# Patient Record
Sex: FEMALE | Race: WHITE | NOT HISPANIC OR LATINO | Employment: OTHER | URBAN - METROPOLITAN AREA
[De-identification: names, ages, dates, MRNs, and addresses within clinical notes are randomized per-mention and may not be internally consistent; named-entity substitution may affect disease eponyms.]

---

## 2018-05-24 LAB
ABSOL LYMPHOCYTES (HISTORICAL): 3.6 K/UL (ref 0.5–4)
ANION GAP SERPL CALCULATED.3IONS-SCNC: 13 MMOL/L (ref 5–14)
BASOPHILS # BLD AUTO: 0.1 K/UL (ref 0–0.1)
BASOPHILS # BLD AUTO: 1 % (ref 0–1)
BUN SERPL-MCNC: 29 MG/DL (ref 5–25)
CALCIUM SERPL-MCNC: 9.3 MG/DL (ref 8.4–10.2)
CHLORIDE SERPL-SCNC: 105 MEQ/L (ref 97–108)
CO2 SERPL-SCNC: 22 MMOL/L (ref 22–30)
CREATINE, SERUM (HISTORICAL): 1.25 MG/DL (ref 0.6–1.2)
DEPRECATED RDW RBC AUTO: 12.4 %
EGFR (HISTORICAL): 41 ML/MIN/1.73 M2
EOSINOPHIL # BLD AUTO: 0 K/UL (ref 0–0.4)
EOSINOPHIL NFR BLD AUTO: 0 % (ref 0–6)
EST. AVERAGE GLUCOSE BLD GHB EST-MCNC: 197 MG/DL
GLUCOSE SERPL-MCNC: 205 MG/DL (ref 70–99)
HBA1C MFR BLD HPLC: 8.5 %
HCT VFR BLD AUTO: 35.9 % (ref 36–46)
HGB BLD-MCNC: 11.8 G/DL (ref 12–16)
LYMPHOCYTES NFR BLD AUTO: 36 % (ref 25–45)
MCH RBC QN AUTO: 31.5 PG (ref 26–34)
MCHC RBC AUTO-ENTMCNC: 32.7 % (ref 31–36)
MCV RBC AUTO: 96 FL (ref 80–100)
MONOCYTES # BLD AUTO: 0.6 K/UL (ref 0.2–0.9)
MONOCYTES NFR BLD AUTO: 6 % (ref 1–10)
NEUTROPHILS ABS COUNT (HISTORICAL): 5.7 K/UL (ref 1.8–7.8)
NEUTS SEG NFR BLD AUTO: 57 % (ref 45–65)
PLATELET # BLD AUTO: 209 K/MCL (ref 150–450)
POTASSIUM SERPL-SCNC: 5 MEQ/L (ref 3.6–5)
RBC # BLD AUTO: 3.73 M/MCL (ref 4–5.2)
SODIUM SERPL-SCNC: 140 MEQ/L (ref 137–147)
TSH SERPL DL<=0.05 MIU/L-ACNC: 1.28 UIU/ML (ref 0.47–4.68)
WBC # BLD AUTO: 10 K/MCL (ref 4.5–11)

## 2018-06-26 DIAGNOSIS — E11.9 TYPE 2 DIABETES MELLITUS WITHOUT COMPLICATION, UNSPECIFIED WHETHER LONG TERM INSULIN USE (HCC): Primary | ICD-10-CM

## 2018-06-26 RX ORDER — GLYBURIDE 3 MG/1
TABLET ORAL
Qty: 360 TABLET | Refills: 3 | Status: SHIPPED | OUTPATIENT
Start: 2018-06-26 | End: 2019-04-29 | Stop reason: SDUPTHER

## 2018-09-07 DIAGNOSIS — E03.9 HYPOTHYROIDISM, UNSPECIFIED TYPE: Primary | ICD-10-CM

## 2018-09-08 RX ORDER — LEVOTHYROXINE SODIUM 0.07 MG/1
TABLET ORAL
Qty: 90 TABLET | Refills: 3 | Status: SHIPPED | OUTPATIENT
Start: 2018-09-08 | End: 2019-09-02 | Stop reason: SDUPTHER

## 2018-09-14 ENCOUNTER — TELEPHONE (OUTPATIENT)
Dept: FAMILY MEDICINE CLINIC | Facility: CLINIC | Age: 83
End: 2018-09-14

## 2018-09-14 NOTE — TELEPHONE ENCOUNTER
Called patient informed her that her appointment on 11/26/18 at 10 am with dr Joaquin Roblero is cancelled he is out of the office and appointment was rescheduled to 11/15/18 at 11 am with dr Joaquin Roblero patient aware of new appointment date and time

## 2018-10-08 RX ORDER — NIFEDIPINE 60 MG/1
TABLET, FILM COATED, EXTENDED RELEASE ORAL
COMMUNITY
Start: 2018-07-12 | End: 2018-10-09 | Stop reason: SDUPTHER

## 2018-10-08 RX ORDER — NIFEDIPINE 60 MG/1
TABLET, FILM COATED, EXTENDED RELEASE ORAL
Refills: 0 | Status: CANCELLED | OUTPATIENT
Start: 2018-10-08

## 2018-10-08 NOTE — TELEPHONE ENCOUNTER
Called pt left detailed message on pt machine to call office to schedule appointment before medication refill can be made thank you

## 2018-10-08 NOTE — TELEPHONE ENCOUNTER
Patient called she needs presp for Nisedipine 60 mg called into [facility] [address] [facility] at [phone] she would like  60 tablets  Pt of dr Deisi Crane and she said that she needs it filled as soon as possible only has 4 pills left     TY

## 2018-10-09 DIAGNOSIS — I10 ESSENTIAL HYPERTENSION: Primary | ICD-10-CM

## 2018-10-09 RX ORDER — NIFEDIPINE 60 MG/1
60 TABLET, FILM COATED, EXTENDED RELEASE ORAL DAILY
Qty: 30 TABLET | Refills: 0 | Status: SHIPPED | OUTPATIENT
Start: 2018-10-09 | End: 2018-10-29 | Stop reason: SDUPTHER

## 2018-10-09 NOTE — TELEPHONE ENCOUNTER
Left message on answering machine to return a call to Dr. Ledesma's office at [facility],biopsy results, ADELINE.   Patient called she said that she needs presp for nisedipine 60 mg called into the pharmacy and she does have an appointment scheduled on 11/15/18 with dr Deshaun Beard

## 2018-10-29 DIAGNOSIS — I10 ESSENTIAL HYPERTENSION: ICD-10-CM

## 2018-10-29 RX ORDER — NIFEDIPINE 60 MG/1
60 TABLET, FILM COATED, EXTENDED RELEASE ORAL DAILY
Qty: 90 TABLET | Refills: 3 | Status: SHIPPED | OUTPATIENT
Start: 2018-10-29 | End: 2019-08-12 | Stop reason: SDUPTHER

## 2018-10-29 NOTE — TELEPHONE ENCOUNTER
Can you please refill patient medication her apt is for nov 15 but she will be out of medication before this date thanks     Fax number- [phone]  Fax number- [phone]

## 2018-11-14 RX ORDER — LISINOPRIL 40 MG/1
TABLET ORAL
COMMUNITY
Start: 2018-04-26 | End: 2019-03-04 | Stop reason: SDUPTHER

## 2018-11-14 RX ORDER — DIAZEPAM 2 MG/1
TABLET ORAL
COMMUNITY
Start: 2016-12-13 | End: 2018-11-15 | Stop reason: SDUPTHER

## 2018-11-14 RX ORDER — BLOOD-GLUCOSE METER
EACH MISCELLANEOUS
COMMUNITY
Start: 2018-01-15

## 2018-11-14 RX ORDER — ASPIRIN 81 MG/1
TABLET ORAL
COMMUNITY
Start: 2017-12-07 | End: 2018-11-15 | Stop reason: ALTCHOICE

## 2018-11-14 RX ORDER — LOVASTATIN 40 MG/1
TABLET ORAL EVERY 24 HOURS
COMMUNITY
Start: 2017-12-07 | End: 2018-12-03 | Stop reason: SDUPTHER

## 2018-11-14 RX ORDER — LANCETS 23 GAUGE
EACH MISCELLANEOUS
COMMUNITY
Start: 2018-01-15 | End: 2019-05-16 | Stop reason: SDUPTHER

## 2018-11-15 ENCOUNTER — OFFICE VISIT (OUTPATIENT)
Dept: FAMILY MEDICINE CLINIC | Facility: CLINIC | Age: 83
End: 2018-11-15
Payer: MEDICARE

## 2018-11-15 VITALS
HEIGHT: 59 IN | OXYGEN SATURATION: 73 % | DIASTOLIC BLOOD PRESSURE: 70 MMHG | BODY MASS INDEX: 28.32 KG/M2 | SYSTOLIC BLOOD PRESSURE: 160 MMHG | HEART RATE: 90 BPM | WEIGHT: 140.5 LBS | TEMPERATURE: 96.1 F

## 2018-11-15 DIAGNOSIS — E11.9 TYPE 2 DIABETES MELLITUS WITHOUT COMPLICATION, WITHOUT LONG-TERM CURRENT USE OF INSULIN (HCC): ICD-10-CM

## 2018-11-15 DIAGNOSIS — E03.9 HYPOTHYROIDISM, UNSPECIFIED TYPE: ICD-10-CM

## 2018-11-15 DIAGNOSIS — F41.9 ANXIETY: Primary | ICD-10-CM

## 2018-11-15 DIAGNOSIS — I10 ESSENTIAL HYPERTENSION: ICD-10-CM

## 2018-11-15 DIAGNOSIS — R80.9 MICROALBUMINURIA: ICD-10-CM

## 2018-11-15 LAB
EST. AVERAGE GLUCOSE BLD GHB EST-MCNC: 226 MG/DL
HBA1C MFR BLD: 9.5 % (ref 4.2–6.3)

## 2018-11-15 PROCEDURE — 36415 COLL VENOUS BLD VENIPUNCTURE: CPT | Performed by: FAMILY MEDICINE

## 2018-11-15 PROCEDURE — 99214 OFFICE O/P EST MOD 30 MIN: CPT | Performed by: FAMILY MEDICINE

## 2018-11-15 PROCEDURE — 83036 HEMOGLOBIN GLYCOSYLATED A1C: CPT | Performed by: FAMILY MEDICINE

## 2018-11-15 RX ORDER — DIAZEPAM 2 MG/1
TABLET ORAL
Qty: 30 TABLET | Refills: 0 | Status: SHIPPED | OUTPATIENT
Start: 2018-11-15 | End: 2019-03-27 | Stop reason: SDUPTHER

## 2018-11-15 NOTE — PROGRESS NOTES
Assessment/Plan:    Microalbuminuria  Last urine stable     Hypertension  Home # Ok        Diagnoses and all orders for this visit:    Anxiety  -     diazepam (VALIUM) 2 mg tablet; Take one tablet every 6 hours as needed    Type 2 diabetes mellitus without complication, without long-term current use of insulin (HCC)  -     Hemoglobin A1C    Hypothyroidism, unspecified type    Essential hypertension    Microalbuminuria    Other orders  -     glucose blood test strip; tests once daily by fingerstick route  -     Blood Glucose Monitoring Suppl (ONETOUCH VERIO FLEX SYSTEM) w/Device KIT; take by Subcutaneous route once  -     Lancets 28G MISC; once daily by fingerstick route  -     lovastatin (MEVACOR) 40 MG tablet; every 24 hours  -     lisinopril (ZESTRIL) 40 mg tablet; Take 1 tablet by mouth daily  -     Discontinue: diazepam (VALIUM) 2 mg tablet; As needed 1 Q 6 H FOR ANXIETY  -     Discontinue: aspirin (ASPIR-81) 81 mg EC tablet; Take 1 tablet by mouth daily          Subjective:      Patient ID: Ck Hoffman is a 80 y o  female  PATIENT RETURNS FOR FOLLOW-UP OF CHRONIC MEDICAL CONDITIONS  NO HOSPITAL STAYS OR EMERGENCY VISITS RECENTLY  MEDS WERE REVIEWED AND NO SIDE EFFECTS  NO NEW ISSUES  UNLESS NOTED BELOW  CHRONIC MEDICAL ISSUES WERE STABLE TODAY  NO MED CHANGES WERE MADE  SURVEILLANCE LABS IF INDICATED WERE DONE  The following portions of the patient's history were reviewed and updated as appropriate: allergies, current medications, past family history, past medical history, past social history, past surgical history and problem list     Review of Systems   Constitutional: Negative for activity change and appetite change  HENT: Negative for voice change  Eyes: Negative for visual disturbance  Respiratory: Negative for chest tightness and shortness of breath  Cardiovascular: Negative for chest pain, palpitations and leg swelling     Gastrointestinal: Negative for abdominal pain, blood in stool, constipation and diarrhea  Genitourinary: Negative for dysuria, vaginal bleeding and vaginal discharge  Skin: Negative for rash  Neurological: Negative for dizziness  Psychiatric/Behavioral: Negative for dysphoric mood  Objective:  Vitals:    11/15/18 0958   BP: 160/70   BP Location: Left arm   Patient Position: Sitting   Cuff Size: Adult   Pulse: 90   Temp: (!) 96 1 °F (35 6 °C)   TempSrc: Temporal   SpO2: (!) 73%   Weight: 63 7 kg (140 lb 8 oz)   Height: 4' 11 45" (1 51 m)      Physical Exam   Constitutional: She appears well-developed and well-nourished  HENT:   Head: Normocephalic and atraumatic  Eyes: Conjunctivae are normal    Neck: Neck supple  No thyromegaly present  Cardiovascular: Normal rate, regular rhythm, normal heart sounds and intact distal pulses  No murmur heard  Pulmonary/Chest: Effort normal and breath sounds normal  No respiratory distress  Musculoskeletal: She exhibits no edema  Lymphadenopathy:     She has no cervical adenopathy  Skin: Skin is warm and dry  Psychiatric: She has a normal mood and affect   Her behavior is normal

## 2018-11-15 NOTE — PATIENT INSTRUCTIONS
Try 2 Tylenol Arthritis at bedtime to help the morning stiffness     CURRENT [employer] TIPS ON EXERCISE:    IF YOU HAVE CONDITIONS THAT PREVENT AEROBIC EXERCISE:    WALK 30 MINUTES AT LEAST 5 DAYS PER WEEK; MAY BREAK IT UP INTO 10-  15 MINUTE SESSIONS   GET SOME RESISTANCE EXERCISES USING THE MAJOR MUSCLE GROUPS 2-3 TIMES PER WEEK     IF YOU ARE PHYSICALLY ABLE:    TRY TO GET 10,000 STEPS 3 TIMES PER WEEK  PLUS  GO "ONLINE" TO CHECK [employer] HEART RATE FOR YOUR AGE AND DO AEROBIC EXERCISES (JOG/STATIONARY BIKE/ELLIPTICAL) FOR 20-30 MINUTES 2-3 TIMES PER WEEK  WE RECOMMEND GETTING THE 2- DOSE SHINGLES VACCINE ([address] [place] [address] [address]) FROM YOUR PHARMACY  CHECK WITH THEM ON THE COST  YOU SHOULD HAVE THIS IF OVER AGE 48, EVEN IF YOU HAVE HAD THE ORIGINAL VACCINE (ZOSTRIX)

## 2018-11-21 ENCOUNTER — OFFICE VISIT (OUTPATIENT)
Dept: FAMILY MEDICINE CLINIC | Facility: CLINIC | Age: 83
End: 2018-11-21
Payer: MEDICARE

## 2018-11-21 VITALS
SYSTOLIC BLOOD PRESSURE: 132 MMHG | BODY MASS INDEX: 27.86 KG/M2 | HEIGHT: 59 IN | DIASTOLIC BLOOD PRESSURE: 64 MMHG | TEMPERATURE: 98.1 F | HEART RATE: 84 BPM | WEIGHT: 138.2 LBS

## 2018-11-21 DIAGNOSIS — J32.9 SINUSITIS, UNSPECIFIED CHRONICITY, UNSPECIFIED LOCATION: Primary | ICD-10-CM

## 2018-11-21 PROCEDURE — 99213 OFFICE O/P EST LOW 20 MIN: CPT | Performed by: FAMILY MEDICINE

## 2018-11-21 RX ORDER — AMOXICILLIN 500 MG/1
500 CAPSULE ORAL EVERY 8 HOURS SCHEDULED
Qty: 21 CAPSULE | Refills: 0 | Status: SHIPPED | OUTPATIENT
Start: 2018-11-21 | End: 2018-11-28

## 2018-11-21 RX ORDER — LANCETS
EACH MISCELLANEOUS
COMMUNITY
Start: 2018-01-15 | End: 2022-07-28 | Stop reason: SDUPTHER

## 2018-11-21 NOTE — PROGRESS NOTES
Assessment/Plan:    No problem-specific Assessment & Plan notes found for this encounter  Diagnoses and all orders for this visit:    Sinusitis, unspecified chronicity, unspecified location  -     amoxicillin (AMOXIL) 500 mg capsule; Take 1 capsule (500 mg total) by mouth every 8 (eight) hours for 7 days    Other orders  -     Lancets (ONETOUCH ULTRASOFT) lancets; once daily by fingerstick route  -     glucose blood test strip; tests once daily by fingerstick route          Subjective:      Patient ID: Tracey Willis is a 80 y o  female  Patient comes today with complaints of sinus infection  Facial pressure discolored nasal and chest mucus some cough headache congestion in the nose  No chest pain and no shortness of breath  No fevers  The following portions of the patient's history were reviewed and updated as appropriate: allergies, current medications, past family history, past medical history, past social history, past surgical history and problem list     Review of Systems      Objective:  Vitals:    11/21/18 1014   BP: 132/64   BP Location: Left arm   Patient Position: Sitting   Cuff Size: Adult   Pulse: 84   Temp: 98 1 °F (36 7 °C)   Weight: 62 7 kg (138 lb 3 2 oz)   Height: 4' 11 45" (1 51 m)      Physical Exam   Constitutional: She appears well-developed and well-nourished  Eyes: Conjunctivae are normal    Neck: No thyromegaly present  Cardiovascular: Normal rate and regular rhythm  Pulmonary/Chest: Effort normal and breath sounds normal    Skin: Skin is warm and dry  Psychiatric: Thought content normal          Clinical course is consistent with sinusitis non improving  Antibiotics and topical decongestants

## 2018-11-21 NOTE — PATIENT INSTRUCTIONS
Take the amoxicillin for 1 full week  Use Afrin nasal spray for 3-4 days to open the sinuses and get rid of the pressure

## 2018-12-03 DIAGNOSIS — E78.5 HYPERLIPIDEMIA, UNSPECIFIED HYPERLIPIDEMIA TYPE: Primary | ICD-10-CM

## 2018-12-03 RX ORDER — LOVASTATIN 40 MG/1
40 TABLET ORAL DAILY
Qty: 90 TABLET | Refills: 3 | Status: SHIPPED | OUTPATIENT
Start: 2018-12-03 | End: 2019-10-22 | Stop reason: SDUPTHER

## 2018-12-03 NOTE — TELEPHONE ENCOUNTER
Patient called she needs Rx for Lovastatin 40 mg wants a 90 day supply with 3 refills sent into [facility] mail order     TY

## 2019-03-04 DIAGNOSIS — I10 HYPERTENSION, UNSPECIFIED TYPE: Primary | ICD-10-CM

## 2019-03-04 RX ORDER — LISINOPRIL 40 MG/1
TABLET ORAL
Qty: 90 TABLET | Refills: 3 | Status: SHIPPED | OUTPATIENT
Start: 2019-03-04 | End: 2020-02-19

## 2019-03-18 ENCOUNTER — OFFICE VISIT (OUTPATIENT)
Dept: URGENT CARE | Facility: MEDICAL CENTER | Age: 84
End: 2019-03-18
Payer: MEDICARE

## 2019-03-18 VITALS
OXYGEN SATURATION: 99 % | HEIGHT: 59 IN | HEART RATE: 119 BPM | TEMPERATURE: 97 F | DIASTOLIC BLOOD PRESSURE: 80 MMHG | WEIGHT: 134 LBS | SYSTOLIC BLOOD PRESSURE: 180 MMHG | BODY MASS INDEX: 27.01 KG/M2 | RESPIRATION RATE: 18 BRPM

## 2019-03-18 DIAGNOSIS — B02.9 HERPES ZOSTER WITHOUT COMPLICATION: Primary | ICD-10-CM

## 2019-03-18 PROCEDURE — G0463 HOSPITAL OUTPT CLINIC VISIT: HCPCS | Performed by: PHYSICIAN ASSISTANT

## 2019-03-18 PROCEDURE — 99213 OFFICE O/P EST LOW 20 MIN: CPT | Performed by: PHYSICIAN ASSISTANT

## 2019-03-18 RX ORDER — FAMCICLOVIR 500 MG/1
500 TABLET, FILM COATED ORAL 3 TIMES DAILY
Qty: 21 TABLET | Refills: 0 | Status: SHIPPED | OUTPATIENT
Start: 2019-03-18 | End: 2019-05-16

## 2019-03-18 NOTE — PATIENT INSTRUCTIONS
Take Famciclovir as prescribed  Keep affected area covered to avoid transmission   Take Tylenol and use lidocaine patches over the counter  Cool compresses over area  Talk to your PCP about varicella zoster vaccine  Follow up with PCP in 3-5 days  Proceed to  ER if symptoms worsen  Shingles   WHAT YOU NEED TO KNOW:   Shingles is a painful infection caused by the same virus that causes chickenpox (varicella-zoster virus)  After you get chickenpox, the virus stays in your body for several years without causing any symptoms  Shingles occurs when the virus becomes active again  Once active, the virus will travel along a nerve to your skin and cause a rash  DISCHARGE INSTRUCTIONS:   Return to the emergency department if:   · You have painful, red, warm skin around the blisters, or the blisters drain pus  · Your neck is stiff or you have trouble moving it  · You have trouble moving your arms, legs, or face  · You have a seizure  · You have weakness in an arm or leg  · You become confused, or have difficulty speaking  · You have dizziness, a severe headache, hearing or vision loss  Contact your healthcare provider if:   · You feel weak or have a headache  · You have a cough, chills, or a fever  · You have abdominal pain, nausea, or vomiting  · Your rash becomes more itchy or painful  · Your rash spreads to other parts of your body  · Your pain worsens and does not go away even after taking medicine  · You have questions or concerns about your condition or care  Medicines:   · Antiviral medicine  helps decrease symptoms and healing time  They may also decrease your risk of developing nerve pain  You will need to start taking them within 3 days of the start of symptoms to prevent nerve pain  · Pain medicine  may be prescribed or suggested by your healthcare provider  You may need NSAIDs, acetaminophen, or opioid medicine depending on how much pain you are in   Do not wait until the pain is severe before you take more pain medicine  · Topical anesthetics  are used to numb the skin and decrease pain  They can be a cream, gel, spray, or patch  · Anticonvulsants  decrease nerve pain and may help you sleep at night  · Antidepressants  may be used to decrease nerve pain  · Take your medicine as directed  Contact your healthcare provider if you think your medicine is not helping or if you have side effects  Tell him of her if you are allergic to any medicine  Keep a list of the medicines, vitamins, and herbs you take  Include the amounts, and when and why you take them  Bring the list or the pill bottles to follow-up visits  Carry your medicine list with you in case of an emergency  Follow up with your healthcare provider as directed:  Write down your questions so you remember to ask them during your visits  Self-care:  Keep your rash clean and dry  Cover your rash with a bandage or clothing  Do not use bandages that stick to your skin  The sticky part may irritate your skin and make your rash last longer  Prevent the spread of shingles: The virus can be passed to a person who has never had chickenpox  This person may get chickenpox, but not shingles  You may pass the virus to others as long as you have a rash  The virus is spread by direct contact with the fluid from the blisters  Usually, you cannot spread the virus once the blisters dry up  Prevent shingles or another shingles outbreak:  A vaccine may be given to help prevent shingles  Ask for more information about this vaccine  © 2017 [employer] Information is for End User's use only and may not be sold, redistributed or otherwise used for commercial purposes  All illustrations and images included in CareNotes® are the copyrighted property of [employer]  or [employer]  The above information is an  only   It is not intended as medical advice for individual conditions or treatments  Talk to your doctor, nurse or pharmacist before following any medical regimen to see if it is safe and effective for you

## 2019-03-18 NOTE — PROGRESS NOTES
[facility]        NAME: Robbin Coronado is a 80 y o  female  : 1933    MRN: [id number]  DATE: 2019  TIME: 10:53 AM    Assessment and Plan   Herpes zoster without complication [P51 5]  1  Herpes zoster without complication  famciclovir (FAMVIR) 500 mg tablet     Instructed patient to go to ED if symptoms worsened  Patient Instructions     Take Famciclovir as prescribed  Keep affected area covered to avoid transmission   Take Tylenol and use lidocaine patches over the counter  Cool compresses over area  Talk to your PCP about varicella zoster vaccine  Follow up with PCP in 3-5 days  Proceed to  ER if symptoms worsen  Chief Complaint     Chief Complaint   Patient presents with    Abdominal Pain     right side pain since yesterday  has had before for a couple of years  has seen family doctor and was told could be interior shingles  pain describes as an inflammation pain that is worse with palpation         History of Present Illness       Admits to prior similar symptoms "a couple of years ago" and was told that it could be "interior shingles"  States those symptoms were "on and off" but this is the worst  Denies trauma or falls recently  PMH of DM  Denies kidney or liver dysfunction  Denies urinary symptoms or rash  Patient had the old shingles vaccine  Abdominal Pain   This is a new problem  The current episode started yesterday  The onset quality is sudden  The problem occurs constantly  The problem has been unchanged  Pain location: RUQ (flank) The pain is severe  The quality of the pain is burning ("inflammation")  The abdominal pain does not radiate  Associated symptoms include nausea (that has since resolved)  Pertinent negatives include no diarrhea, dysuria, fever, frequency, headaches, hematuria, myalgias or vomiting  The pain is aggravated by palpation  The pain is relieved by nothing (states episodes used to be better with sittling vs standing but hasn't this time)  She has tried nothing for the symptoms  Prior workup: Patient never had a colonoscopy  There is no history of abdominal surgery, Crohn's disease, gallstones, GERD, irritable bowel syndrome, pancreatitis, PUD or ulcerative colitis  pyelonephritis       Review of Systems   Review of Systems   Constitutional: Negative for activity change, appetite change, chills and fever  Respiratory: Negative for cough and shortness of breath  Cardiovascular: Negative for chest pain and palpitations  Gastrointestinal: Positive for abdominal pain and nausea (that has since resolved)  Negative for abdominal distention, anal bleeding, blood in stool, diarrhea and vomiting  Genitourinary: Negative for dysuria, flank pain, frequency, hematuria and urgency  Musculoskeletal: Negative for myalgias  Skin: Negative for rash  Neurological: Negative for dizziness, light-headedness and headaches           Current Medications       Current Outpatient Medications:     Blood Glucose Monitoring Suppl ([address]) w/Device KIT, take by Subcutaneous route once, Disp: , Rfl:     diazepam (VALIUM) 2 mg tablet, Take one tablet every 6 hours as needed, Disp: 30 tablet, Rfl: 0    famciclovir (FAMVIR) 500 mg tablet, Take 1 tablet (500 mg total) by mouth 3 (three) times a day for 7 days, Disp: 21 tablet, Rfl: 0    glucose blood test strip, tests once daily by fingerstick route, Disp: , Rfl:     glucose blood test strip, tests once daily by fingerstick route, Disp: , Rfl:     glyBURIDE micronized (GLYNASE) 3 mg tablet, TAKE 1 TABLET FOUR TIMES DAILY, Disp: 360 tablet, Rfl: 3    Lancets (ONETOUCH ULTRASOFT) lancets, once daily by fingerstick route, Disp: , Rfl:     Lancets 28G MISC, once daily by fingerstick route, Disp: , Rfl:     levothyroxine 75 mcg tablet, TAKE 1 TABLET EVERY DAY, Disp: 90 tablet, Rfl: 3    lisinopril (ZESTRIL) 40 mg tablet, TAKE 1 TABLET EVERY DAY, Disp: 90 tablet, Rfl: 3    lovastatin (MEVACOR) 40 MG tablet, Take 1 tablet (40 mg total) by mouth daily, Disp: 90 tablet, Rfl: 3    metFORMIN (GLUCOPHAGE) 1000 MG tablet, TAKE 1 TABLET EVERY DAY, Disp: 90 tablet, Rfl: 3    NIFEdipine ER (ADALAT CC) 60 MG 24 hr tablet, Take 1 tablet (60 mg total) by mouth daily, Disp: 90 tablet, Rfl: 3    Current Allergies     Allergies as of 03/18/2019 - Reviewed 03/18/2019   Allergen Reaction Noted    Codeine  01/01/1900    Hydrochlorothiazide  07/24/2008    Hydrocodone GI Intolerance 07/24/2008    Ibuprofen  03/23/2010    Oxycodone  03/23/2010            The following portions of the patient's history were reviewed and updated as appropriate: allergies, current medications, past family history, past medical history, past social history, past surgical history and problem list      Past Medical History:   Diagnosis Date    Abnormal mammogram 2008    Anxiety 5/15/2014    Carcinoid tumor     Diabetes mellitus ([address] )     Disease of thyroid gland     High cholesterol     Hypertension     Osteoarthritis 7/24/2008    Sciatica 2008       Past Surgical History:   Procedure Laterality Date    APPENDECTOMY      incidental finding at appendectomy    LUMBAR DISC SURGERY      TOTAL ABDOMINAL HYSTERECTOMY W/ BILATERAL SALPINGOOPHORECTOMY      benign    TUMOR REMOVAL      carcinoid tumor surgery       Family History   Problem Relation Age of Onset    Liver cancer Mother     Hyperlipidemia Father     Heart attack Father     Heart disease Maternal Grandfather          Medications have been verified  Objective   BP (!) 180/80   Pulse (!) 119   Temp (!) 97 °F (36 1 °C) (Tympanic)   Resp 18   Ht 4' 11 45" (1 51 m)   Wt 60 8 kg (134 lb)   SpO2 99%   BMI 26 66 kg/m²        Physical Exam     Physical Exam   Constitutional: She appears well-developed and well-nourished  No distress  Cardiovascular: Normal rate and regular rhythm  Exam reveals no gallop and no friction rub  No murmur heard    Pulmonary/Chest: Effort normal and breath sounds normal  No respiratory distress  She has no wheezes  She has no rales  She exhibits no tenderness  Abdominal: Soft  Bowel sounds are normal  She exhibits no distension and no mass  There is no tenderness  There is no rebound, no guarding, no CVA tenderness, no tenderness at McBurney's point and negative Ochoa's sign  Negative CVA tenderness  Patient is only tender over the R upper lateral aspect of the abdomen along a nerve distribution  No rash noted  Lymphadenopathy:     She has no cervical adenopathy  Neurological: She is alert  Skin: Skin is warm  She is not diaphoretic  Psychiatric: She has a normal mood and affect   Her behavior is normal  Judgment and thought content normal

## 2019-03-27 ENCOUNTER — OFFICE VISIT (OUTPATIENT)
Dept: FAMILY MEDICINE CLINIC | Facility: CLINIC | Age: 84
End: 2019-03-27
Payer: MEDICARE

## 2019-03-27 ENCOUNTER — TREATMENT (OUTPATIENT)
Dept: FAMILY MEDICINE CLINIC | Facility: CLINIC | Age: 84
End: 2019-03-27

## 2019-03-27 VITALS
DIASTOLIC BLOOD PRESSURE: 90 MMHG | SYSTOLIC BLOOD PRESSURE: 160 MMHG | HEART RATE: 102 BPM | WEIGHT: 135.2 LBS | HEIGHT: 59 IN | TEMPERATURE: 98.3 F | OXYGEN SATURATION: 97 % | BODY MASS INDEX: 27.26 KG/M2

## 2019-03-27 DIAGNOSIS — F41.9 ANXIETY: ICD-10-CM

## 2019-03-27 DIAGNOSIS — R07.9 CHEST PAIN, UNSPECIFIED TYPE: Primary | ICD-10-CM

## 2019-03-27 PROCEDURE — 99213 OFFICE O/P EST LOW 20 MIN: CPT | Performed by: FAMILY MEDICINE

## 2019-03-27 RX ORDER — MELOXICAM 7.5 MG/1
15 TABLET ORAL DAILY
Qty: 7 TABLET | Refills: 1 | Status: SHIPPED | OUTPATIENT
Start: 2019-03-27 | End: 2019-05-16

## 2019-03-27 RX ORDER — DIAZEPAM 2 MG/1
TABLET ORAL
Qty: 30 TABLET | Refills: 0 | Status: SHIPPED | OUTPATIENT
Start: 2019-03-27 | End: 2019-04-01 | Stop reason: SDUPTHER

## 2019-03-27 RX ORDER — DIAZEPAM 2 MG/1
TABLET ORAL
Qty: 30 TABLET | Refills: 0 | Status: SHIPPED | OUTPATIENT
Start: 2019-03-27 | End: 2019-03-27 | Stop reason: SDUPTHER

## 2019-03-27 RX ORDER — DIAZEPAM 2 MG/1
TABLET ORAL
Qty: 30 TABLET | Refills: 0 | Status: CANCELLED | OUTPATIENT
Start: 2019-03-27

## 2019-03-27 NOTE — PROGRESS NOTES
Assessment/Plan:    No problem-specific Assessment & Plan notes found for this encounter  There are no diagnoses linked to this encounter  Subjective:      Patient ID: Binta Martinez is a 80 y o  female  R sided torso pain; patient has had right-sided pain for about 12 days no history of fall or injury she was seen at urgent care thought this might be shingles without rash was given antiviral medicine but she persists with pain  Patient notices some aggravation of the pain with motion and also there is a tender spot if she presses no rash has occurred  She has no cough or trouble with breathing  The following portions of the patient's history were reviewed and updated as appropriate: allergies, current medications, past family history, past medical history, past social history, past surgical history and problem list     Review of Systems   Constitutional: Negative for fever  Musculoskeletal: Positive for back pain and myalgias  Objective:  Vitals:    03/27/19 0745   BP: 160/90   BP Location: Left arm   Patient Position: Sitting   Cuff Size: Adult   Pulse: 102   Temp: 98 3 °F (36 8 °C)   TempSrc: Temporal   SpO2: 97%   Weight: 61 3 kg (135 lb 3 2 oz)   Height: 4' 11 45" (1 51 m)      Physical Exam   Musculoskeletal: She exhibits tenderness  No   CVA tenderness / mass / rash          This picture is consistent with musculoskeletal origin    Quite sensitive trying to get up and down on the table today and also sensitive to press

## 2019-03-27 NOTE — PATIENT INSTRUCTIONS
Take the anti-inflammatory pain reduce her once daily for 1 week  You may also add Tylenol to that if you wish  Try to avoid excessive stretching of the area    If her not doing at least 50-60% better in 1 week call me please

## 2019-04-01 DIAGNOSIS — F41.9 ANXIETY: ICD-10-CM

## 2019-04-01 RX ORDER — DIAZEPAM 2 MG/1
TABLET ORAL
Qty: 30 TABLET | Refills: 0 | Status: SHIPPED | OUTPATIENT
Start: 2019-04-01 | End: 2019-11-21 | Stop reason: SDUPTHER

## 2019-04-29 DIAGNOSIS — E11.9 TYPE 2 DIABETES MELLITUS WITHOUT COMPLICATION, UNSPECIFIED WHETHER LONG TERM INSULIN USE (HCC): ICD-10-CM

## 2019-04-30 RX ORDER — GLYBURIDE 3 MG/1
TABLET ORAL
Qty: 360 TABLET | Refills: 3 | Status: SHIPPED | OUTPATIENT
Start: 2019-04-30 | End: 2019-11-21

## 2019-05-16 ENCOUNTER — OFFICE VISIT (OUTPATIENT)
Dept: FAMILY MEDICINE CLINIC | Facility: CLINIC | Age: 84
End: 2019-05-16
Payer: MEDICARE

## 2019-05-16 VITALS
DIASTOLIC BLOOD PRESSURE: 70 MMHG | TEMPERATURE: 98.6 F | WEIGHT: 137.3 LBS | HEIGHT: 59 IN | BODY MASS INDEX: 27.68 KG/M2 | HEART RATE: 86 BPM | SYSTOLIC BLOOD PRESSURE: 140 MMHG

## 2019-05-16 DIAGNOSIS — R80.9 MICROALBUMINURIA: ICD-10-CM

## 2019-05-16 DIAGNOSIS — F41.9 ANXIETY: ICD-10-CM

## 2019-05-16 DIAGNOSIS — I10 ESSENTIAL HYPERTENSION: ICD-10-CM

## 2019-05-16 DIAGNOSIS — M19.90 OSTEOARTHRITIS, UNSPECIFIED OSTEOARTHRITIS TYPE, UNSPECIFIED SITE: ICD-10-CM

## 2019-05-16 DIAGNOSIS — E03.9 HYPOTHYROIDISM, UNSPECIFIED TYPE: ICD-10-CM

## 2019-05-16 DIAGNOSIS — E11.9 TYPE 2 DIABETES MELLITUS WITHOUT COMPLICATION, WITHOUT LONG-TERM CURRENT USE OF INSULIN (HCC): Primary | ICD-10-CM

## 2019-05-16 DIAGNOSIS — E78.49 OTHER HYPERLIPIDEMIA: ICD-10-CM

## 2019-05-16 LAB
ALBUMIN SERPL BCP-MCNC: 4.2 G/DL (ref 3.5–5)
ALP SERPL-CCNC: 70 U/L (ref 46–116)
ALT SERPL W P-5'-P-CCNC: 14 U/L (ref 12–78)
ANION GAP SERPL CALCULATED.3IONS-SCNC: 9 MMOL/L (ref 4–13)
AST SERPL W P-5'-P-CCNC: 14 U/L (ref 5–45)
BILIRUB SERPL-MCNC: 0.39 MG/DL (ref 0.2–1)
BUN SERPL-MCNC: 27 MG/DL (ref 5–25)
CALCIUM SERPL-MCNC: 8.7 MG/DL (ref 8.3–10.1)
CHLORIDE SERPL-SCNC: 109 MMOL/L (ref 100–108)
CO2 SERPL-SCNC: 22 MMOL/L (ref 21–32)
CREAT SERPL-MCNC: 1.22 MG/DL (ref 0.6–1.3)
EST. AVERAGE GLUCOSE BLD GHB EST-MCNC: 212 MG/DL
GFR SERPL CREATININE-BSD FRML MDRD: 40 ML/MIN/1.73SQ M
GLUCOSE SERPL-MCNC: 137 MG/DL (ref 65–140)
HBA1C MFR BLD: 9 % (ref 4.2–6.3)
POTASSIUM SERPL-SCNC: 4.4 MMOL/L (ref 3.5–5.3)
PROT SERPL-MCNC: 7.5 G/DL (ref 6.4–8.2)
SODIUM SERPL-SCNC: 140 MMOL/L (ref 136–145)
TSH SERPL DL<=0.05 MIU/L-ACNC: 2.69 UIU/ML (ref 0.36–3.74)

## 2019-05-16 PROCEDURE — 36415 COLL VENOUS BLD VENIPUNCTURE: CPT | Performed by: FAMILY MEDICINE

## 2019-05-16 PROCEDURE — 84443 ASSAY THYROID STIM HORMONE: CPT | Performed by: FAMILY MEDICINE

## 2019-05-16 PROCEDURE — 80053 COMPREHEN METABOLIC PANEL: CPT | Performed by: FAMILY MEDICINE

## 2019-05-16 PROCEDURE — 83036 HEMOGLOBIN GLYCOSYLATED A1C: CPT | Performed by: FAMILY MEDICINE

## 2019-05-16 PROCEDURE — 99214 OFFICE O/P EST MOD 30 MIN: CPT | Performed by: FAMILY MEDICINE

## 2019-06-01 ENCOUNTER — HOSPITAL ENCOUNTER (EMERGENCY)
Facility: HOSPITAL | Age: 84
Discharge: HOME | End: 2019-06-01
Attending: EMERGENCY MEDICINE | Admitting: EMERGENCY MEDICINE
Payer: MEDICARE

## 2019-06-01 ENCOUNTER — APPOINTMENT (EMERGENCY)
Dept: CT IMAGING | Facility: HOSPITAL | Age: 84
End: 2019-06-01
Payer: MEDICARE

## 2019-06-01 VITALS
SYSTOLIC BLOOD PRESSURE: 200 MMHG | OXYGEN SATURATION: 95 % | DIASTOLIC BLOOD PRESSURE: 95 MMHG | RESPIRATION RATE: 14 BRPM | TEMPERATURE: 97.8 F | HEART RATE: 90 BPM

## 2019-06-01 DIAGNOSIS — M54.9 BACK PAIN: Primary | ICD-10-CM

## 2019-06-01 PROCEDURE — 99284 EMERGENCY DEPT VISIT MOD MDM: CPT

## 2019-06-01 PROCEDURE — 96372 THER/PROPH/DIAG INJ SC/IM: CPT

## 2019-06-01 PROCEDURE — 74176 CT ABD & PELVIS W/O CONTRAST: CPT

## 2019-06-01 PROCEDURE — 99284 EMERGENCY DEPT VISIT MOD MDM: CPT | Performed by: EMERGENCY MEDICINE

## 2019-06-01 RX ORDER — GABAPENTIN 300 MG/1
300 CAPSULE ORAL 3 TIMES DAILY
Qty: 42 CAPSULE | Refills: 0 | Status: SHIPPED | OUTPATIENT
Start: 2019-06-01 | End: 2019-11-21 | Stop reason: ALTCHOICE

## 2019-06-01 RX ORDER — LIDOCAINE 50 MG/G
1 PATCH TOPICAL ONCE
Status: DISCONTINUED | OUTPATIENT
Start: 2019-06-01 | End: 2019-06-01 | Stop reason: HOSPADM

## 2019-06-01 RX ORDER — KETOROLAC TROMETHAMINE 30 MG/ML
15 INJECTION, SOLUTION INTRAMUSCULAR; INTRAVENOUS ONCE
Status: COMPLETED | OUTPATIENT
Start: 2019-06-01 | End: 2019-06-01

## 2019-06-01 RX ADMIN — KETOROLAC TROMETHAMINE 15 MG: 30 INJECTION, SOLUTION INTRAMUSCULAR; INTRAVENOUS at 17:57

## 2019-06-01 RX ADMIN — LIDOCAINE 1 PATCH: 50 PATCH TOPICAL at 17:57

## 2019-06-10 ENCOUNTER — NURSE TRIAGE (OUTPATIENT)
Dept: PHYSICAL THERAPY | Facility: OTHER | Age: 84
End: 2019-06-10

## 2019-06-11 ENCOUNTER — OFFICE VISIT (OUTPATIENT)
Dept: FAMILY MEDICINE CLINIC | Facility: CLINIC | Age: 84
End: 2019-06-11
Payer: MEDICARE

## 2019-06-11 VITALS
HEART RATE: 96 BPM | TEMPERATURE: 97.5 F | WEIGHT: 136.8 LBS | SYSTOLIC BLOOD PRESSURE: 134 MMHG | HEIGHT: 59 IN | BODY MASS INDEX: 27.58 KG/M2 | OXYGEN SATURATION: 96 % | DIASTOLIC BLOOD PRESSURE: 80 MMHG

## 2019-06-11 DIAGNOSIS — G62.9 NEUROPATHY: Primary | ICD-10-CM

## 2019-06-11 PROCEDURE — 99213 OFFICE O/P EST LOW 20 MIN: CPT | Performed by: FAMILY MEDICINE

## 2019-06-11 PROCEDURE — 10160 PNXR ASPIR ABSC HMTMA BULLA: CPT | Performed by: FAMILY MEDICINE

## 2019-08-12 DIAGNOSIS — I10 ESSENTIAL HYPERTENSION: ICD-10-CM

## 2019-08-13 RX ORDER — NIFEDIPINE 60 MG/1
TABLET, FILM COATED, EXTENDED RELEASE ORAL
Qty: 90 TABLET | Refills: 3 | Status: SHIPPED | OUTPATIENT
Start: 2019-08-13 | End: 2020-06-03

## 2019-09-02 DIAGNOSIS — E03.9 HYPOTHYROIDISM, UNSPECIFIED TYPE: ICD-10-CM

## 2019-09-02 RX ORDER — LEVOTHYROXINE SODIUM 0.07 MG/1
TABLET ORAL
Qty: 90 TABLET | Refills: 3 | Status: SHIPPED | OUTPATIENT
Start: 2019-09-02 | End: 2020-06-03

## 2019-10-22 DIAGNOSIS — E78.5 HYPERLIPIDEMIA, UNSPECIFIED HYPERLIPIDEMIA TYPE: ICD-10-CM

## 2019-10-22 RX ORDER — LOVASTATIN 40 MG/1
TABLET ORAL
Qty: 90 TABLET | Refills: 3 | Status: SHIPPED | OUTPATIENT
Start: 2019-10-22 | End: 2020-08-03

## 2019-11-21 ENCOUNTER — OFFICE VISIT (OUTPATIENT)
Dept: FAMILY MEDICINE CLINIC | Facility: CLINIC | Age: 84
End: 2019-11-21
Payer: MEDICARE

## 2019-11-21 VITALS
HEART RATE: 97 BPM | WEIGHT: 135.9 LBS | OXYGEN SATURATION: 98 % | DIASTOLIC BLOOD PRESSURE: 70 MMHG | HEIGHT: 59 IN | SYSTOLIC BLOOD PRESSURE: 124 MMHG | TEMPERATURE: 97.5 F | BODY MASS INDEX: 27.4 KG/M2

## 2019-11-21 DIAGNOSIS — E11.9 TYPE 2 DIABETES MELLITUS WITHOUT COMPLICATION, WITHOUT LONG-TERM CURRENT USE OF INSULIN (HCC): Primary | ICD-10-CM

## 2019-11-21 DIAGNOSIS — I10 ESSENTIAL HYPERTENSION: ICD-10-CM

## 2019-11-21 DIAGNOSIS — E11.9 TYPE 2 DIABETES MELLITUS WITHOUT COMPLICATION, UNSPECIFIED WHETHER LONG TERM INSULIN USE (HCC): ICD-10-CM

## 2019-11-21 DIAGNOSIS — E78.49 OTHER HYPERLIPIDEMIA: ICD-10-CM

## 2019-11-21 DIAGNOSIS — F41.9 ANXIETY: ICD-10-CM

## 2019-11-21 DIAGNOSIS — R80.9 MICROALBUMINURIA: ICD-10-CM

## 2019-11-21 DIAGNOSIS — E03.9 HYPOTHYROIDISM, UNSPECIFIED TYPE: ICD-10-CM

## 2019-11-21 LAB
CREAT UR-MCNC: 229 MG/DL
EST. AVERAGE GLUCOSE BLD GHB EST-MCNC: 186 MG/DL
HBA1C MFR BLD: 8.1 % (ref 4.2–6.3)
MICROALBUMIN UR-MCNC: 40.7 MG/L (ref 0–20)
MICROALBUMIN/CREAT 24H UR: 18 MG/G CREATININE (ref 0–30)

## 2019-11-21 PROCEDURE — 36415 COLL VENOUS BLD VENIPUNCTURE: CPT | Performed by: FAMILY MEDICINE

## 2019-11-21 PROCEDURE — 82570 ASSAY OF URINE CREATININE: CPT | Performed by: FAMILY MEDICINE

## 2019-11-21 PROCEDURE — 83036 HEMOGLOBIN GLYCOSYLATED A1C: CPT | Performed by: FAMILY MEDICINE

## 2019-11-21 PROCEDURE — 99214 OFFICE O/P EST MOD 30 MIN: CPT | Performed by: FAMILY MEDICINE

## 2019-11-21 PROCEDURE — G0439 PPPS, SUBSEQ VISIT: HCPCS | Performed by: FAMILY MEDICINE

## 2019-11-21 PROCEDURE — 82043 UR ALBUMIN QUANTITATIVE: CPT | Performed by: FAMILY MEDICINE

## 2019-11-21 RX ORDER — GLYBURIDE 3 MG/1
3 TABLET ORAL 2 TIMES DAILY
Qty: 180 TABLET | Refills: 3
Start: 2019-11-21 | End: 2020-06-15

## 2019-11-21 RX ORDER — DIAZEPAM 2 MG/1
TABLET ORAL
Qty: 30 TABLET | Refills: 4 | Status: SHIPPED | OUTPATIENT
Start: 2019-11-21 | End: 2020-12-21 | Stop reason: SDUPTHER

## 2019-11-21 NOTE — PROGRESS NOTES
Assessment and Plan:     Problem List Items Addressed This Visit        Endocrine    Type 2 diabetes mellitus ([facility] [address] ) - Primary           Preventive health issues were discussed with patient, and age appropriate screening tests were ordered as noted in patient's After Visit Summary  Personalized health advice and appropriate referrals for health education or preventive services given if needed, as noted in patient's After Visit Summary       History of Present Illness:     Patient presents for Medicare Annual Wellness visit    Patient Care Team:  Donavon Zarate MD as PCP - General (Family Medicine)     Problem List:     Patient Active Problem List   Diagnosis    Anxiety    Osteoarthritis    Type 2 diabetes mellitus ([address] )    Hyperlipidemia    Hypertension    Hypothyroid    Microalbuminuria    Neuropathy      Past Medical and Surgical History:     Past Medical History:   Diagnosis Date    Abnormal mammogram 2008    Anxiety 5/15/2014    Carcinoid tumor     Diabetes mellitus ([address] )     Disease of thyroid gland     High cholesterol     Hyperlipidemia     Hypertension     Osteoarthritis 7/24/2008    Sciatica 2008     Past Surgical History:   Procedure Laterality Date    APPENDECTOMY      incidental finding at appendectomy    LUMBAR DISC SURGERY      TOTAL ABDOMINAL HYSTERECTOMY W/ BILATERAL SALPINGOOPHORECTOMY      benign    TUMOR REMOVAL      carcinoid tumor surgery      Family History:     Family History   Problem Relation Age of Onset    Liver cancer Mother     Hyperlipidemia Father     Heart attack Father     Heart disease Maternal Grandfather       Social History:     Social History     Socioeconomic History    Marital status: /Civil Union     Spouse name: None    Number of children: None    Years of education: None    Highest education level: None   Occupational History    Occupation: retired   Social Needs    Financial resource strain: None    Food insecurity:     Worry: None Inability: None    Transportation needs:     Medical: None     Non-medical: None   Tobacco Use    Smoking status: Never Smoker    Smokeless tobacco: Never Used   Substance and Sexual Activity    Alcohol use: No    Drug use: No    Sexual activity: None   Lifestyle    Physical activity:     Days per week: None     Minutes per session: None    Stress: None   Relationships    Social connections:     Talks on phone: None     Gets together: None     Attends Christianity service: None     Active member of club or organization: None     Attends meetings of clubs or organizations: None     Relationship status: None    Intimate partner violence:     Fear of current or ex partner: None     Emotionally abused: None     Physically abused: None     Forced sexual activity: None   Other Topics Concern    None   Social History Narrative    None       Medications and Allergies:     Current Outpatient Medications   Medication Sig Dispense Refill    Blood Glucose Monitoring Suppl (ONETOUCH VERIO FLEX SYSTEM) w/Device KIT take by Subcutaneous route once      diazepam (VALIUM) 2 mg tablet Take one tablet every 6 hours as needed 30 tablet 0    glucose blood test strip tests once daily by fingerstick route      glyBURIDE micronized (GLYNASE) 3 mg tablet TAKE 1 TABLET FOUR TIMES DAILY 360 tablet 3    Lancets (ONETOUCH ULTRASOFT) lancets once daily by fingerstick route      levothyroxine 75 mcg tablet TAKE 1 TABLET EVERY DAY 90 tablet 3    lisinopril (ZESTRIL) 40 mg tablet TAKE 1 TABLET EVERY DAY 90 tablet 3    lovastatin (MEVACOR) 40 MG tablet TAKE 1 TABLET EVERY DAY 90 tablet 3    metFORMIN (GLUCOPHAGE) 1000 MG tablet TAKE 1 TABLET EVERY DAY 90 tablet 3    NIFEdipine ER (ADALAT CC) 60 MG 24 hr tablet TAKE 1 TABLET EVERY DAY 90 tablet 3     No current facility-administered medications for this visit        Allergies   Allergen Reactions    Acetaminophen GI Intolerance    Codeine      Other reaction(s): GI problems  Hydrochlorothiazide      Other reaction(s): HYPONATREMIA    Hydrocodone GI Intolerance    Ibuprofen      Other reaction(s): GI    Oxycodone      Other reaction(s): GI      Immunizations:     Immunization History   Administered Date(s) Administered    H1N1, All Formulations 01/06/2010    INFLUENZA 09/08/2016, 09/30/2018, 09/26/2019    Influenza Split 09/15/2010, 09/03/2013, 09/19/2014, 09/08/2016    Influenza Split High Dose Preservative Free IM 09/30/2018    Influenza TIV (IM) 11/23/2009, 09/09/2011, 09/01/2012    Pneumococcal Conjugate 13-Valent 06/08/2015    Pneumococcal Polysaccharide PPV23 12/24/2002    Zoster 11/24/2007    influenza, trivalent, adjuvanted 09/26/2019      Health Maintenance: There are no preventive care reminders to display for this patient  Topic Date Due    DTaP,Tdap,and Td Vaccines (1 - Tdap) 01/11/1944    Hepatitis B Vaccine (1 of 3 - Risk 3-dose series) 01/11/1952      Medicare Health Risk Assessment:     Ht 4' 11 45" (1 51 m)   Wt 61 6 kg (135 lb 14 4 oz)   BMI 27 03 kg/m²      Claude Blend is here for her Subsequent Wellness visit  Last Medicare Wellness visit information reviewed, patient interviewed and updates made to the record today  Health Risk Assessment:   Patient rates overall health as good  Patient feels that their physical health rating is slightly worse  Eyesight was rated as same  Hearing was rated as same  Patient feels that their emotional and mental health rating is same  Patient states that she has experienced no weight loss or gain in last 6 months  Depression Screening:   PHQ-2 Score: 0      Fall Risk Screening: In the past year, patient has experienced: history of falling in past year    Number of falls: 1  Injured during fall?: No    Feels unsteady when standing or walking?: Yes    Worried about falling?: Yes      Urinary Incontinence Screening:   Patient has leaked urine accidently in the last six months       Home Safety:  Patient has trouble with stairs inside or outside of their home  Patient has working smoke alarms Home safety hazards include: none  Nutrition:   Current diet is Regular  Medications:   Patient is currently taking over-the-counter supplements  OTC medications include: see medication list  Patient is able to manage medications  Activities of Daily Living (ADLs)/Instrumental Activities of Daily Living (IADLs):   Walk and transfer into and out of bed and chair?: Yes  Dress and groom yourself?: Yes    Bathe or shower yourself?: Yes    Feed yourself? Yes  Do your laundry/housekeeping?: Yes  Manage your money, pay your bills and track your expenses?: Yes  Make your own meals?: Yes    Do your own shopping?: Yes    Previous Hospitalizations:   Any hospitalizations or ED visits within the last 12 months?: Yes    How many hospitalizations have you had in the last year?: 1-2    PREVENTIVE SCREENINGS      Cardiovascular Screening:    General: Screening Not Indicated and History Lipid Disorder      Diabetes Screening:     General: Screening Not Indicated and History Diabetes      Colorectal Cancer Screening:     General: Screening Not Indicated      Cervical Cancer Screening:    General: Screening Not Indicated      Alfonso Chambers MD BMI Counseling: Body mass index is 27 03 kg/m²  The BMI is above normal  Nutrition recommendations include reducing portion sizes, decreasing overall calorie intake, 3-5 servings of fruits/vegetables daily, reducing fast food intake, consuming healthier snacks, decreasing soda and/or juice intake, moderation in carbohydrate intake, increasing intake of lean protein, reducing intake of saturated fat and trans fat and reducing intake of cholesterol  Exercise recommendations include exercising 3-5 times per week

## 2019-11-21 NOTE — PATIENT INSTRUCTIONS
Medicare Preventive Visit Patient Instructions  Thank you for completing your Welcome to Medicare Visit or Medicare Annual Wellness Visit today  Your next wellness visit will be due in one year (11/21/2020)  The screening/preventive services that you may require over the next 5-10 years are detailed below  Some tests may not apply to you based off risk factors and/or age  Screening tests ordered at today's visit but not completed yet may show as past due  Also, please note that scanned in results may not display below  Preventive Screenings:  Service Recommendations Previous Testing/Comments   Colorectal Cancer Screening  * Colonoscopy    * Fecal Occult Blood Test (FOBT)/Fecal Immunochemical Test (FIT)  * Fecal DNA/Cologuard Test  * Flexible Sigmoidoscopy Age: 54-65 years old   Colonoscopy: every 10 years (may be performed more frequently if at higher risk)  OR  FOBT/FIT: every 1 year  OR  Cologuard: every 3 years  OR  Sigmoidoscopy: every 5 years  Screening may be recommended earlier than age 48 if at higher risk for colorectal cancer  Also, an individualized decision between you and your healthcare provider will decide whether screening between the ages of 74-80 would be appropriate  Colonoscopy: Not on file  FOBT/FIT: Not on file  Cologuard: Not on file  Sigmoidoscopy: Not on file    Screening Not Indicated     Breast Cancer Screening Age: 36 years old  Frequency: every 1-2 years  Not required if history of left and right mastectomy Mammogram: Not on file       Cervical Cancer Screening Between the ages of 21-29, pap smear recommended once every 3 years  Between the ages of 33-67, can perform pap smear with HPV co-testing every 5 years     Recommendations may differ for women with a history of total hysterectomy, cervical cancer, or abnormal pap smears in past  Pap Smear: Not on file    Screening Not Indicated   Hepatitis C Screening Once for adults born between 1945 and 1965  More frequently in patients at high risk for Hepatitis C Hep C Antibody: Not on file       Diabetes Screening 1-2 times per year if you're at risk for diabetes or have pre-diabetes Fasting glucose: No results in last 5 years   A1C: 9 0 %    Screening Not Indicated  History Diabetes   Cholesterol Screening Once every 5 years if you don't have a lipid disorder  May order more often based on risk factors  Lipid panel: Not on file    Screening Not Indicated  History Lipid Disorder     Other Preventive Screenings Covered by Medicare:  1  Abdominal Aortic Aneurysm (AAA) Screening: covered once if your at risk  You're considered to be at risk if you have a family history of AAA  2  Lung Cancer Screening: covers low dose CT scan once per year if you meet all of the following conditions: (1) Age 50-69; (2) No signs or symptoms of lung cancer; (3) Current smoker or have quit smoking within the last 15 years; (4) You have a tobacco smoking history of at least 30 pack years (packs per day multiplied by number of years you smoked); (5) You get a written order from a healthcare provider  3  Glaucoma Screening: covered annually if you're considered high risk: (1) You have diabetes OR (2) Family history of glaucoma OR (3)  aged 48 and older OR (3)  American aged 72 and older  3  Osteoporosis Screening: covered every 2 years if you meet one of the following conditions: (1) You're estrogen deficient and at risk for osteoporosis based off medical history and other findings; (2) Have a vertebral abnormality; (3) On glucocorticoid therapy for more than 3 months; (4) Have primary hyperparathyroidism; (5) On osteoporosis medications and need to assess response to drug therapy  · Last bone density test (DXA Scan): Not on file  5  HIV Screening: covered annually if you're between the age of 12-76  Also covered annually if you are younger than 13 and older than 72 with risk factors for HIV infection   For pregnant patients, it is covered up to 3 times per pregnancy  Immunizations:  Immunization Recommendations   Influenza Vaccine Annual influenza vaccination during flu season is recommended for all persons aged >= 6 months who do not have contraindications   Pneumococcal Vaccine (Prevnar and Pneumovax)  * Prevnar = PCV13  * Pneumovax = PPSV23   Adults 25-60 years old: 1-3 doses may be recommended based on certain risk factors  Adults 72 years old: Prevnar (PCV13) vaccine recommended followed by Pneumovax (PPSV23) vaccine  If already received PPSV23 since turning 65, then PCV13 recommended at least one year after PPSV23 dose  Hepatitis B Vaccine 3 dose series if at intermediate or high risk (ex: diabetes, end stage renal disease, liver disease)   Tetanus (Td) Vaccine - COST NOT COVERED BY MEDICARE PART B Following completion of primary series, a booster dose should be given every 10 years to maintain immunity against tetanus  Td may also be given as tetanus wound prophylaxis  Tdap Vaccine - COST NOT COVERED BY MEDICARE PART B Recommended at least once for all adults  For pregnant patients, recommended with each pregnancy  Shingles Vaccine (Shingrix) - COST NOT COVERED BY MEDICARE PART B  2 shot series recommended in those aged 48 and above     Health Maintenance Due:  There are no preventive care reminders to display for this patient  Immunizations Due:      Topic Date Due    DTaP,Tdap,and Td Vaccines (1 - Tdap) 01/11/1944    Hepatitis B Vaccine (1 of 3 - Risk 3-dose series) 01/11/1952     Advance Directives   What are advance directives? Advance directives are legal documents that state your wishes and plans for medical care  These plans are made ahead of time in case you lose your ability to make decisions for yourself  Advance directives can apply to any medical decision, such as the treatments you want, and if you want to donate organs  What are the types of advance directives?   There are many types of advance directives, and each state has rules about how to use them  You may choose a combination of any of the following:  · Living will: This is a written record of the treatment you want  You can also choose which treatments you do not want, which to limit, and which to stop at a certain time  This includes surgery, medicine, IV fluid, and tube feedings  · Durable power of  for healthcare [facility]): This is a written record that states who you want to make healthcare choices for you when you are unable to make them for yourself  This person, called a proxy, is usually a family member or a friend  You may choose more than 1 proxy  · Do not resuscitate (DNR) order:  A DNR order is used in case your heart stops beating or you stop breathing  It is a request not to have certain forms of treatment, such as CPR  A DNR order may be included in other types of advance directives  · Medical directive: This covers the care that you want if you are in a coma, near death, or unable to make decisions for yourself  You can list the treatments you want for each condition  Treatment may include pain medicine, surgery, blood transfusions, dialysis, IV or tube feedings, and a ventilator (breathing machine)  · Values history: This document has questions about your views, beliefs, and how you feel and think about life  This information can help others choose the care that you would choose  Why are advance directives important? An advance directive helps you control your care  Although spoken wishes may be used, it is better to have your wishes written down  Spoken wishes can be misunderstood, or not followed  Treatments may be given even if you do not want them  An advance directive may make it easier for your family to make difficult choices about your care  Fall Prevention    Fall prevention  includes ways to make your home and other areas safer  It also includes ways you can move more carefully to prevent a fall   Health conditions that cause changes in your blood pressure, vision, or muscle strength and coordination may increase your risk for falls  Medicines may also increase your risk for falls if they make you dizzy, weak, or sleepy  Fall prevention tips:   · Stand or sit up slowly  · Use assistive devices as directed  · Wear shoes that fit well and have soles that   · Wear a personal alarm  · Stay active  · Manage your medical conditions  Home Safety Tips:  · Add items to prevent falls in the bathroom  · Keep paths clear  · Install bright lights in your home  · Keep items you use often on shelves within reach  · Paint or place reflective tape on the edges of your stairs  Urinary Incontinence   Urinary incontinence (UI)  is when you lose control of your bladder  UI develops because your bladder cannot store or empty urine properly  The 3 most common types of UI are stress incontinence, urge incontinence, or both  Medicines:   · May be given to help strengthen your bladder control  Report any side effects of medication to your healthcare provider  Do pelvic muscle exercises often:  Your pelvic muscles help you stop urinating  Squeeze these muscles tight for 5 seconds, then relax for 5 seconds  Gradually work up to squeezing for 10 seconds  Do 3 sets of 15 repetitions a day, or as directed  This will help strengthen your pelvic muscles and improve bladder control  Train your bladder:  Go to the bathroom at set times, such as every 2 hours, even if you do not feel the urge to go  You can also try to hold your urine when you feel the urge to go  For example, hold your urine for 5 minutes when you feel the urge to go  As that becomes easier, hold your urine for 10 minutes  Self-care:   · Keep a UI record  Write down how often you leak urine and how much you leak  Make a note of what you were doing when you leaked urine  · Drink liquids as directed   You may need to limit the amount of liquid you drink to help control your urine leakage  Do not drink any liquid right before you go to bed  Limit or do not have drinks that contain caffeine or alcohol  · Prevent constipation  Eat a variety of high-fiber foods  Good examples are high-fiber cereals, beans, vegetables, and whole-grain breads  Walking is the best way to trigger your intestines to have a bowel movement  · Exercise regularly and maintain a healthy weight  Weight loss and exercise will decrease pressure on your bladder and help you control your leakage  · Use a catheter as directed  to help empty your bladder  A catheter is a tiny, plastic tube that is put into your bladder to drain your urine  · Go to behavior therapy as directed  Behavior therapy may be used to help you learn to control your urge to urinate  Weight Management   Why it is important to manage your weight:  Being overweight increases your risk of health conditions such as heart disease, high blood pressure, type 2 diabetes, and certain types of cancer  It can also increase your risk for osteoarthritis, sleep apnea, and other respiratory problems  Aim for a slow, steady weight loss  Even a small amount of weight loss can lower your risk of health problems  How to lose weight safely:  A safe and healthy way to lose weight is to eat fewer calories and get regular exercise  You can lose up about 1 pound a week by decreasing the number of calories you eat by 500 calories each day  Healthy meal plan for weight management:  A healthy meal plan includes a variety of foods, contains fewer calories, and helps you stay healthy  A healthy meal plan includes the following:  · Eat whole-grain foods more often  A healthy meal plan should contain fiber  Fiber is the part of grains, fruits, and vegetables that is not broken down by your body  Whole-grain foods are healthy and provide extra fiber in your diet   Some examples of whole-grain foods are whole-wheat breads and pastas, oatmeal, brown rice, and bulgur  · Eat a variety of vegetables every day  Include dark, leafy greens such as spinach, kale, chip greens, and mustard greens  Eat yellow and orange vegetables such as carrots, sweet potatoes, and winter squash  · Eat a variety of fruits every day  Choose fresh or canned fruit (canned in its own juice or light syrup) instead of juice  Fruit juice has very little or no fiber  · Eat low-fat dairy foods  Drink fat-free (skim) milk or 1% milk  Eat fat-free yogurt and low-fat cottage cheese  Try low-fat cheeses such as mozzarella and other reduced-fat cheeses  · Choose meat and other protein foods that are low in fat  Choose beans or other legumes such as split peas or lentils  Choose fish, skinless poultry (chicken or turkey), or lean cuts of red meat (beef or pork)  Before you cook meat or poultry, cut off any visible fat  · Use less fat and oil  Try baking foods instead of frying them  Add less fat, such as margarine, sour cream, regular salad dressing and mayonnaise to foods  Eat fewer high-fat foods  Some examples of high-fat foods include french fries, doughnuts, ice cream, and cakes  · Eat fewer sweets  Limit foods and drinks that are high in sugar  This includes candy, cookies, regular soda, and sweetened drinks  Exercise:  Exercise at least 30 minutes per day on most days of the week  Some examples of exercise include walking, biking, dancing, and swimming  You can also fit in more physical activity by taking the stairs instead of the elevator or parking farther away from stores  Ask your healthcare provider about the best exercise plan for you  © Copyright [employer] 2018 Information is for End User's use only and may not be sold, redistributed or otherwise used for commercial purposes   All illustrations and images included in CareNotes® are the copyrighted property of [employer]  or [employer]    Weight Management   AMBULATORY CARE:   Why it is important to manage your weight:  Being overweight increases your risk of health conditions such as heart disease, high blood pressure, type 2 diabetes, and certain types of cancer  It can also increase your risk for osteoarthritis, sleep apnea, and other respiratory problems  Aim for a slow, steady weight loss  Even a small amount of weight loss can lower your risk of health problems  How to lose weight safely:  A safe and healthy way to lose weight is to eat fewer calories and get regular exercise  You can lose up about 1 pound a week by decreasing the number of calories you eat by 500 calories each day  You can decrease calories by eating smaller portion sizes or by cutting out high-calorie foods  Read labels to find out how many calories are in the foods you eat  You can also burn calories with exercise such as walking, swimming, or biking  You will be more likely to keep weight off if you make these changes part of your lifestyle  Healthy meal plan for weight management:  A healthy meal plan includes a variety of foods, contains fewer calories, and helps you stay healthy  A healthy meal plan includes the following:  · Eat whole-grain foods more often  A healthy meal plan should contain fiber  Fiber is the part of grains, fruits, and vegetables that is not broken down by your body  Whole-grain foods are healthy and provide extra fiber in your diet  Some examples of whole-grain foods are whole-wheat breads and pastas, oatmeal, brown rice, and bulgur  · Eat a variety of vegetables every day  Include dark, leafy greens such as spinach, kale, chip greens, and mustard greens  Eat yellow and orange vegetables such as carrots, sweet potatoes, and winter squash  · Eat a variety of fruits every day  Choose fresh or canned fruit (canned in its own juice or light syrup) instead of juice  Fruit juice has very little or no fiber  · Eat low-fat dairy foods  Drink fat-free (skim) milk or 1% milk   Eat fat-free yogurt and low-fat cottage cheese  Try low-fat cheeses such as mozzarella and other reduced-fat cheeses  · Choose meat and other protein foods that are low in fat  Choose beans or other legumes such as split peas or lentils  Choose fish, skinless poultry (chicken or turkey), or lean cuts of red meat (beef or pork)  Before you cook meat or poultry, cut off any visible fat  · Use less fat and oil  Try baking foods instead of frying them  Add less fat, such as margarine, sour cream, regular salad dressing and mayonnaise to foods  Eat fewer high-fat foods  Some examples of high-fat foods include french fries, doughnuts, ice cream, and cakes  · Eat fewer sweets  Limit foods and drinks that are high in sugar  This includes candy, cookies, regular soda, and sweetened drinks  Ways to decrease calories:   · Eat smaller portions  ¨ Use a small plate with smaller servings  ¨ Do not eat second helpings  ¨ When you eat at a restaurant, ask for a box and place half of your meal in the box before you eat  ¨ Share an entrée with someone else  · Replace high-calorie snacks with healthy, low-calorie snacks  ¨ Choose fresh fruit, vegetables, fat-free rice cakes, or air-popped popcorn instead of potato chips, nuts, or chocolate  ¨ Choose water or calorie-free drinks instead of soda or sweetened drinks  · Eat regular meals  Skipping meals can lead to overeating later in the day  Eat a healthy snack in place of a meal if you do not have time to eat a regular meal      · Do not shop for groceries when you are hungry  You may be more likely to make unhealthy food choices  Take a grocery list of healthy foods and shop after you have eaten  Exercise:  Exercise at least 30 minutes per day on most days of the week  Some examples of exercise include walking, biking, dancing, and swimming  You can also fit in more physical activity by taking the stairs instead of the elevator or parking farther away from stores  Ask your healthcare provider about the best exercise plan for you  Other things to consider as you try to lose weight:   · Be aware of situations that may give you the urge to overeat, such as eating while watching television  Find ways to avoid these situations  For example, read a book, go for a walk, or do crafts  · Meet with a weight loss support group or friends who are also trying to lose weight  This may help you stay motivated to continue working on your weight loss goals  © 2017 [employer] Information is for End User's use only and may not be sold, redistributed or otherwise used for commercial purposes  All illustrations and images included in CareNotes® are the copyrighted property of [employer] Inc  or [employer]  The above information is an  only  It is not intended as medical advice for individual conditions or treatments  Talk to your doctor, nurse or pharmacist before following any medical regimen to see if it is safe and effective for you  Heart Healthy Diet   AMBULATORY CARE:   A heart healthy diet  is an eating plan low in total fat, unhealthy fats, and sodium (salt)  A heart healthy diet helps decrease your risk for heart disease and stroke  Limit the amount of fat you eat to 25% to 35% of your total daily calories  Limit sodium to less than 2,300 mg each day  Healthy fats:  Healthy fats can help improve cholesterol levels  The risk for heart disease is decreased when cholesterol levels are normal  Choose healthy fats, such as the following:  · Unsaturated fat  is found in foods such as soybean, canola, olive, corn, and safflower oils  It is also found in soft tub margarine that is made with liquid vegetable oil  · Omega-3 fat  is found in certain fish, such as salmon, tuna, and trout, and in walnuts and flaxseed    Unhealthy fats:  Unhealthy fats can cause unhealthy cholesterol levels in your blood and increase your risk of heart disease  Limit unhealthy fats, such as the following:  · Cholesterol  is found in animal foods, such as eggs and lobster, and in dairy products made from whole milk  Limit cholesterol to less than 300 milligrams (mg) each day  You may need to limit cholesterol to 200 mg each day if you have heart disease  · Saturated fat  is found in meats, such as simon and hamburger  It is also found in chicken or turkey skin, whole milk, and butter  Limit saturated fat to less than 7% of your total daily calories  Limit saturated fat to less than 6% if you have heart disease or are at increased risk for it  · Trans fat  is found in packaged foods, such as potato chips and cookies  It is also in hard margarine, some fried foods, and shortening  Avoid trans fats as much as possible    Heart healthy foods and drinks to include:  Ask your dietitian or healthcare provider how many servings to have from each of the following food groups:  · Grains:      ¨ Whole-wheat breads, cereals, and pastas, and brown rice    ¨ Low-fat, low-sodium crackers and chips    · Vegetables:      ¨ Broccoli, green beans, green peas, and spinach    ¨ Collards, kale, and lima beans    ¨ Carrots, sweet potatoes, tomatoes, and peppers    ¨ Canned vegetables with no salt added    · Fruits:      ¨ Bananas, peaches, pears, and pineapple    ¨ Grapes, raisins, and dates    ¨ Oranges, tangerines, grapefruit, orange juice, and grapefruit juice    ¨ Apricots, mangoes, melons, and papaya    ¨ Raspberries and strawberries    ¨ Canned fruit with no added sugar    · Low-fat dairy products:      ¨ Nonfat (skim) milk, 1% milk, and low-fat almond, cashew, or soy milks fortified with calcium    ¨ Low-fat cheese, regular or frozen yogurt, and cottage cheese    · Meats and proteins , such as lean cuts of beef and pork (loin, leg, round), skinless chicken and turkey, legumes, soy products, egg whites, and nuts  Foods and drinks to limit or avoid:  Ask your dietitian or healthcare provider about these and other foods that are high in unhealthy fat, sodium, and sugar:  · Snack or packaged foods , such as frozen dinners, cookies, macaroni and cheese, and cereals with more than 300 mg of sodium per serving    · Canned or dry mixes  for cakes, soups, sauces, or gravies    · Vegetables with added sodium , such as instant potatoes, vegetables with added sauces, or regular canned vegetables    · Other foods high in sodium , such as ketchup, barbecue sauce, salad dressing, pickles, olives, soy sauce, and miso    · High-fat dairy foods  such as whole or 2% milk, cream cheese, or sour cream, and cheeses     · High-fat protein foods  such as high-fat cuts of beef (T-bone steaks, ribs), chicken or turkey with skin, and organ meats, such as liver    · Cured or smoked meats , such as hot dogs, simon, and sausage    · Unhealthy fats and oils , such as butter, stick margarine, shortening, and cooking oils such as coconut or palm oil    · Food and drinks high in sugar , such as soft drinks (soda), sports drinks, sweetened tea, candy, cake, cookies, pies, and doughnuts  Other diet guidelines to follow:   · Eat more foods containing omega-3 fats  Eat fish high in omega-3 fats at least 2 times a week  · Limit alcohol  Too much alcohol can damage your heart and raise your blood pressure  Women should limit alcohol to 1 drink a day  Men should limit alcohol to 2 drinks a day  A drink of alcohol is 12 ounces of beer, 5 ounces of wine, or 1½ ounces of liquor  · Choose low-sodium foods  High-sodium foods can lead to high blood pressure  Add little or no salt to food you prepare  Use herbs and spices in place of salt  · Eat more fiber  to help lower cholesterol levels  Eat at least 5 servings of fruits and vegetables each day  Eat 3 ounces of whole-grain foods each day  Legumes (beans) are also a good source of fiber  Lifestyle guidelines:   · Do not smoke    Nicotine and other chemicals in cigarettes and cigars can cause lung and heart damage  Ask your healthcare provider for information if you currently smoke and need help to quit  E-cigarettes or smokeless tobacco still contain nicotine  Talk to your healthcare provider before you use these products  · Exercise regularly  to help you maintain a healthy weight and improve your blood pressure and cholesterol levels  Ask your healthcare provider about the best exercise plan for you  Do not start an exercise program without asking your healthcare provider  Follow up with your healthcare provider as directed:  Write down your questions so you remember to ask them during your visits  © 2017 [employer] Information is for End User's use only and may not be sold, redistributed or otherwise used for commercial purposes  All illustrations and images included in CareNotes® are the copyrighted property of [employer]  or Mickey Johny  The above information is an  only  It is not intended as medical advice for individual conditions or treatments  Talk to your doctor, nurse or pharmacist before following any medical regimen to see if it is safe and effective for you  Calorie Counting Diet   WHAT YOU NEED TO KNOW:   What is a calorie counting diet? It is a meal plan based on counting calories each day to reach a healthy body weight  You will need to eat fewer calories if you are trying to lose weight  Weight loss may decrease your risk for certain health problems or improve your health if you have health problems  Some of these health problems include heart disease, high blood pressure, and diabetes  What foods should I avoid? Your dietitian will tell you if you need to avoid certain foods based on your body weight and health condition  You may need to avoid high-fat foods if you are at risk for or have heart disease   You may need to eat fewer foods from the breads and starches food group if you have diabetes  How many calories are in foods? The following is a list of foods and drinks with the approximate number of calories in each  Check the food label to find the exact number of calories  A dietitian can tell you how many calories you should have from each food group each day    · Carbohydrate:      ¨ ½ of a 3-inch bagel, 1 slice of bread, or ½ of a hamburger bun or hot dog bun (80)    ¨ 1 (8-inch) flour tortilla or ½ cup of cooked rice (100)    ¨ 1 (6-inch) corn tortilla (80)    ¨ 1 (6-inch) pancake or 1 cup of bran flakes cereal (110)    ¨ ½ cup of cooked cereal (80)    ¨ ½ cup of cooked pasta (85)    ¨ 1 ounce of pretzels (100)    ¨ 3 cups of air-popped popcorn without butter or oil (80)    · Dairy:      ¨ 1 cup of skim or 1% milk (90)    ¨ 1 cup of 2% milk (120)    ¨ 1 cup of whole milk (160)    ¨ 1 cup of 2% chocolate milk (220)    ¨ 1 ounce of low-fat cheese with 3 grams of fat per ounce (70)    ¨ 1 ounce of cheddar cheese (114)    ¨ ½ cup of 1% fat cottage cheese (80)    ¨ 1 cup of plain or sugar-free, fat-free yogurt (90)    · Protein foods:      ¨ 3 ounces of fish (not breaded or fried) (95)    ¨ 3 ounces of breaded, fried fish (195)    ¨ ¾ cup of tuna canned in water (105)    ¨ 3 ounces of chicken breast without skin (105)    ¨ 1 fried chicken breast with skin (350)    ¨ ¼ cup of fat free egg substitute (40)    ¨ 1 large egg (75)    ¨ 3 ounces of lean beef or pork (165)    ¨ 3 ounces of fried pork chop or ham (185)    ¨ ½ cup of cooked dried beans, such as kidney, [place], lentils, or navy (115)    ¨ 3 ounces of bologna or lunch meat (225)    ¨ 2 links of breakfast sausage (140)    · Vegetables:      ¨ ½ cup of sliced mushrooms (10)    ¨ 1 cup of salad greens, such as lettuce, spinach, or mady (15)    ¨ ½ cup of steamed asparagus (20)    ¨ ½ cup of cooked summer squash, zucchini squash, or green or wax beans (25)    ¨ 1 cup of broccoli or cauliflower florets, or 1 medium tomato (25)    ¨ 1 large raw carrot or ½ cup of cooked carrots (40)    ¨ ? of a medium cucumber or 1 stalk of celery (5)    ¨ 1 small baked potato (160)    ¨ 1 cup of breaded, fried vegetables (230)    · Fruit:      ¨ 1 (6-inch) banana (55)     ¨ ½ of a 4-inch grapefruit (55)    ¨ 15 grapes (60)    ¨ 1 medium orange or apple (70)    ¨ 1 large peach (65)    ¨ 1 cup of fresh pineapple chunks (75)    ¨ 1 cup of melon cubes (50)    ¨ 1¼ cups of whole strawberries (45)    ¨ ½ cup of fruit canned in juice (55)    ¨ ½ cup of fruit canned in heavy syrup (110)    ¨ ?  cup of raisins (130)    ¨ ½ cup of unsweetened fruit juice (60)    ¨ ½ cup of grape, cranberry, or prune juice (90)    · Fat:      ¨ 10 peanuts or 2 teaspoons of peanut butter (55)    ¨ 2 tablespoons of avocado or 1 tablespoon of regular salad dressing (45)    ¨ 2 slices of simon (90)    ¨ 1 teaspoon of oil, such as safflower, canola, corn, or olive oil (45)    ¨ 2 teaspoons of low-fat margarine, or 1 tablespoon of low-fat mayonnaise (50)    ¨ 1 teaspoon of regular margarine (40)    ¨ 1 tablespoon of regular mayonnaise (135)    ¨ 1 tablespoon of cream cheese or 2 tablespoons of low-fat cream cheese (45)    ¨ 2 tablespoons of vegetable shortening (215)    · Dessert and sweets:      ¨ 8 animal crackers or 5 vanilla wafers (80)    ¨ 1 frozen fruit juice bar (80)    ¨ ½ cup of ice milk or low-fat frozen yogurt (90)    ¨ ½ cup of sherbet or sorbet (125)    ¨ ½ cup of sugar-free pudding or custard (60)    ¨ ½ cup of ice cream (140)    ¨ ½ cup of pudding or custard (175)    ¨ 1 (2-inch) square chocolate brownie (185)    · Combination foods:      ¨ Bean burrito made with an 8-inch tortilla, without cheese (275)    ¨ Chicken breast sandwich with lettuce and tomato (325)    ¨ 1 cup of chicken noodle soup (60)    ¨ 1 beef taco (175)    ¨ Regular hamburger with lettuce and tomato (310)    ¨ Regular cheeseburger with lettuce and tomato (410)     ¨ ¼ of a 12-inch cheese pizza (280)    ¨ Fried fish sandwich with lettuce and tomato (425)    ¨ Hot dog and bun (275)    ¨ 1½ cups of macaroni and cheese (310)    ¨ Taco salad with a fried tortilla shell (870)    · Low-calorie foods:      ¨ 1 tablespoon of ketchup or 1 tablespoon of fat free sour cream (15)    ¨ 1 teaspoon of mustard (5)    ¨ ¼ cup of salsa (20)    ¨ 1 large dill pickle (15)    ¨ 1 tablespoon of fat free salad dressing (10)    ¨ 2 teaspoons of low-sugar, light jam or jelly, or 1 tablespoon of sugar-free syrup (15)    ¨ 1 sugar-free popsicle (15)    ¨ 1 cup of club soda, seltzer water, or diet soda (0)  CARE AGREEMENT:   You have the right to help plan your care  Discuss treatment options with your caregivers to decide what care you want to receive  You always have the right to refuse treatment  The above information is an  only  It is not intended as medical advice for individual conditions or treatments  Talk to your doctor, nurse or pharmacist before following any medical regimen to see if it is safe and effective for you  © 2017 [address] Information is for End User's use only and may not be sold, redistributed or otherwise used for commercial purposes  All illustrations and images included in CareNotes® are the copyrighted property of [employer]  or Mickey Andrade ES : 2 every 18 hrs is good for arthritic pain;       CURRENT [employer] TIPS ON EXERCISE:    IF YOU HAVE CONDITIONS THAT PREVENT AEROBIC EXERCISE:    WALK 30 MINUTES AT LEAST 5 DAYS PER WEEK; MAY BREAK IT UP INTO 10-  15 MINUTE SESSIONS   GET SOME RESISTANCE EXERCISES USING THE MAJOR MUSCLE GROUPS 2-3 TIMES PER WEEK     IF YOU ARE PHYSICALLY ABLE:    TRY TO GET 10,000 STEPS 3 TIMES PER WEEK  PLUS  GO "ONLINE" TO CHECK TARGET HEART RATE FOR YOUR AGE AND DO AEROBIC EXERCISES (JOG/STATIONARY BIKE/ELLIPTICAL) FOR 20-30 MINUTES 2-3 TIMES PER WEEK        Reduce glyburide to 2 every morning;       Medicare Preventive Visit Patient Instructions  Thank you for completing your Welcome to Medicare Visit or Medicare Annual Wellness Visit today  Your next wellness visit will be due in one year (11/21/2020)  The screening/preventive services that you may require over the next 5-10 years are detailed below  Some tests may not apply to you based off risk factors and/or age  Screening tests ordered at today's visit but not completed yet may show as past due  Also, please note that scanned in results may not display below  Preventive Screenings:  Service Recommendations Previous Testing/Comments   Colorectal Cancer Screening  * Colonoscopy    * Fecal Occult Blood Test (FOBT)/Fecal Immunochemical Test (FIT)  * Fecal DNA/Cologuard Test  * Flexible Sigmoidoscopy Age: 54-65 years old   Colonoscopy: every 10 years (may be performed more frequently if at higher risk)  OR  FOBT/FIT: every 1 year  OR  Cologuard: every 3 years  OR  Sigmoidoscopy: every 5 years  Screening may be recommended earlier than age 48 if at higher risk for colorectal cancer  Also, an individualized decision between you and your healthcare provider will decide whether screening between the ages of 74-80 would be appropriate  Colonoscopy: Not on file  FOBT/FIT: Not on file  Cologuard: Not on file  Sigmoidoscopy: Not on file    Screening Not Indicated  Screening Not Indicated     Breast Cancer Screening Age: 36 years old  Frequency: every 1-2 years  Not required if history of left and right mastectomy Mammogram: Not on file    Screening Not Indicated   Cervical Cancer Screening Between the ages of 21-29, pap smear recommended once every 3 years  Between the ages of 33-67, can perform pap smear with HPV co-testing every 5 years     Recommendations may differ for women with a history of total hysterectomy, cervical cancer, or abnormal pap smears in past  Pap Smear: Not on file    Screening Not Indicated  Screening Not Indicated   Hepatitis C Screening Once for adults born between [facility]  More frequently in patients at high risk for Hepatitis C Hep C Antibody: Not on file    Screening Not Indicated   Diabetes Screening 1-2 times per year if you're at risk for diabetes or have pre-diabetes Fasting glucose: No results in last 5 years   A1C: 9 0 %    Screening Not Indicated  History Diabetes  Screening Not Indicated  History Diabetes  Screening Current   Cholesterol Screening Once every 5 years if you don't have a lipid disorder  May order more often based on risk factors  Lipid panel: Not on file    Screening Not Indicated  History Lipid Disorder  Screening Not Indicated  History Lipid Disorder  Screening Current     Other Preventive Screenings Covered by Medicare:  6  Abdominal Aortic Aneurysm (AAA) Screening: covered once if your at risk  You're considered to be at risk if you have a family history of AAA  7  Lung Cancer Screening: covers low dose CT scan once per year if you meet all of the following conditions: (1) Age 50-69; (2) No signs or symptoms of lung cancer; (3) Current smoker or have quit smoking within the last 15 years; (4) You have a tobacco smoking history of at least 30 pack years (packs per day multiplied by number of years you smoked); (5) You get a written order from a healthcare provider  8  Glaucoma Screening: covered annually if you're considered high risk: (1) You have diabetes OR (2) Family history of glaucoma OR (3)  aged 48 and older OR (3)  American aged 72 and older  5  Osteoporosis Screening: covered every 2 years if you meet one of the following conditions: (1) You're estrogen deficient and at risk for osteoporosis based off medical history and other findings; (2) Have a vertebral abnormality; (3) On glucocorticoid therapy for more than 3 months; (4) Have primary hyperparathyroidism; (5) On osteoporosis medications and need to assess response to drug therapy  · Last bone density test (DXA Scan): Not on file    10  HIV Screening: covered annually if you're between the age of 15-65  Also covered annually if you are younger than 13 and older than 72 with risk factors for HIV infection  For pregnant patients, it is covered up to 3 times per pregnancy  Immunizations:  Immunization Recommendations   Influenza Vaccine Annual influenza vaccination during flu season is recommended for all persons aged >= 6 months who do not have contraindications   Pneumococcal Vaccine (Prevnar and Pneumovax)  * Prevnar = PCV13  * Pneumovax = PPSV23   Adults 25-60 years old: 1-3 doses may be recommended based on certain risk factors  Adults 72 years old: Prevnar (PCV13) vaccine recommended followed by Pneumovax (PPSV23) vaccine  If already received PPSV23 since turning 65, then PCV13 recommended at least one year after PPSV23 dose  Hepatitis B Vaccine 3 dose series if at intermediate or high risk (ex: diabetes, end stage renal disease, liver disease)   Tetanus (Td) Vaccine - COST NOT COVERED BY MEDICARE PART B Following completion of primary series, a booster dose should be given every 10 years to maintain immunity against tetanus  Td may also be given as tetanus wound prophylaxis  Tdap Vaccine - COST NOT COVERED BY MEDICARE PART B Recommended at least once for all adults  For pregnant patients, recommended with each pregnancy  Shingles Vaccine (Shingrix) - COST NOT COVERED BY MEDICARE PART B  2 shot series recommended in those aged 48 and above     Health Maintenance Due:  There are no preventive care reminders to display for this patient  Immunizations Due:      Topic Date Due    DTaP,Tdap,and Td Vaccines (1 - Tdap) 01/11/1944    Hepatitis B Vaccine (1 of 3 - Risk 3-dose series) 01/11/1952     Advance Directives   What are advance directives? Advance directives are legal documents that state your wishes and plans for medical care  These plans are made ahead of time in case you lose your ability to make decisions for yourself   Advance directives can apply to any medical decision, such as the treatments you want, and if you want to donate organs  What are the types of advance directives? There are many types of advance directives, and each state has rules about how to use them  You may choose a combination of any of the following:  · Living will: This is a written record of the treatment you want  You can also choose which treatments you do not want, which to limit, and which to stop at a certain time  This includes surgery, medicine, IV fluid, and tube feedings  · Durable power of  for healthcare [facility]): This is a written record that states who you want to make healthcare choices for you when you are unable to make them for yourself  This person, called a proxy, is usually a family member or a friend  You may choose more than 1 proxy  · Do not resuscitate (DNR) order:  A DNR order is used in case your heart stops beating or you stop breathing  It is a request not to have certain forms of treatment, such as CPR  A DNR order may be included in other types of advance directives  · Medical directive: This covers the care that you want if you are in a coma, near death, or unable to make decisions for yourself  You can list the treatments you want for each condition  Treatment may include pain medicine, surgery, blood transfusions, dialysis, IV or tube feedings, and a ventilator (breathing machine)  · Values history: This document has questions about your views, beliefs, and how you feel and think about life  This information can help others choose the care that you would choose  Why are advance directives important? An advance directive helps you control your care  Although spoken wishes may be used, it is better to have your wishes written down  Spoken wishes can be misunderstood, or not followed  Treatments may be given even if you do not want them   An advance directive may make it easier for your family to make difficult choices about your care  Fall Prevention    Fall prevention  includes ways to make your home and other areas safer  It also includes ways you can move more carefully to prevent a fall  Health conditions that cause changes in your blood pressure, vision, or muscle strength and coordination may increase your risk for falls  Medicines may also increase your risk for falls if they make you dizzy, weak, or sleepy  Fall prevention tips:   · Stand or sit up slowly  · Use assistive devices as directed  · Wear shoes that fit well and have soles that   · Wear a personal alarm  · Stay active  · Manage your medical conditions  Home Safety Tips:  · Add items to prevent falls in the bathroom  · Keep paths clear  · Install bright lights in your home  · Keep items you use often on shelves within reach  · Paint or place reflective tape on the edges of your stairs  Urinary Incontinence   Urinary incontinence (UI)  is when you lose control of your bladder  UI develops because your bladder cannot store or empty urine properly  The 3 most common types of UI are stress incontinence, urge incontinence, or both  Medicines:   · May be given to help strengthen your bladder control  Report any side effects of medication to your healthcare provider  Do pelvic muscle exercises often:  Your pelvic muscles help you stop urinating  Squeeze these muscles tight for 5 seconds, then relax for 5 seconds  Gradually work up to squeezing for 10 seconds  Do 3 sets of 15 repetitions a day, or as directed  This will help strengthen your pelvic muscles and improve bladder control  Train your bladder:  Go to the bathroom at set times, such as every 2 hours, even if you do not feel the urge to go  You can also try to hold your urine when you feel the urge to go  For example, hold your urine for 5 minutes when you feel the urge to go  As that becomes easier, hold your urine for 10 minutes     Self-care:   · Keep a UI record  Write down how often you leak urine and how much you leak  Make a note of what you were doing when you leaked urine  · Drink liquids as directed  You may need to limit the amount of liquid you drink to help control your urine leakage  Do not drink any liquid right before you go to bed  Limit or do not have drinks that contain caffeine or alcohol  · Prevent constipation  Eat a variety of high-fiber foods  Good examples are high-fiber cereals, beans, vegetables, and whole-grain breads  Walking is the best way to trigger your intestines to have a bowel movement  · Exercise regularly and maintain a healthy weight  Weight loss and exercise will decrease pressure on your bladder and help you control your leakage  · Use a catheter as directed  to help empty your bladder  A catheter is a tiny, plastic tube that is put into your bladder to drain your urine  · Go to behavior therapy as directed  Behavior therapy may be used to help you learn to control your urge to urinate  Weight Management   Why it is important to manage your weight:  Being overweight increases your risk of health conditions such as heart disease, high blood pressure, type 2 diabetes, and certain types of cancer  It can also increase your risk for osteoarthritis, sleep apnea, and other respiratory problems  Aim for a slow, steady weight loss  Even a small amount of weight loss can lower your risk of health problems  How to lose weight safely:  A safe and healthy way to lose weight is to eat fewer calories and get regular exercise  You can lose up about 1 pound a week by decreasing the number of calories you eat by 500 calories each day  Healthy meal plan for weight management:  A healthy meal plan includes a variety of foods, contains fewer calories, and helps you stay healthy  A healthy meal plan includes the following:  · Eat whole-grain foods more often  A healthy meal plan should contain fiber   Fiber is the part of grains, fruits, and vegetables that is not broken down by your body  Whole-grain foods are healthy and provide extra fiber in your diet  Some examples of whole-grain foods are whole-wheat breads and pastas, oatmeal, brown rice, and bulgur  · Eat a variety of vegetables every day  Include dark, leafy greens such as spinach, kale, chip greens, and mustard greens  Eat yellow and orange vegetables such as carrots, sweet potatoes, and winter squash  · Eat a variety of fruits every day  Choose fresh or canned fruit (canned in its own juice or light syrup) instead of juice  Fruit juice has very little or no fiber  · Eat low-fat dairy foods  Drink fat-free (skim) milk or 1% milk  Eat fat-free yogurt and low-fat cottage cheese  Try low-fat cheeses such as mozzarella and other reduced-fat cheeses  · Choose meat and other protein foods that are low in fat  Choose beans or other legumes such as split peas or lentils  Choose fish, skinless poultry (chicken or turkey), or lean cuts of red meat (beef or pork)  Before you cook meat or poultry, cut off any visible fat  · Use less fat and oil  Try baking foods instead of frying them  Add less fat, such as margarine, sour cream, regular salad dressing and mayonnaise to foods  Eat fewer high-fat foods  Some examples of high-fat foods include french fries, doughnuts, ice cream, and cakes  · Eat fewer sweets  Limit foods and drinks that are high in sugar  This includes candy, cookies, regular soda, and sweetened drinks  Exercise:  Exercise at least 30 minutes per day on most days of the week  Some examples of exercise include walking, biking, dancing, and swimming  You can also fit in more physical activity by taking the stairs instead of the elevator or parking farther away from stores  Ask your healthcare provider about the best exercise plan for you        © Copyright [employer] 2018 Information is for End User's use only and may not be sold, redistributed or otherwise used for commercial purposes   All illustrations and images included in CareNotes® are the copyrighted property of A D A M , Inc  or [facility][note 3] Meliton Porras

## 2019-11-21 NOTE — PROGRESS NOTES
Assessment/Plan:    No problem-specific Assessment & Plan notes found for this encounter  Diagnoses and all orders for this visit:    Type 2 diabetes mellitus without complication, without long-term current use of insulin (HCC)  -     HEMOGLOBIN A1C W/ EAG ESTIMATION; Future  -     Microalbumin / creatinine urine ratio    BMI 27 0-27 9,adult    Hypothyroidism, unspecified type    Essential hypertension    Microalbuminuria    Other hyperlipidemia    Anxiety          Subjective:      Patient ID: Meenakshi Mckinney is a 80 y o  female  PATIENT RETURNS FOR FOLLOW-UP OF CHRONIC MEDICAL CONDITIONS  NO HOSPITAL STAYS OR EMERGENCY VISITS RECENTLY  MEDS WERE REVIEWED AND NO SIDE EFFECTS  NO NEW ISSUES  UNLESS NOTED BELOW  NO NEW MEDICAL PROVIDER REPORTED  THE CHRONIC DISEASES LISTED ABOVE ARE STABLE AND UNCHANGED/ THE PLAN OF CARE FOR THOSE WILL REMAIN UNCHANGED UNLESS NOTED BELOW  AWV/sub          The following portions of the patient's history were reviewed and updated as appropriate: allergies, current medications, past family history, past medical history, past social history, past surgical history and problem list     Review of Systems      Objective:  Vitals:    11/21/19 0943   BP: 124/70   BP Location: Left arm   Patient Position: Sitting   Cuff Size: Adult   Pulse: 97   Temp: 97 5 °F (36 4 °C)   TempSrc: Temporal   SpO2: 98%   Weight: 61 6 kg (135 lb 14 4 oz)   Height: 4' 11 45" (1 51 m)      Physical Exam     Assessment and Plan:     Problem List Items Addressed This Visit        Endocrine    Type 2 diabetes mellitus ([address] ) - Primary    Hypothyroid       Cardiovascular and Mediastinum    Hypertension       Other    Anxiety    Hyperlipidemia    Microalbuminuria      Other Visit Diagnoses     BMI 27 0-27 9,adult               Preventive health issues were discussed with patient, and age appropriate screening tests were ordered as noted in patient's After Visit Summary    Personalized health advice and appropriate referrals for health education or preventive services given if needed, as noted in patient's After Visit Summary       History of Present Illness:     Patient presents for Medicare Annual Wellness visit    Patient Care Team:  Edgard Vuong MD as PCP - General (Family Medicine)     Problem List:     Patient Active Problem List   Diagnosis    Anxiety    Osteoarthritis    Type [facility] diabetes mellitus ([facility] )    Hyperlipidemia    Hypertension    Hypothyroid    Microalbuminuria    Neuropathy      Past Medical and Surgical History:     Past Medical History:   Diagnosis Date    Abnormal mammogram 2008    Anxiety 5/15/2014    Carcinoid tumor     Diabetes mellitus ([facility] )     Disease of thyroid gland     High cholesterol     Hyperlipidemia     Hypertension     Osteoarthritis 7/24/2008    Sciatica 2008     Past Surgical History:   Procedure Laterality Date    APPENDECTOMY      incidental finding at appendectomy    LUMBAR DISC SURGERY      TOTAL ABDOMINAL HYSTERECTOMY W/ BILATERAL SALPINGOOPHORECTOMY      benign    TUMOR REMOVAL      carcinoid tumor surgery      Family History:     Family History   Problem Relation Age of Onset    Liver cancer Mother     Hyperlipidemia Father     Heart attack Father     Heart disease Maternal Grandfather       Social History:     Social History     Socioeconomic History    Marital status: /Civil Union     Spouse name: None    Number of children: None    Years of education: None    Highest education level: None   Occupational History    Occupation: retired   Social Needs    Financial resource strain: None    Food insecurity:     Worry: None     Inability: None    Transportation needs:     Medical: None     Non-medical: None   Tobacco Use    Smoking status: Never Smoker    Smokeless tobacco: Never Used   Substance and Sexual Activity    Alcohol use: No    Drug use: No    Sexual activity: None   Lifestyle    Physical activity:     Days per week: None     Minutes per session: None    Stress: None   Relationships    Social connections:     Talks on phone: None     Gets together: None     Attends [employer] service: None     Active member of club or organization: None     Attends meetings of clubs or organizations: None     Relationship status: None    Intimate partner violence:     Fear of current or ex partner: None     Emotionally abused: None     Physically abused: None     Forced sexual activity: None   Other Topics Concern    None   Social History Narrative    None       Medications and Allergies:     Current Outpatient Medications   Medication Sig Dispense Refill    Blood Glucose Monitoring Suppl ([address]) w/Device KIT take by Subcutaneous route once      diazepam (VALIUM) 2 mg tablet Take one tablet every 6 hours as needed 30 tablet 0    glucose blood test strip tests once daily by fingerstick route      glyBURIDE micronized (GLYNASE) 3 mg tablet TAKE 1 TABLET FOUR TIMES DAILY 360 tablet 3    Lancets (ONETOUCH ULTRASOFT) lancets once daily by fingerstick route      levothyroxine 75 mcg tablet TAKE 1 TABLET EVERY DAY 90 tablet 3    lisinopril (ZESTRIL) 40 mg tablet TAKE 1 TABLET EVERY DAY 90 tablet 3    lovastatin (MEVACOR) 40 MG tablet TAKE 1 TABLET EVERY DAY 90 tablet 3    metFORMIN (GLUCOPHAGE) 1000 MG tablet TAKE 1 TABLET EVERY DAY 90 tablet 3    NIFEdipine ER (ADALAT CC) 60 MG 24 hr tablet TAKE 1 TABLET EVERY DAY 90 tablet 3     No current facility-administered medications for this visit        Allergies   Allergen Reactions    Acetaminophen GI Intolerance    Codeine      Other reaction(s): GI problems    Hydrochlorothiazide      Other reaction(s): HYPONATREMIA    Hydrocodone GI Intolerance    Ibuprofen      Other reaction(s): GI    Oxycodone      Other reaction(s): GI      Immunizations:     Immunization History   Administered Date(s) Administered    H1N1, All Formulations 01/06/2010    INFLUENZA 09/08/2016, 09/30/2018, 09/26/2019    Influenza Split 09/15/2010, 09/03/2013, 09/19/2014, 09/08/2016    Influenza Split High Dose Preservative Free IM 09/30/2018    Influenza TIV (IM) 11/23/2009, 09/09/2011, 09/01/2012    Pneumococcal Conjugate 13-Valent 06/08/2015    Pneumococcal Polysaccharide PPV23 12/24/2002    Zoster 11/24/2007    influenza, trivalent, adjuvanted 09/26/2019      Health Maintenance: There are no preventive care reminders to display for this patient  Topic Date Due    DTaP,Tdap,and Td Vaccines (1 - Tdap) 01/11/1944    Hepatitis B Vaccine (1 of 3 - Risk 3-dose series) 01/11/1952      Medicare Health Risk Assessment:     /70 (BP Location: Left arm, Patient Position: Sitting, Cuff Size: Adult)   Pulse 97   Temp 97 5 °F (36 4 °C) (Temporal)   Ht 4' 11 45" (1 51 m)   Wt 61 6 kg (135 lb 14 4 oz)   SpO2 98%   BMI 27 03 kg/m²      Esteban Escalante is here for her Subsequent Wellness visit  Last Medicare Wellness visit information reviewed, patient interviewed, no change since last AWV  Depression Screening:   PHQ-2 Score: 0      Previous Hospitalizations:   Any hospitalizations or ED visits within the last 12 months?: No      Hospitalization Comments: ER visit  March : side pains    Advance Care Planning:   Living will: Yes    Durable POA for healthcare:  Yes    Advanced directive: Yes      Cognitive Screening:   Provider or family/friend/caregiver concerned regarding cognition?: No    PREVENTIVE SCREENINGS      Cardiovascular Screening:    General: Screening Not Indicated, History Lipid Disorder and Screening Current      Diabetes Screening:     General: Screening Not Indicated, History Diabetes and Screening Current      Colorectal Cancer Screening:     General: Screening Not Indicated      Breast Cancer Screening:     General: Screening Not Indicated      Cervical Cancer Screening:    General: Screening Not Indicated      Osteoporosis Screening:    General: Screening Current      Lung Cancer Screening:     General: Screening Not Indicated      Hepatitis C Screening:    General: Screening Not Indicated    Other Counseling Topics:   Regular weightbearing exercise  Gets reg eye and foot   cks     Patient's chronic problems that were reviewed today are stable  Meds reviewed and no changes made  Appropriate labs and imaging were ordered  Preventive measures appropriate for age and sex were reviewed with patient  Immunizations were updated as appropriate         Jaida Patel MD

## 2020-02-19 DIAGNOSIS — I10 HYPERTENSION, UNSPECIFIED TYPE: ICD-10-CM

## 2020-02-19 RX ORDER — LISINOPRIL 40 MG/1
TABLET ORAL
Qty: 90 TABLET | Refills: 3 | Status: SHIPPED | OUTPATIENT
Start: 2020-02-19 | End: 2020-12-10

## 2020-05-27 DIAGNOSIS — E11.9 TYPE 2 DIABETES MELLITUS WITHOUT COMPLICATION, WITHOUT LONG-TERM CURRENT USE OF INSULIN (HCC): Primary | ICD-10-CM

## 2020-06-03 DIAGNOSIS — I10 ESSENTIAL HYPERTENSION: ICD-10-CM

## 2020-06-03 DIAGNOSIS — E03.9 HYPOTHYROIDISM, UNSPECIFIED TYPE: ICD-10-CM

## 2020-06-03 RX ORDER — LEVOTHYROXINE SODIUM 0.07 MG/1
TABLET ORAL
Qty: 90 TABLET | Refills: 3 | Status: SHIPPED | OUTPATIENT
Start: 2020-06-03 | End: 2021-07-06

## 2020-06-03 RX ORDER — NIFEDIPINE 60 MG/1
TABLET, FILM COATED, EXTENDED RELEASE ORAL
Qty: 90 TABLET | Refills: 3 | Status: SHIPPED | OUTPATIENT
Start: 2020-06-03 | End: 2021-05-19

## 2020-06-15 DIAGNOSIS — E11.9 TYPE 2 DIABETES MELLITUS WITHOUT COMPLICATION, UNSPECIFIED WHETHER LONG TERM INSULIN USE (HCC): ICD-10-CM

## 2020-06-15 RX ORDER — GLYBURIDE 3 MG/1
TABLET ORAL
Qty: 360 TABLET | Refills: 6 | Status: SHIPPED | OUTPATIENT
Start: 2020-06-15 | End: 2021-06-23 | Stop reason: SDUPTHER

## 2020-06-17 ENCOUNTER — OFFICE VISIT (OUTPATIENT)
Dept: FAMILY MEDICINE CLINIC | Facility: CLINIC | Age: 85
End: 2020-06-17
Payer: MEDICARE

## 2020-06-17 VITALS
SYSTOLIC BLOOD PRESSURE: 130 MMHG | HEART RATE: 100 BPM | DIASTOLIC BLOOD PRESSURE: 80 MMHG | BODY MASS INDEX: 27.3 KG/M2 | TEMPERATURE: 97.5 F | WEIGHT: 135.4 LBS | HEIGHT: 59 IN | OXYGEN SATURATION: 100 %

## 2020-06-17 DIAGNOSIS — R80.9 MICROALBUMINURIA: ICD-10-CM

## 2020-06-17 DIAGNOSIS — F41.9 ANXIETY: ICD-10-CM

## 2020-06-17 DIAGNOSIS — E03.9 HYPOTHYROIDISM, UNSPECIFIED TYPE: ICD-10-CM

## 2020-06-17 DIAGNOSIS — E11.00 TYPE 2 DIABETES MELLITUS WITH HYPEROSMOLARITY WITHOUT COMA, WITHOUT LONG-TERM CURRENT USE OF INSULIN (HCC): ICD-10-CM

## 2020-06-17 DIAGNOSIS — I10 ESSENTIAL HYPERTENSION: ICD-10-CM

## 2020-06-17 DIAGNOSIS — E78.2 MIXED HYPERLIPIDEMIA: ICD-10-CM

## 2020-06-17 LAB
ALBUMIN SERPL BCP-MCNC: 4.2 G/DL (ref 3.5–5)
ALP SERPL-CCNC: 96 U/L (ref 46–116)
ALT SERPL W P-5'-P-CCNC: 15 U/L (ref 12–78)
ANION GAP SERPL CALCULATED.3IONS-SCNC: 7 MMOL/L (ref 4–13)
AST SERPL W P-5'-P-CCNC: 16 U/L (ref 5–45)
BASOPHILS # BLD AUTO: 0.06 THOUSANDS/ΜL (ref 0–0.1)
BASOPHILS NFR BLD AUTO: 1 % (ref 0–1)
BILIRUB SERPL-MCNC: 0.39 MG/DL (ref 0.2–1)
BUN SERPL-MCNC: 19 MG/DL (ref 5–25)
CALCIUM SERPL-MCNC: 9.1 MG/DL (ref 8.3–10.1)
CHLORIDE SERPL-SCNC: 110 MMOL/L (ref 100–108)
CO2 SERPL-SCNC: 21 MMOL/L (ref 21–32)
CREAT SERPL-MCNC: 1.26 MG/DL (ref 0.6–1.3)
EOSINOPHIL # BLD AUTO: 0.04 THOUSAND/ΜL (ref 0–0.61)
EOSINOPHIL NFR BLD AUTO: 0 % (ref 0–6)
ERYTHROCYTE [DISTWIDTH] IN BLOOD BY AUTOMATED COUNT: 12.9 % (ref 11.6–15.1)
EST. AVERAGE GLUCOSE BLD GHB EST-MCNC: 197 MG/DL
GFR SERPL CREATININE-BSD FRML MDRD: 38 ML/MIN/1.73SQ M
GLUCOSE SERPL-MCNC: 160 MG/DL (ref 65–140)
HBA1C MFR BLD: 8.5 %
HCT VFR BLD AUTO: 39.2 % (ref 34.8–46.1)
HGB BLD-MCNC: 11.8 G/DL (ref 11.5–15.4)
IMM GRANULOCYTES # BLD AUTO: 0.02 THOUSAND/UL (ref 0–0.2)
IMM GRANULOCYTES NFR BLD AUTO: 0 % (ref 0–2)
LYMPHOCYTES # BLD AUTO: 3.01 THOUSANDS/ΜL (ref 0.6–4.47)
LYMPHOCYTES NFR BLD AUTO: 34 % (ref 14–44)
MCH RBC QN AUTO: 30.1 PG (ref 26.8–34.3)
MCHC RBC AUTO-ENTMCNC: 30.1 G/DL (ref 31.4–37.4)
MCV RBC AUTO: 100 FL (ref 82–98)
MONOCYTES # BLD AUTO: 0.49 THOUSAND/ΜL (ref 0.17–1.22)
MONOCYTES NFR BLD AUTO: 6 % (ref 4–12)
NEUTROPHILS # BLD AUTO: 5.37 THOUSANDS/ΜL (ref 1.85–7.62)
NEUTS SEG NFR BLD AUTO: 59 % (ref 43–75)
NRBC BLD AUTO-RTO: 0 /100 WBCS
PLATELET # BLD AUTO: 182 THOUSANDS/UL (ref 149–390)
PMV BLD AUTO: 11.8 FL (ref 8.9–12.7)
POTASSIUM SERPL-SCNC: 4.5 MMOL/L (ref 3.5–5.3)
PROT SERPL-MCNC: 7.8 G/DL (ref 6.4–8.2)
RBC # BLD AUTO: 3.92 MILLION/UL (ref 3.81–5.12)
SODIUM SERPL-SCNC: 138 MMOL/L (ref 136–145)
TSH SERPL DL<=0.05 MIU/L-ACNC: 1.25 UIU/ML (ref 0.36–3.74)
WBC # BLD AUTO: 8.99 THOUSAND/UL (ref 4.31–10.16)

## 2020-06-17 PROCEDURE — 85025 COMPLETE CBC W/AUTO DIFF WBC: CPT | Performed by: FAMILY MEDICINE

## 2020-06-17 PROCEDURE — 3079F DIAST BP 80-89 MM HG: CPT | Performed by: FAMILY MEDICINE

## 2020-06-17 PROCEDURE — 83036 HEMOGLOBIN GLYCOSYLATED A1C: CPT | Performed by: FAMILY MEDICINE

## 2020-06-17 PROCEDURE — 3008F BODY MASS INDEX DOCD: CPT | Performed by: FAMILY MEDICINE

## 2020-06-17 PROCEDURE — 3066F NEPHROPATHY DOC TX: CPT | Performed by: FAMILY MEDICINE

## 2020-06-17 PROCEDURE — 80053 COMPREHEN METABOLIC PANEL: CPT | Performed by: FAMILY MEDICINE

## 2020-06-17 PROCEDURE — 84443 ASSAY THYROID STIM HORMONE: CPT | Performed by: FAMILY MEDICINE

## 2020-06-17 PROCEDURE — 1160F RVW MEDS BY RX/DR IN RCRD: CPT | Performed by: FAMILY MEDICINE

## 2020-06-17 PROCEDURE — 3075F SYST BP GE 130 - 139MM HG: CPT | Performed by: FAMILY MEDICINE

## 2020-06-17 PROCEDURE — 1036F TOBACCO NON-USER: CPT | Performed by: FAMILY MEDICINE

## 2020-06-17 PROCEDURE — 36415 COLL VENOUS BLD VENIPUNCTURE: CPT | Performed by: FAMILY MEDICINE

## 2020-06-17 PROCEDURE — 99214 OFFICE O/P EST MOD 30 MIN: CPT | Performed by: FAMILY MEDICINE

## 2020-06-17 PROCEDURE — 4040F PNEUMOC VAC/ADMIN/RCVD: CPT | Performed by: FAMILY MEDICINE

## 2020-08-01 DIAGNOSIS — E78.5 HYPERLIPIDEMIA, UNSPECIFIED HYPERLIPIDEMIA TYPE: ICD-10-CM

## 2020-08-03 RX ORDER — LOVASTATIN 40 MG/1
TABLET ORAL
Qty: 90 TABLET | Refills: 0 | Status: SHIPPED | OUTPATIENT
Start: 2020-08-03 | End: 2020-10-15

## 2020-09-09 LAB
LEFT EYE DIABETIC RETINOPATHY: NORMAL
RIGHT EYE DIABETIC RETINOPATHY: NORMAL

## 2020-10-02 DIAGNOSIS — E11.9 TYPE 2 DIABETES MELLITUS WITHOUT COMPLICATION, UNSPECIFIED WHETHER LONG TERM INSULIN USE (HCC): ICD-10-CM

## 2020-10-14 DIAGNOSIS — E78.5 HYPERLIPIDEMIA, UNSPECIFIED HYPERLIPIDEMIA TYPE: ICD-10-CM

## 2020-10-15 RX ORDER — LOVASTATIN 40 MG/1
TABLET ORAL
Qty: 90 TABLET | Refills: 0 | Status: SHIPPED | OUTPATIENT
Start: 2020-10-15 | End: 2021-01-21

## 2020-11-23 ENCOUNTER — HOSPITAL ENCOUNTER (EMERGENCY)
Facility: HOSPITAL | Age: 85
Discharge: HOME | End: 2020-11-23
Attending: EMERGENCY MEDICINE
Payer: MEDICARE

## 2020-11-23 ENCOUNTER — APPOINTMENT (EMERGENCY)
Dept: CT IMAGING | Facility: HOSPITAL | Age: 85
End: 2020-11-23
Payer: MEDICARE

## 2020-11-23 ENCOUNTER — TELEPHONE (OUTPATIENT)
Dept: FAMILY MEDICINE CLINIC | Facility: CLINIC | Age: 85
End: 2020-11-23

## 2020-11-23 VITALS
RESPIRATION RATE: 16 BRPM | SYSTOLIC BLOOD PRESSURE: 174 MMHG | OXYGEN SATURATION: 97 % | TEMPERATURE: 97.7 F | HEART RATE: 94 BPM | DIASTOLIC BLOOD PRESSURE: 75 MMHG

## 2020-11-23 DIAGNOSIS — R42 DIZZINESS: Primary | ICD-10-CM

## 2020-11-23 DIAGNOSIS — R11.0 NAUSEA: ICD-10-CM

## 2020-11-23 LAB
ALBUMIN SERPL BCP-MCNC: 4.5 G/DL (ref 3.5–5)
ALP SERPL-CCNC: 95 U/L (ref 46–116)
ALT SERPL W P-5'-P-CCNC: 16 U/L (ref 12–78)
ANION GAP SERPL CALCULATED.3IONS-SCNC: 13 MMOL/L (ref 4–13)
AST SERPL W P-5'-P-CCNC: 14 U/L (ref 5–45)
ATRIAL RATE: 99 BPM
BASOPHILS # BLD AUTO: 0.05 THOUSANDS/ΜL (ref 0–0.1)
BASOPHILS NFR BLD AUTO: 1 % (ref 0–1)
BILIRUB SERPL-MCNC: 0.39 MG/DL (ref 0.2–1)
BUN SERPL-MCNC: 19 MG/DL (ref 5–25)
CALCIUM SERPL-MCNC: 9.5 MG/DL (ref 8.3–10.1)
CHLORIDE SERPL-SCNC: 103 MMOL/L (ref 100–108)
CO2 SERPL-SCNC: 24 MMOL/L (ref 21–32)
CREAT SERPL-MCNC: 1.38 MG/DL (ref 0.6–1.3)
EOSINOPHIL # BLD AUTO: 0 THOUSAND/ΜL (ref 0–0.61)
EOSINOPHIL NFR BLD AUTO: 0 % (ref 0–6)
ERYTHROCYTE [DISTWIDTH] IN BLOOD BY AUTOMATED COUNT: 12.3 % (ref 11.6–15.1)
GFR SERPL CREATININE-BSD FRML MDRD: 34 ML/MIN/1.73SQ M
GLUCOSE SERPL-MCNC: 234 MG/DL (ref 65–140)
HCT VFR BLD AUTO: 40.8 % (ref 34.8–46.1)
HGB BLD-MCNC: 12.9 G/DL (ref 11.5–15.4)
IMM GRANULOCYTES # BLD AUTO: 0.03 THOUSAND/UL (ref 0–0.2)
IMM GRANULOCYTES NFR BLD AUTO: 0 % (ref 0–2)
LYMPHOCYTES # BLD AUTO: 1.73 THOUSANDS/ΜL (ref 0.6–4.47)
LYMPHOCYTES NFR BLD AUTO: 16 % (ref 14–44)
MCH RBC QN AUTO: 30.5 PG (ref 26.8–34.3)
MCHC RBC AUTO-ENTMCNC: 31.6 G/DL (ref 31.4–37.4)
MCV RBC AUTO: 97 FL (ref 82–98)
MONOCYTES # BLD AUTO: 0.23 THOUSAND/ΜL (ref 0.17–1.22)
MONOCYTES NFR BLD AUTO: 2 % (ref 4–12)
NEUTROPHILS # BLD AUTO: 8.72 THOUSANDS/ΜL (ref 1.85–7.62)
NEUTS SEG NFR BLD AUTO: 81 % (ref 43–75)
NRBC BLD AUTO-RTO: 0 /100 WBCS
P AXIS: 90 DEGREES
PLATELET # BLD AUTO: 231 THOUSANDS/UL (ref 149–390)
PMV BLD AUTO: 10.4 FL (ref 8.9–12.7)
POTASSIUM SERPL-SCNC: 4.5 MMOL/L (ref 3.5–5.3)
PR INTERVAL: 140 MS
PROT SERPL-MCNC: 8.8 G/DL (ref 6.4–8.2)
QRS AXIS: 79 DEGREES
QRSD INTERVAL: 94 MS
QT INTERVAL: 364 MS
QTC INTERVAL: 467 MS
RBC # BLD AUTO: 4.23 MILLION/UL (ref 3.81–5.12)
SODIUM SERPL-SCNC: 140 MMOL/L (ref 136–145)
T WAVE AXIS: 19 DEGREES
TROPONIN I SERPL-MCNC: 0.02 NG/ML
VENTRICULAR RATE: 99 BPM
WBC # BLD AUTO: 10.76 THOUSAND/UL (ref 4.31–10.16)

## 2020-11-23 PROCEDURE — 99285 EMERGENCY DEPT VISIT HI MDM: CPT | Performed by: EMERGENCY MEDICINE

## 2020-11-23 PROCEDURE — 70450 CT HEAD/BRAIN W/O DYE: CPT

## 2020-11-23 PROCEDURE — 85025 COMPLETE CBC W/AUTO DIFF WBC: CPT | Performed by: EMERGENCY MEDICINE

## 2020-11-23 PROCEDURE — 93005 ELECTROCARDIOGRAM TRACING: CPT

## 2020-11-23 PROCEDURE — 36415 COLL VENOUS BLD VENIPUNCTURE: CPT | Performed by: EMERGENCY MEDICINE

## 2020-11-23 PROCEDURE — 84484 ASSAY OF TROPONIN QUANT: CPT | Performed by: EMERGENCY MEDICINE

## 2020-11-23 PROCEDURE — 80053 COMPREHEN METABOLIC PANEL: CPT | Performed by: EMERGENCY MEDICINE

## 2020-11-23 PROCEDURE — 96374 THER/PROPH/DIAG INJ IV PUSH: CPT

## 2020-11-23 PROCEDURE — 99284 EMERGENCY DEPT VISIT MOD MDM: CPT

## 2020-11-23 PROCEDURE — 93010 ELECTROCARDIOGRAM REPORT: CPT | Performed by: INTERNAL MEDICINE

## 2020-11-23 PROCEDURE — 96361 HYDRATE IV INFUSION ADD-ON: CPT

## 2020-11-23 RX ORDER — ONDANSETRON 4 MG/1
4 TABLET, ORALLY DISINTEGRATING ORAL EVERY 8 HOURS PRN
Qty: 12 TABLET | Refills: 0 | Status: SHIPPED | OUTPATIENT
Start: 2020-11-23 | End: 2021-03-29 | Stop reason: SDUPTHER

## 2020-11-23 RX ORDER — MECLIZINE HYDROCHLORIDE 25 MG/1
25 TABLET ORAL 3 TIMES DAILY PRN
Qty: 30 TABLET | Refills: 0 | Status: SHIPPED | OUTPATIENT
Start: 2020-11-23 | End: 2020-12-21 | Stop reason: SDUPTHER

## 2020-11-23 RX ORDER — ONDANSETRON 2 MG/ML
4 INJECTION INTRAMUSCULAR; INTRAVENOUS ONCE
Status: COMPLETED | OUTPATIENT
Start: 2020-11-23 | End: 2020-11-23

## 2020-11-23 RX ORDER — MECLIZINE HCL 12.5 MG/1
25 TABLET ORAL ONCE
Status: COMPLETED | OUTPATIENT
Start: 2020-11-23 | End: 2020-11-23

## 2020-11-23 RX ADMIN — MECLIZINE 25 MG: 12.5 TABLET ORAL at 15:52

## 2020-11-23 RX ADMIN — ONDANSETRON 4 MG: 2 INJECTION INTRAMUSCULAR; INTRAVENOUS at 15:53

## 2020-11-23 RX ADMIN — SODIUM CHLORIDE 1000 ML: 0.9 INJECTION, SOLUTION INTRAVENOUS at 15:52

## 2020-12-10 DIAGNOSIS — I10 HYPERTENSION, UNSPECIFIED TYPE: ICD-10-CM

## 2020-12-10 RX ORDER — LISINOPRIL 40 MG/1
TABLET ORAL
Qty: 90 TABLET | Refills: 3 | Status: SHIPPED | OUTPATIENT
Start: 2020-12-10 | End: 2021-09-15

## 2020-12-21 ENCOUNTER — OFFICE VISIT (OUTPATIENT)
Dept: FAMILY MEDICINE CLINIC | Facility: CLINIC | Age: 85
End: 2020-12-21
Payer: MEDICARE

## 2020-12-21 VITALS
SYSTOLIC BLOOD PRESSURE: 120 MMHG | TEMPERATURE: 98.4 F | RESPIRATION RATE: 20 BRPM | WEIGHT: 127.9 LBS | HEART RATE: 100 BPM | BODY MASS INDEX: 26.85 KG/M2 | HEIGHT: 58 IN | DIASTOLIC BLOOD PRESSURE: 80 MMHG | OXYGEN SATURATION: 97 %

## 2020-12-21 DIAGNOSIS — Z23 ENCOUNTER FOR IMMUNIZATION: Primary | ICD-10-CM

## 2020-12-21 DIAGNOSIS — R42 DIZZINESS: ICD-10-CM

## 2020-12-21 DIAGNOSIS — E11.00 TYPE 2 DIABETES MELLITUS WITH HYPEROSMOLARITY WITHOUT COMA, WITHOUT LONG-TERM CURRENT USE OF INSULIN (HCC): ICD-10-CM

## 2020-12-21 DIAGNOSIS — I10 ESSENTIAL HYPERTENSION: ICD-10-CM

## 2020-12-21 DIAGNOSIS — F41.9 ANXIETY: ICD-10-CM

## 2020-12-21 DIAGNOSIS — R42 VERTIGO: Chronic | ICD-10-CM

## 2020-12-21 DIAGNOSIS — E78.2 MIXED HYPERLIPIDEMIA: ICD-10-CM

## 2020-12-21 DIAGNOSIS — M19.90 OSTEOARTHRITIS, UNSPECIFIED OSTEOARTHRITIS TYPE, UNSPECIFIED SITE: ICD-10-CM

## 2020-12-21 DIAGNOSIS — E03.9 HYPOTHYROIDISM, UNSPECIFIED TYPE: ICD-10-CM

## 2020-12-21 LAB
ERYTHROCYTE [SEDIMENTATION RATE] IN BLOOD: 39 MM/HOUR (ref 0–29)
EST. AVERAGE GLUCOSE BLD GHB EST-MCNC: 203 MG/DL
HBA1C MFR BLD: 8.7 %
TSH SERPL DL<=0.05 MIU/L-ACNC: 1.63 UIU/ML (ref 0.36–3.74)

## 2020-12-21 PROCEDURE — 83036 HEMOGLOBIN GLYCOSYLATED A1C: CPT | Performed by: FAMILY MEDICINE

## 2020-12-21 PROCEDURE — 99214 OFFICE O/P EST MOD 30 MIN: CPT | Performed by: FAMILY MEDICINE

## 2020-12-21 PROCEDURE — 36415 COLL VENOUS BLD VENIPUNCTURE: CPT | Performed by: FAMILY MEDICINE

## 2020-12-21 PROCEDURE — 84443 ASSAY THYROID STIM HORMONE: CPT | Performed by: FAMILY MEDICINE

## 2020-12-21 PROCEDURE — 85652 RBC SED RATE AUTOMATED: CPT | Performed by: FAMILY MEDICINE

## 2020-12-21 RX ORDER — MECLIZINE HYDROCHLORIDE 25 MG/1
25 TABLET ORAL 3 TIMES DAILY PRN
Qty: 30 TABLET | Refills: 6 | Status: SHIPPED | OUTPATIENT
Start: 2020-12-21

## 2020-12-21 RX ORDER — DIAZEPAM 2 MG/1
TABLET ORAL
Qty: 30 TABLET | Refills: 4 | Status: SHIPPED | OUTPATIENT
Start: 2020-12-21 | End: 2022-01-12 | Stop reason: SDUPTHER

## 2020-12-22 ENCOUNTER — TREATMENT (OUTPATIENT)
Dept: FAMILY MEDICINE CLINIC | Facility: CLINIC | Age: 85
End: 2020-12-22

## 2020-12-22 DIAGNOSIS — G62.9 NEUROPATHY: Primary | ICD-10-CM

## 2020-12-22 DIAGNOSIS — D51.8 OTHER VITAMIN B12 DEFICIENCY ANEMIAS: ICD-10-CM

## 2020-12-22 DIAGNOSIS — R79.9 ABNORMAL FINDING OF BLOOD CHEMISTRY, UNSPECIFIED: ICD-10-CM

## 2020-12-22 PROBLEM — D64.9 ANEMIA: Chronic | Status: ACTIVE | Noted: 2020-12-22

## 2021-01-20 DIAGNOSIS — E78.5 HYPERLIPIDEMIA, UNSPECIFIED HYPERLIPIDEMIA TYPE: ICD-10-CM

## 2021-01-21 RX ORDER — LOVASTATIN 40 MG/1
TABLET ORAL
Qty: 90 TABLET | Refills: 0 | Status: SHIPPED | OUTPATIENT
Start: 2021-01-21 | End: 2021-03-22

## 2021-02-12 DIAGNOSIS — Z23 ENCOUNTER FOR IMMUNIZATION: ICD-10-CM

## 2021-03-08 ENCOUNTER — APPOINTMENT (EMERGENCY)
Dept: CT IMAGING | Facility: HOSPITAL | Age: 86
End: 2021-03-08
Payer: MEDICARE

## 2021-03-08 ENCOUNTER — HOSPITAL ENCOUNTER (EMERGENCY)
Facility: HOSPITAL | Age: 86
Discharge: HOME | End: 2021-03-08
Attending: EMERGENCY MEDICINE | Admitting: EMERGENCY MEDICINE
Payer: MEDICARE

## 2021-03-08 ENCOUNTER — TELEPHONE (OUTPATIENT)
Dept: FAMILY MEDICINE CLINIC | Facility: CLINIC | Age: 86
End: 2021-03-08

## 2021-03-08 VITALS
BODY MASS INDEX: 26.77 KG/M2 | DIASTOLIC BLOOD PRESSURE: 81 MMHG | HEART RATE: 97 BPM | TEMPERATURE: 98.3 F | OXYGEN SATURATION: 97 % | RESPIRATION RATE: 16 BRPM | SYSTOLIC BLOOD PRESSURE: 174 MMHG | WEIGHT: 128.09 LBS
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
## 2021-03-08 DIAGNOSIS — S01.91XA LACERATION OF HEAD: ICD-10-CM

## 2021-03-08 PROCEDURE — 90471 IMMUNIZATION ADMIN: CPT

## 2021-03-08 PROCEDURE — 70450 CT HEAD/BRAIN W/O DYE: CPT

## 2021-03-08 PROCEDURE — 99284 EMERGENCY DEPT VISIT MOD MDM: CPT

## 2021-03-08 PROCEDURE — 99284 EMERGENCY DEPT VISIT MOD MDM: CPT | Performed by: EMERGENCY MEDICINE

## 2021-03-08 PROCEDURE — 90715 TDAP VACCINE 7 YRS/> IM: CPT | Performed by: EMERGENCY MEDICINE

## 2021-03-08 RX ADMIN — TETANUS TOXOID, REDUCED DIPHTHERIA TOXOID AND ACELLULAR PERTUSSIS VACCINE, ADSORBED 0.5 ML: 5; 2.5; 8; 8; 2.5 SUSPENSION INTRAMUSCULAR at 14:25

## 2021-03-08 NOTE — TELEPHONE ENCOUNTER
Patient called to see if she can get appointment with us today  Patient had a fall, patient states she was bleeding from the head  Patient states she hit her head  I suggested patient go to the emergency room to be see  Patient wanted to see Dr Donavan Wilson  I offer another appointment with someone else but once she said she was bleeding I told patient to go to the closest emergency room   ASHLEY

## 2021-03-08 NOTE — ED ATTENDING ATTESTATION
3/8/2021  IShay MD, saw and evaluated the patient  I have discussed the patient with the resident/non-physician practitioner and agree with the resident's/non-physician practitioner's findings, Plan of Care, and MDM as documented in the resident's/non-physician practitioner's note, except where noted  All available labs and Radiology studies were reviewed  I was present for key portions of any procedure(s) performed by the resident/non-physician practitioner and I was immediately available to provide assistance  At this point I agree with the current assessment done in the Emergency Department  I have conducted an independent evaluation of this patient a history and physical is as follows:    ED Course         Critical Care Time  Procedures  Pt  Was vaccuming , tripped and fell over and she hit her head ,causing a small laceration to the R side of her head  No LOC, no headaches, no n/v, pt  Is acting the same  She lives with her daughter and her daughter witnessed the fall  No cp, no sob, no new meds  No symptoms prior to fall  She walks with a cane/walker at home  No blood thinners  1 5cm R parietal laceration, not gaping, not bleeding  PERRL, no cspine tenderness, RRR, CTA b/l, abd  -nontender, ext  - no edema, nontender

## 2021-03-08 NOTE — DISCHARGE INSTRUCTIONS
You were seen today in the Emergency Department for a fall  Your workup included an exam by two physicians and a CT scan of your head  Please follow up with your Primary Care Provider in the next 1-2 days to recheck your symptoms and to follow up on your visit to the Emergency Department today  Please return to the Emergency Department if you have another fall, lightheadedness, fevers, chills, chest pain, shortness of breath, are unable to eat or drink, or have any other symptoms that concern you  Please look this over and let your nurse and/or me know if you have any further questions before you leave

## 2021-03-08 NOTE — ED PROVIDER NOTES
History  Chief Complaint   Patient presents with    Fall     pt reports she fell and hit her head on the corner of a counter, pt reprots no LOC, denies blood thinners, pt reports bleeding controlled, pt unsure of how she fell     Masha Chaidez is an 80y o  year old female with PMHx significant for NIDDM, hypothyroidism, HTN, HLD, hx of carcinoid tumor s/p resection, who presents to the ED today after a fall  Patient had a mechanical fall at home  States that she was vacuuming and thinks that she tripped over something or lost her footing, causing her to fall and hit her head on the ground  Denies any preceding symptoms  Remembers the entire incident  Fall was witnessed not witnessed but daughter was in adjacent room and heard fall  No LOC  No blood thinners  Last fall: one year ago  The patient denies fevers, chills, headaches, lightheadedness or syncope, nausea, vomiting, vision changes, hearing changes, chest pain, shortness of breath, cough, abdominal pain, changes in usual bowel movements, changes with urination, back pain, numbness or tingling, pain anywhere else in body  The patient has no sick contacts, recent travel history, new or changing medications  History provided by:  Medical records and patient   used: No    Fall  Associated symptoms: no abdominal pain, no chest pain, no headaches, no nausea and no vomiting        Prior to Admission Medications   Prescriptions Last Dose Informant Patient Reported? Taking?    Blood Glucose Monitoring Suppl (ONETOUCH VERIO FLEX SYSTEM) w/Device KIT  Self Yes No   Sig: take by Subcutaneous route once   Lancets (ONETOUCH ULTRASOFT) lancets  Self Yes No   Sig: once daily by fingerstick route   NIFEdipine ER (ADALAT CC) 60 MG 24 hr tablet  Self No No   Sig: TAKE 1 TABLET EVERY DAY   diazepam (VALIUM) 2 mg tablet   No No   Sig: Take one tablet every 6 hours as needed   glucose blood test strip  Self Yes No   Sig: tests once daily by fingerstick route   glucose blood test strip  Self No No   Sig: Test by fingerstick route daily   glyBURIDE micronized (GLYNASE) 3 mg tablet  Self No No   Sig: TAKE 1 TABLET FOUR TIMES DAILY   Patient not taking: Reported on 11/23/2020   levothyroxine 75 mcg tablet  Self No No   Sig: TAKE 1 TABLET EVERY DAY   lisinopril (ZESTRIL) 40 mg tablet   No No   Sig: TAKE 1 TABLET EVERY DAY   lovastatin (MEVACOR) 40 MG tablet   No No   Sig: TAKE 1 TABLET EVERY DAY   meclizine (ANTIVERT) 25 mg tablet   No No   Sig: Take 1 tablet (25 mg total) by mouth 3 (three) times a day as needed for dizziness   metFORMIN (GLUCOPHAGE) 1000 MG tablet   No No   Sig: TAKE 1 TABLET EVERY DAY   ondansetron (ZOFRAN-ODT) 4 mg disintegrating tablet   No No   Sig: Take 1 tablet (4 mg total) by mouth every 8 (eight) hours as needed for nausea or vomiting      Facility-Administered Medications: None       Past Medical History:   Diagnosis Date    Abnormal mammogram 2008    Anemia 12/22/2020    Anxiety 5/15/2014    Carcinoid tumor     Diabetes mellitus (HCC)     Disease of thyroid gland     High cholesterol     Hyperlipidemia     Hypertension     Osteoarthritis 7/24/2008    Sciatica 2008       Past Surgical History:   Procedure Laterality Date    APPENDECTOMY      incidental finding at appendectomy    LUMBAR DISC SURGERY      TOTAL ABDOMINAL HYSTERECTOMY W/ BILATERAL SALPINGOOPHORECTOMY      benign    TUMOR REMOVAL      carcinoid tumor surgery       Family History   Problem Relation Age of Onset    Liver cancer Mother     Hyperlipidemia Father     Heart attack Father     Heart disease Maternal Grandfather      I have reviewed and agree with the history as documented      E-Cigarette/Vaping     E-Cigarette/Vaping Substances     Social History     Tobacco Use    Smoking status: Never Smoker    Smokeless tobacco: Never Used   Substance Use Topics    Alcohol use: No    Drug use: No        Review of Systems   Constitutional: Negative for chills, fatigue and fever  HENT: Negative for nosebleeds  Eyes: Negative for visual disturbance  Respiratory: Negative for cough and shortness of breath  Cardiovascular: Negative for chest pain  Gastrointestinal: Negative for abdominal pain, diarrhea, nausea and vomiting  Genitourinary: Negative for difficulty urinating  Musculoskeletal: Negative for arthralgias and myalgias  Skin: Positive for wound  Neurological: Negative for dizziness, syncope, light-headedness and headaches  All other systems reviewed and are negative  Physical Exam  ED Triage Vitals [03/08/21 1343]   Temperature Pulse Respirations Blood Pressure SpO2   98 3 °F (36 8 °C) (!) 116 16 (!) 207/91 96 %      Temp Source Heart Rate Source Patient Position - Orthostatic VS BP Location FiO2 (%)   Oral Monitor Sitting Right arm --      Pain Score       No Pain             Orthostatic Vital Signs  Vitals:    03/08/21 1343 03/08/21 1423 03/08/21 1532   BP: (!) 207/91 (!) 176/74 (!) 174/81   Pulse: (!) 116 104 97   Patient Position - Orthostatic VS: Sitting Sitting Sitting       Physical Exam  Vitals signs and nursing note reviewed  Constitutional:       General: She is not in acute distress  Appearance: She is well-developed  She is not diaphoretic  HENT:      Head: Normocephalic and atraumatic  Comments: appx 1 5 cm linear wound to R parietal region  No active bleeding  Not gaping  Eyes:      General: No scleral icterus  Conjunctiva/sclera: Conjunctivae normal    Neck:      Musculoskeletal: Neck supple  Vascular: No JVD  Trachea: No tracheal deviation  Cardiovascular:      Rate and Rhythm: Normal rate and regular rhythm  Pulmonary:      Effort: Pulmonary effort is normal  No respiratory distress  Breath sounds: Normal breath sounds  Abdominal:      Palpations: Abdomen is soft  Tenderness: There is no abdominal tenderness  There is no guarding     Musculoskeletal:         General: No deformity  Right lower leg: No edema  Left lower leg: No edema  Skin:     General: Skin is warm and dry  Findings: No rash  Neurological:      Mental Status: She is alert  Comments: Moves all extremities with 5/5 strength  Face symmetric  PERRL   Psychiatric:         Behavior: Behavior normal          ED Medications  Medications   tetanus-diphtheria-acellular pertussis (BOOSTRIX) IM injection 0 5 mL (0 5 mL Intramuscular Given 3/8/21 1425)       Diagnostic Studies  Results Reviewed     None                 CT head without contrast   Final Result by Ash Noel DO (03/08 1612)      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  Additional mild chronic microangiopathic change  Small soft tissue contusion in the right extracranial temporoparietal region  Workstation performed: [id number]               Procedures  Procedures      ED Course  ED Course as of Mar 08 1647   Mon Mar 08, 2021   1439 Per chart review, pt HR at ED and PCP visits persistently 90's-110's  Pt not currently symptomatic  Will recheck prior to d/c      1622 Ambulates at baseline with cane at time of d/c  SBIRT 22yo+      Most Recent Value   SBIRT (24 yo +)   In order to provide better care to our patients, we are screening all of our patients for alcohol and drug use  Would it be okay to ask you these screening questions? Yes Filed at: 03/08/2021 1350   Initial Alcohol Screen: US AUDIT-C    1  How often do you have a drink containing alcohol?  0 Filed at: 03/08/2021 1350   2  How many drinks containing alcohol do you have on a typical day you are drinking? 0 Filed at: 03/08/2021 1350   3b  FEMALE Any Age, or MALE 65+: How often do you have 4 or more drinks on one occassion? 0 Filed at: 03/08/2021 1350   Audit-C Score  0 Filed at: 03/08/2021 1350   CLARI: How many times in the past year have you       Used an illegal drug or used a prescription medication for non-medical reasons? Never Filed at: 03/08/2021 1350                [facility]  Number of Diagnoses or Management Options  Diagnosis management comments: Given hx consistent with mechanical fall, will do CT head and likely d/c home  No preceding symptoms including those consistent with presyncope  No LOC  No thinners  Exam unremarkable except for minor head laceration  Lives with daughter and has access to assistive mobility devices (cane, walker)  Amount and/or Complexity of Data Reviewed  Tests in the radiology section of CPT®: ordered and reviewed  Review and summarize past medical records: yes  Discuss the patient with other providers: yes    Risk of Complications, Morbidity, and/or Mortality  Presenting problems: low  Diagnostic procedures: low  Management options: low    Patient Progress  Patient progress: stable      Disposition  Final diagnoses:   Fall, initial encounter   Laceration of head     Time reflects when diagnosis was documented in both MDM as applicable and the Disposition within this note     Time User Action Codes Description Comment    3/8/2021  2:26 PM Clark Urbano Add [[id number]  [id number]] Fall, initial encounter     3/8/2021  2:27 PM Clark Temple [[id number]] Laceration of head       ED Disposition     ED Disposition Condition Date/Time Comment    Discharge Stable Mon Mar 8, 2021  4:17 PM Iraida Spaulding discharge to home/self care  Results of completed tests discussed  Return to ER precautions given, verbal and written, and questions answered satisfactorily                  Follow-up Information     Follow up With Specialties Details Why Contact Moustapha Zambrano MD Family Medicine Call in 1 day To recheck symptoms and follow up on your ER visit Anders Tran [address] [phone] [address]  [phone]            Discharge Medication List as of 3/8/2021  4:17 PM      CONTINUE these medications which have NOT CHANGED    Details   Blood Glucose Monitoring Suppl ([address]) w/Device KIT take by Subcutaneous route once, Historical Med      diazepam (VALIUM) 2 mg tablet Take one tablet every 6 hours as needed, Normal      !! glucose blood test strip tests once daily by fingerstick route, Historical Med      !! glucose blood test strip Test by fingerstick route daily, Normal      glyBURIDE micronized (GLYNASE) 3 mg tablet TAKE 1 TABLET FOUR TIMES DAILY, Normal      Lancets (ONETOUCH ULTRASOFT) lancets once daily by fingerstick route, Historical Med      levothyroxine 75 mcg tablet TAKE 1 TABLET EVERY DAY, Normal      lisinopril (ZESTRIL) 40 mg tablet TAKE 1 TABLET EVERY DAY, Normal      lovastatin (MEVACOR) 40 MG tablet TAKE 1 TABLET EVERY DAY, Normal      meclizine (ANTIVERT) 25 mg tablet Take 1 tablet (25 mg total) by mouth 3 (three) times a day as needed for dizziness, Starting Mon 12/21/2020, Normal      metFORMIN (GLUCOPHAGE) 1000 MG tablet TAKE 1 TABLET EVERY DAY, Normal      NIFEdipine ER (ADALAT CC) 60 MG 24 hr tablet TAKE 1 TABLET EVERY DAY, Normal      ondansetron (ZOFRAN-ODT) 4 mg disintegrating tablet Take 1 tablet (4 mg total) by mouth every 8 (eight) hours as needed for nausea or vomiting, Starting Mon 11/23/2020, Normal       !! - Potential duplicate medications found  Please discuss with provider  No discharge procedures on file  PDMP Review     None           ED Provider  Attending physically available and evaluated Meenakshi Mckinney I managed the patient along with the ED Attending      Electronically Signed by         Ronald Bernal DO  03/08/21 7405

## 2021-03-16 DIAGNOSIS — E11.9 TYPE 2 DIABETES MELLITUS WITHOUT COMPLICATION, WITHOUT LONG-TERM CURRENT USE OF INSULIN (HCC): ICD-10-CM

## 2021-03-17 ENCOUNTER — DOCUMENTATION (OUTPATIENT)
Dept: FAMILY MEDICINE CLINIC | Facility: CLINIC | Age: 86
End: 2021-03-17

## 2021-03-20 DIAGNOSIS — E78.5 HYPERLIPIDEMIA, UNSPECIFIED HYPERLIPIDEMIA TYPE: ICD-10-CM

## 2021-03-22 RX ORDER — LOVASTATIN 40 MG/1
TABLET ORAL
Qty: 90 TABLET | Refills: 0 | Status: SHIPPED | OUTPATIENT
Start: 2021-03-22 | End: 2021-05-24

## 2021-03-29 DIAGNOSIS — R11.0 NAUSEA: ICD-10-CM

## 2021-03-29 RX ORDER — ONDANSETRON 4 MG/1
4 TABLET, ORALLY DISINTEGRATING ORAL EVERY 8 HOURS PRN
Qty: 30 TABLET | Refills: 1 | Status: SHIPPED | OUTPATIENT
Start: 2021-03-29

## 2021-05-19 DIAGNOSIS — I10 ESSENTIAL HYPERTENSION: ICD-10-CM

## 2021-05-19 RX ORDER — NIFEDIPINE 60 MG/1
TABLET, FILM COATED, EXTENDED RELEASE ORAL
Qty: 90 TABLET | Refills: 3 | Status: SHIPPED | OUTPATIENT
Start: 2021-05-19 | End: 2022-03-17

## 2021-05-21 ENCOUNTER — TELEPHONE (OUTPATIENT)
Dept: FAMILY MEDICINE CLINIC | Facility: CLINIC | Age: 86
End: 2021-05-21

## 2021-05-24 DIAGNOSIS — E78.5 HYPERLIPIDEMIA, UNSPECIFIED HYPERLIPIDEMIA TYPE: ICD-10-CM

## 2021-05-24 RX ORDER — LOVASTATIN 40 MG/1
TABLET ORAL
Qty: 90 TABLET | Refills: 0 | Status: SHIPPED | OUTPATIENT
Start: 2021-05-24 | End: 2021-08-02

## 2021-06-23 ENCOUNTER — OFFICE VISIT (OUTPATIENT)
Dept: FAMILY MEDICINE CLINIC | Facility: CLINIC | Age: 86
End: 2021-06-23
Payer: MEDICARE

## 2021-06-23 ENCOUNTER — TELEPHONE (OUTPATIENT)
Dept: ADMINISTRATIVE | Facility: OTHER | Age: 86
End: 2021-06-23

## 2021-06-23 VITALS
HEART RATE: 116 BPM | SYSTOLIC BLOOD PRESSURE: 152 MMHG | BODY MASS INDEX: 27.04 KG/M2 | WEIGHT: 128.8 LBS | DIASTOLIC BLOOD PRESSURE: 70 MMHG | HEIGHT: 58 IN | OXYGEN SATURATION: 96 % | TEMPERATURE: 98.3 F

## 2021-06-23 DIAGNOSIS — E11.9 TYPE 2 DIABETES MELLITUS WITHOUT COMPLICATION, UNSPECIFIED WHETHER LONG TERM INSULIN USE (HCC): ICD-10-CM

## 2021-06-23 DIAGNOSIS — R80.9 MICROALBUMINURIA: ICD-10-CM

## 2021-06-23 DIAGNOSIS — E78.2 MIXED HYPERLIPIDEMIA: ICD-10-CM

## 2021-06-23 DIAGNOSIS — M79.605 PAIN IN BOTH LOWER EXTREMITIES: ICD-10-CM

## 2021-06-23 DIAGNOSIS — E11.00 TYPE 2 DIABETES MELLITUS WITH HYPEROSMOLARITY WITHOUT COMA, WITHOUT LONG-TERM CURRENT USE OF INSULIN (HCC): ICD-10-CM

## 2021-06-23 DIAGNOSIS — M79.604 PAIN IN BOTH LOWER EXTREMITIES: ICD-10-CM

## 2021-06-23 DIAGNOSIS — I10 ESSENTIAL HYPERTENSION: ICD-10-CM

## 2021-06-23 DIAGNOSIS — C44.41 BASAL CELL CARCINOMA (BCC) OF NECK: Chronic | ICD-10-CM

## 2021-06-23 DIAGNOSIS — E03.9 HYPOTHYROIDISM, UNSPECIFIED TYPE: ICD-10-CM

## 2021-06-23 DIAGNOSIS — D64.9 ANEMIA, UNSPECIFIED TYPE: Chronic | ICD-10-CM

## 2021-06-23 LAB
ANION GAP SERPL CALCULATED.3IONS-SCNC: 8 MMOL/L (ref 4–13)
BUN SERPL-MCNC: 28 MG/DL (ref 5–25)
CALCIUM SERPL-MCNC: 9.6 MG/DL (ref 8.3–10.1)
CHLORIDE SERPL-SCNC: 108 MMOL/L (ref 100–108)
CO2 SERPL-SCNC: 24 MMOL/L (ref 21–32)
CREAT SERPL-MCNC: 1.31 MG/DL (ref 0.6–1.3)
CREAT UR-MCNC: 133 MG/DL
EST. AVERAGE GLUCOSE BLD GHB EST-MCNC: 237 MG/DL
GFR SERPL CREATININE-BSD FRML MDRD: 36 ML/MIN/1.73SQ M
GLUCOSE SERPL-MCNC: 166 MG/DL (ref 65–140)
HBA1C MFR BLD: 9.9 %
MICROALBUMIN UR-MCNC: 318 MG/L (ref 0–20)
MICROALBUMIN/CREAT 24H UR: 239 MG/G CREATININE (ref 0–30)
POTASSIUM SERPL-SCNC: 5.3 MMOL/L (ref 3.5–5.3)
SODIUM SERPL-SCNC: 140 MMOL/L (ref 136–145)

## 2021-06-23 PROCEDURE — 80048 BASIC METABOLIC PNL TOTAL CA: CPT | Performed by: FAMILY MEDICINE

## 2021-06-23 PROCEDURE — G0439 PPPS, SUBSEQ VISIT: HCPCS | Performed by: FAMILY MEDICINE

## 2021-06-23 PROCEDURE — 83036 HEMOGLOBIN GLYCOSYLATED A1C: CPT | Performed by: FAMILY MEDICINE

## 2021-06-23 PROCEDURE — 1123F ACP DISCUSS/DSCN MKR DOCD: CPT | Performed by: FAMILY MEDICINE

## 2021-06-23 PROCEDURE — 36415 COLL VENOUS BLD VENIPUNCTURE: CPT | Performed by: FAMILY MEDICINE

## 2021-06-23 PROCEDURE — 82043 UR ALBUMIN QUANTITATIVE: CPT | Performed by: FAMILY MEDICINE

## 2021-06-23 PROCEDURE — 99214 OFFICE O/P EST MOD 30 MIN: CPT | Performed by: FAMILY MEDICINE

## 2021-06-23 PROCEDURE — 82570 ASSAY OF URINE CREATININE: CPT | Performed by: FAMILY MEDICINE

## 2021-06-23 RX ORDER — GLYBURIDE 3 MG/1
3 TABLET ORAL
Qty: 90 TABLET | Refills: 6
Start: 2021-06-23

## 2021-06-23 NOTE — PATIENT INSTRUCTIONS
Medicare Preventive Visit Patient Instructions  Thank you for completing your Welcome to Medicare Visit or Medicare Annual Wellness Visit today  Your next wellness visit will be due in one year (6/24/2022)  The screening/preventive services that you may require over the next 5-10 years are detailed below  Some tests may not apply to you based off risk factors and/or age  Screening tests ordered at today's visit but not completed yet may show as past due  Also, please note that scanned in results may not display below  Preventive Screenings:  Service Recommendations Previous Testing/Comments   Colorectal Cancer Screening  * Colonoscopy    * Fecal Occult Blood Test (FOBT)/Fecal Immunochemical Test (FIT)  * Fecal DNA/Cologuard Test  * Flexible Sigmoidoscopy Age: 54-65 years old   Colonoscopy: every 10 years (may be performed more frequently if at higher risk)  OR  FOBT/FIT: every 1 year  OR  Cologuard: every 3 years  OR  Sigmoidoscopy: every 5 years  Screening may be recommended earlier than age 48 if at higher risk for colorectal cancer  Also, an individualized decision between you and your healthcare provider will decide whether screening between the ages of 74-80 would be appropriate  Colonoscopy: Not on file  FOBT/FIT: Not on file  Cologuard: Not on file  Sigmoidoscopy: Not on file    Screening Not Indicated     Breast Cancer Screening Age: 36 years old  Frequency: every 1-2 years  Not required if history of left and right mastectomy Mammogram: Not on file        Cervical Cancer Screening Between the ages of 21-29, pap smear recommended once every 3 years  Between the ages of 33-67, can perform pap smear with HPV co-testing every 5 years     Recommendations may differ for women with a history of total hysterectomy, cervical cancer, or abnormal pap smears in past  Pap Smear: Not on file    Screening Not Indicated   Hepatitis C Screening Once for adults born between 1945 and 1965  More frequently in patients at high risk for Hepatitis C Hep C Antibody: Not on file        Diabetes Screening 1-2 times per year if you're at risk for diabetes or have pre-diabetes Fasting glucose: No results in last 5 years   A1C: 8 7 %    Screening Not Indicated  History Diabetes   Cholesterol Screening Once every 5 years if you don't have a lipid disorder  May order more often based on risk factors  Lipid panel: Not on file    Screening Not Indicated  History Lipid Disorder     Other Preventive Screenings Covered by Medicare:  1  Abdominal Aortic Aneurysm (AAA) Screening: covered once if your at risk  You're considered to be at risk if you have a family history of AAA  2  Lung Cancer Screening: covers low dose CT scan once per year if you meet all of the following conditions: (1) Age 50-69; (2) No signs or symptoms of lung cancer; (3) Current smoker or have quit smoking within the last 15 years; (4) You have a tobacco smoking history of at least 30 pack years (packs per day multiplied by number of years you smoked); (5) You get a written order from a healthcare provider  3  Glaucoma Screening: covered annually if you're considered high risk: (1) You have diabetes OR (2) Family history of glaucoma OR (3)  aged 48 and older OR (3)  American aged 72 and older  3  Osteoporosis Screening: covered every 2 years if you meet one of the following conditions: (1) You're estrogen deficient and at risk for osteoporosis based off medical history and other findings; (2) Have a vertebral abnormality; (3) On glucocorticoid therapy for more than 3 months; (4) Have primary hyperparathyroidism; (5) On osteoporosis medications and need to assess response to drug therapy  · Last bone density test (DXA Scan): Not on file  5  HIV Screening: covered annually if you're between the age of 12-76  Also covered annually if you are younger than 13 and older than 72 with risk factors for HIV infection   For pregnant patients, it is covered up to 3 times per pregnancy  Immunizations:  Immunization Recommendations   Influenza Vaccine Annual influenza vaccination during flu season is recommended for all persons aged >= 6 months who do not have contraindications   Pneumococcal Vaccine (Prevnar and Pneumovax)  * Prevnar = PCV13  * Pneumovax = PPSV23   Adults 25-60 years old: 1-3 doses may be recommended based on certain risk factors  Adults 72 years old: Prevnar (PCV13) vaccine recommended followed by Pneumovax (PPSV23) vaccine  If already received PPSV23 since turning 65, then PCV13 recommended at least one year after PPSV23 dose  Hepatitis B Vaccine 3 dose series if at intermediate or high risk (ex: diabetes, end stage renal disease, liver disease)   Tetanus (Td) Vaccine - COST NOT COVERED BY MEDICARE PART B Following completion of primary series, a booster dose should be given every 10 years to maintain immunity against tetanus  Td may also be given as tetanus wound prophylaxis  Tdap Vaccine - COST NOT COVERED BY MEDICARE PART B Recommended at least once for all adults  For pregnant patients, recommended with each pregnancy  Shingles Vaccine (Shingrix) - COST NOT COVERED BY MEDICARE PART B  2 shot series recommended in those aged 48 and above     Health Maintenance Due:  There are no preventive care reminders to display for this patient  Immunizations Due:      Topic Date Due    COVID-19 Vaccine (1) Never done     Advance Directives   What are advance directives? Advance directives are legal documents that state your wishes and plans for medical care  These plans are made ahead of time in case you lose your ability to make decisions for yourself  Advance directives can apply to any medical decision, such as the treatments you want, and if you want to donate organs  What are the types of advance directives? There are many types of advance directives, and each state has rules about how to use them   You may choose a combination of any of the following:  · Living will: This is a written record of the treatment you want  You can also choose which treatments you do not want, which to limit, and which to stop at a certain time  This includes surgery, medicine, IV fluid, and tube feedings  · Durable power of  for healthcare [facility]): This is a written record that states who you want to make healthcare choices for you when you are unable to make them for yourself  This person, called a proxy, is usually a family member or a friend  You may choose more than 1 proxy  · Do not resuscitate (DNR) order:  A DNR order is used in case your heart stops beating or you stop breathing  It is a request not to have certain forms of treatment, such as CPR  A DNR order may be included in other types of advance directives  · Medical directive: This covers the care that you want if you are in a coma, near death, or unable to make decisions for yourself  You can list the treatments you want for each condition  Treatment may include pain medicine, surgery, blood transfusions, dialysis, IV or tube feedings, and a ventilator (breathing machine)  · Values history: This document has questions about your views, beliefs, and how you feel and think about life  This information can help others choose the care that you would choose  Why are advance directives important? An advance directive helps you control your care  Although spoken wishes may be used, it is better to have your wishes written down  Spoken wishes can be misunderstood, or not followed  Treatments may be given even if you do not want them  An advance directive may make it easier for your family to make difficult choices about your care  Urinary Incontinence   Urinary incontinence (UI)  is when you lose control of your bladder  UI develops because your bladder cannot store or empty urine properly  The 3 most common types of UI are stress incontinence, urge incontinence, or both    Medicines:   · May be given to help strengthen your bladder control  Report any side effects of medication to your healthcare provider  Do pelvic muscle exercises often:  Your pelvic muscles help you stop urinating  Squeeze these muscles tight for 5 seconds, then relax for 5 seconds  Gradually work up to squeezing for 10 seconds  Do 3 sets of 15 repetitions a day, or as directed  This will help strengthen your pelvic muscles and improve bladder control  Train your bladder:  Go to the bathroom at set times, such as every 2 hours, even if you do not feel the urge to go  You can also try to hold your urine when you feel the urge to go  For example, hold your urine for 5 minutes when you feel the urge to go  As that becomes easier, hold your urine for 10 minutes  Self-care:   · Keep a UI record  Write down how often you leak urine and how much you leak  Make a note of what you were doing when you leaked urine  · Drink liquids as directed  You may need to limit the amount of liquid you drink to help control your urine leakage  Do not drink any liquid right before you go to bed  Limit or do not have drinks that contain caffeine or alcohol  · Prevent constipation  Eat a variety of high-fiber foods  Good examples are high-fiber cereals, beans, vegetables, and whole-grain breads  Walking is the best way to trigger your intestines to have a bowel movement  · Exercise regularly and maintain a healthy weight  Weight loss and exercise will decrease pressure on your bladder and help you control your leakage  · Use a catheter as directed  to help empty your bladder  A catheter is a tiny, plastic tube that is put into your bladder to drain your urine  · Go to behavior therapy as directed  Behavior therapy may be used to help you learn to control your urge to urinate      Weight Management   Why it is important to manage your weight:  Being overweight increases your risk of health conditions such as heart disease, high blood pressure, type 2 diabetes, and certain types of cancer  It can also increase your risk for osteoarthritis, sleep apnea, and other respiratory problems  Aim for a slow, steady weight loss  Even a small amount of weight loss can lower your risk of health problems  How to lose weight safely:  A safe and healthy way to lose weight is to eat fewer calories and get regular exercise  You can lose up about 1 pound a week by decreasing the number of calories you eat by 500 calories each day  Healthy meal plan for weight management:  A healthy meal plan includes a variety of foods, contains fewer calories, and helps you stay healthy  A healthy meal plan includes the following:  · Eat whole-grain foods more often  A healthy meal plan should contain fiber  Fiber is the part of grains, fruits, and vegetables that is not broken down by your body  Whole-grain foods are healthy and provide extra fiber in your diet  Some examples of whole-grain foods are whole-wheat breads and pastas, oatmeal, brown rice, and bulgur  · Eat a variety of vegetables every day  Include dark, leafy greens such as spinach, kale, chip greens, and mustard greens  Eat yellow and orange vegetables such as carrots, sweet potatoes, and winter squash  · Eat a variety of fruits every day  Choose fresh or canned fruit (canned in its own juice or light syrup) instead of juice  Fruit juice has very little or no fiber  · Eat low-fat dairy foods  Drink fat-free (skim) milk or 1% milk  Eat fat-free yogurt and low-fat cottage cheese  Try low-fat cheeses such as mozzarella and other reduced-fat cheeses  · Choose meat and other protein foods that are low in fat  Choose beans or other legumes such as split peas or lentils  Choose fish, skinless poultry (chicken or turkey), or lean cuts of red meat (beef or pork)  Before you cook meat or poultry, cut off any visible fat  · Use less fat and oil  Try baking foods instead of frying them   Add less fat, such as margarine, sour cream, regular salad dressing and mayonnaise to foods  Eat fewer high-fat foods  Some examples of high-fat foods include french fries, doughnuts, ice cream, and cakes  · Eat fewer sweets  Limit foods and drinks that are high in sugar  This includes candy, cookies, regular soda, and sweetened drinks  Exercise:  Exercise at least 30 minutes per day on most days of the week  Some examples of exercise include walking, biking, dancing, and swimming  You can also fit in more physical activity by taking the stairs instead of the elevator or parking farther away from stores  Ask your healthcare provider about the best exercise plan for you  © Copyright [employer] 2018 Information is for End User's use only and may not be sold, redistributed or otherwise used for commercial purposes  All illustrations and images included in CareNotes® are the copyrighted property of [employer] M [employer]  or [employer]      Try 2 tylenol arthritis during the night to help with early morning legs pains  TO AVOID COVID (CORONAVIRUS) INFECTION THE FOLLOWING ARE HELPFUL:    1  Avoid areas where there are a lot of people  Examples would be small restaurants and churches, small grocery stores etc   Keep 6 feet between you  2  If you must go indoors around people use a mask  Double masking has recently been shown to be more effective  When around people use a hand  after touching surfaces  Important areas are grocery stores, gas pumps, pharmacies, [place], etc  Remember: masks protection is not perfect: They are not a substitute for staying 6 feet away  3  KEEP YOUR HANDS AWAY FROM YOUR FACE  3  If you wish to work in the yard or walk around the neighborhood or in a park and you are not around a lot of people it is okay to keep your mask in your pocket  Casually passing someone without a mask does not put you at risk    4  Try to avoid having people coming in and out of your home unless you and they are fully immunized  This would include cleaning personnel delivery people unnecessary service people etc   after anyone comes into your home including family be careful to wipe all surfaces with a home   I strongly recommend that you consider getting the new vaccine when available  Current information indicates that it is quite safe and extremely effective  The risk of dying from [place] after being fully immunized is less that one in a million  We do not know how long immunity will last at this point  The availability of this vaccine will likely be through pharmacies or community organizations  Because of the storage requirements we will not have the vaccine in our office  Continue to watch the news or newspaper for details about vaccine administration  Currently all adults 25 and older are eligible for the vaccine  Once you are fully immunized the risk of getting COVID is less than 8 in 100,000  Call [address] to register for the vaccine : [phone] : option 7 ;     Medicare Preventive Visit Patient Instructions  Thank you for completing your Welcome to Medicare Visit or Medicare Annual Wellness Visit today  Your next wellness visit will be due in one year (6/24/2022)  The screening/preventive services that you may require over the next 5-10 years are detailed below  Some tests may not apply to you based off risk factors and/or age  Screening tests ordered at today's visit but not completed yet may show as past due  Also, please note that scanned in results may not display below    Preventive Screenings:  Service Recommendations Previous Testing/Comments   Colorectal Cancer Screening  * Colonoscopy    * Fecal Occult Blood Test (FOBT)/Fecal Immunochemical Test (FIT)  * Fecal DNA/Cologuard Test  * Flexible Sigmoidoscopy Age: 54-65 years old   Colonoscopy: every 10 years (may be performed more frequently if at higher risk)  OR  FOBT/FIT: every 1 year  OR  Cologuard: every 3 years  OR  Sigmoidoscopy: every 5 years  Screening may be recommended earlier than age 48 if at higher risk for colorectal cancer  Also, an individualized decision between you and your healthcare provider will decide whether screening between the ages of 74-80 would be appropriate  Colonoscopy: Not on file  FOBT/FIT: Not on file  Cologuard: Not on file  Sigmoidoscopy: Not on file    Screening Not Indicated  Screening Not Indicated     Breast Cancer Screening Age: 36 years old  Frequency: every 1-2 years  Not required if history of left and right mastectomy Mammogram: Not on file    Screening Not Indicated   Cervical Cancer Screening Between the ages of 21-29, pap smear recommended once every 3 years  Between the ages of 33-67, can perform pap smear with HPV co-testing every 5 years  Recommendations may differ for women with a history of total hysterectomy, cervical cancer, or abnormal pap smears in past  Pap Smear: Not on file    Screening Not Indicated  Screening Not Indicated   Hepatitis C Screening Once for adults born between 1945 and 1965  More frequently in patients at high risk for Hepatitis C Hep C Antibody: Not on file    Screening Not Indicated   Diabetes Screening 1-2 times per year if you're at risk for diabetes or have pre-diabetes Fasting glucose: No results in last 5 years   A1C: 8 7 %    Screening Not Indicated  History Diabetes  Screening Not Indicated  History Diabetes   Cholesterol Screening Once every 5 years if you don't have a lipid disorder  May order more often based on risk factors  Lipid panel: Not on file    Screening Not Indicated  History Lipid Disorder  Screening Not Indicated  History Lipid Disorder     Other Preventive Screenings Covered by Medicare:  6  Abdominal Aortic Aneurysm (AAA) Screening: covered once if your at risk  You're considered to be at risk if you have a family history of AAA    7  Lung Cancer Screening: covers low dose CT scan once per year if you meet all of the following conditions: (1) Age 50-69; (2) No signs or symptoms of lung cancer; (3) Current smoker or have quit smoking within the last 15 years; (4) You have a tobacco smoking history of at least 30 pack years (packs per day multiplied by number of years you smoked); (5) You get a written order from a healthcare provider  8  Glaucoma Screening: covered annually if you're considered high risk: (1) You have diabetes OR (2) Family history of glaucoma OR (3)  aged 48 and older OR (3)  American aged 72 and older  5  Osteoporosis Screening: covered every 2 years if you meet one of the following conditions: (1) You're estrogen deficient and at risk for osteoporosis based off medical history and other findings; (2) Have a vertebral abnormality; (3) On glucocorticoid therapy for more than 3 months; (4) Have primary hyperparathyroidism; (5) On osteoporosis medications and need to assess response to drug therapy  · Last bone density test (DXA Scan): Not on file  10  HIV Screening: covered annually if you're between the age of 12-76  Also covered annually if you are younger than 13 and older than 72 with risk factors for HIV infection  For pregnant patients, it is covered up to 3 times per pregnancy  Immunizations:  Immunization Recommendations   Influenza Vaccine Annual influenza vaccination during flu season is recommended for all persons aged >= 6 months who do not have contraindications   Pneumococcal Vaccine (Prevnar and Pneumovax)  * Prevnar = PCV13  * Pneumovax = PPSV23   Adults 25-60 years old: 1-3 doses may be recommended based on certain risk factors  Adults 72 years old: Prevnar (PCV13) vaccine recommended followed by Pneumovax (PPSV23) vaccine  If already received PPSV23 since turning 65, then PCV13 recommended at least one year after PPSV23 dose     Hepatitis B Vaccine 3 dose series if at intermediate or high risk (ex: diabetes, end stage renal disease, liver disease) Tetanus (Td) Vaccine - COST NOT COVERED BY MEDICARE PART B Following completion of primary series, a booster dose should be given every 10 years to maintain immunity against tetanus  Td may also be given as tetanus wound prophylaxis  Tdap Vaccine - COST NOT COVERED BY MEDICARE PART B Recommended at least once for all adults  For pregnant patients, recommended with each pregnancy  Shingles Vaccine (Shingrix) - COST NOT COVERED BY MEDICARE PART B  2 shot series recommended in those aged 48 and above     Health Maintenance Due:  There are no preventive care reminders to display for this patient  Immunizations Due:      Topic Date Due    COVID-19 Vaccine (1) Never done     Advance Directives   What are advance directives? Advance directives are legal documents that state your wishes and plans for medical care  These plans are made ahead of time in case you lose your ability to make decisions for yourself  Advance directives can apply to any medical decision, such as the treatments you want, and if you want to donate organs  What are the types of advance directives? There are many types of advance directives, and each state has rules about how to use them  You may choose a combination of any of the following:  · Living will: This is a written record of the treatment you want  You can also choose which treatments you do not want, which to limit, and which to stop at a certain time  This includes surgery, medicine, IV fluid, and tube feedings  · Durable power of  for healthcare [facility]): This is a written record that states who you want to make healthcare choices for you when you are unable to make them for yourself  This person, called a proxy, is usually a family member or a friend  You may choose more than 1 proxy  · Do not resuscitate (DNR) order:  A DNR order is used in case your heart stops beating or you stop breathing  It is a request not to have certain forms of treatment, such as CPR   A DNR order may be included in other types of advance directives  · Medical directive: This covers the care that you want if you are in a coma, near death, or unable to make decisions for yourself  You can list the treatments you want for each condition  Treatment may include pain medicine, surgery, blood transfusions, dialysis, IV or tube feedings, and a ventilator (breathing machine)  · Values history: This document has questions about your views, beliefs, and how you feel and think about life  This information can help others choose the care that you would choose  Why are advance directives important? An advance directive helps you control your care  Although spoken wishes may be used, it is better to have your wishes written down  Spoken wishes can be misunderstood, or not followed  Treatments may be given even if you do not want them  An advance directive may make it easier for your family to make difficult choices about your care  Urinary Incontinence   Urinary incontinence (UI)  is when you lose control of your bladder  UI develops because your bladder cannot store or empty urine properly  The 3 most common types of UI are stress incontinence, urge incontinence, or both  Medicines:   · May be given to help strengthen your bladder control  Report any side effects of medication to your healthcare provider  Do pelvic muscle exercises often:  Your pelvic muscles help you stop urinating  Squeeze these muscles tight for 5 seconds, then relax for 5 seconds  Gradually work up to squeezing for 10 seconds  Do 3 sets of 15 repetitions a day, or as directed  This will help strengthen your pelvic muscles and improve bladder control  Train your bladder:  Go to the bathroom at set times, such as every 2 hours, even if you do not feel the urge to go  You can also try to hold your urine when you feel the urge to go  For example, hold your urine for 5 minutes when you feel the urge to go   As that becomes easier, hold your urine for 10 minutes  Self-care:   · Keep a UI record  Write down how often you leak urine and how much you leak  Make a note of what you were doing when you leaked urine  · Drink liquids as directed  You may need to limit the amount of liquid you drink to help control your urine leakage  Do not drink any liquid right before you go to bed  Limit or do not have drinks that contain caffeine or alcohol  · Prevent constipation  Eat a variety of high-fiber foods  Good examples are high-fiber cereals, beans, vegetables, and whole-grain breads  Walking is the best way to trigger your intestines to have a bowel movement  · Exercise regularly and maintain a healthy weight  Weight loss and exercise will decrease pressure on your bladder and help you control your leakage  · Use a catheter as directed  to help empty your bladder  A catheter is a tiny, plastic tube that is put into your bladder to drain your urine  · Go to behavior therapy as directed  Behavior therapy may be used to help you learn to control your urge to urinate  Weight Management   Why it is important to manage your weight:  Being overweight increases your risk of health conditions such as heart disease, high blood pressure, type 2 diabetes, and certain types of cancer  It can also increase your risk for osteoarthritis, sleep apnea, and other respiratory problems  Aim for a slow, steady weight loss  Even a small amount of weight loss can lower your risk of health problems  How to lose weight safely:  A safe and healthy way to lose weight is to eat fewer calories and get regular exercise  You can lose up about 1 pound a week by decreasing the number of calories you eat by 500 calories each day  Healthy meal plan for weight management:  A healthy meal plan includes a variety of foods, contains fewer calories, and helps you stay healthy  A healthy meal plan includes the following:  · Eat whole-grain foods more often    A healthy meal plan should contain fiber  Fiber is the part of grains, fruits, and vegetables that is not broken down by your body  Whole-grain foods are healthy and provide extra fiber in your diet  Some examples of whole-grain foods are whole-wheat breads and pastas, oatmeal, brown rice, and bulgur  · Eat a variety of vegetables every day  Include dark, leafy greens such as spinach, kale, chip greens, and mustard greens  Eat yellow and orange vegetables such as carrots, sweet potatoes, and winter squash  · Eat a variety of fruits every day  Choose fresh or canned fruit (canned in its own juice or light syrup) instead of juice  Fruit juice has very little or no fiber  · Eat low-fat dairy foods  Drink fat-free (skim) milk or 1% milk  Eat fat-free yogurt and low-fat cottage cheese  Try low-fat cheeses such as mozzarella and other reduced-fat cheeses  · Choose meat and other protein foods that are low in fat  Choose beans or other legumes such as split peas or lentils  Choose fish, skinless poultry (chicken or turkey), or lean cuts of red meat (beef or pork)  Before you cook meat or poultry, cut off any visible fat  · Use less fat and oil  Try baking foods instead of frying them  Add less fat, such as margarine, sour cream, regular salad dressing and mayonnaise to foods  Eat fewer high-fat foods  Some examples of high-fat foods include french fries, doughnuts, ice cream, and cakes  · Eat fewer sweets  Limit foods and drinks that are high in sugar  This includes candy, cookies, regular soda, and sweetened drinks  Exercise:  Exercise at least 30 minutes per day on most days of the week  Some examples of exercise include walking, biking, dancing, and swimming  You can also fit in more physical activity by taking the stairs instead of the elevator or parking farther away from stores  Ask your healthcare provider about the best exercise plan for you        © Copyright [employer] 2018 Information is for End User's use only and may not be sold, redistributed or otherwise used for commercial purposes   All illustrations and images included in CareNotes® are the copyrighted property of [employer]  or Lynn Porras

## 2021-06-23 NOTE — TELEPHONE ENCOUNTER
Upon review of the In Basket request we were able to locate, review, and update the patient chart as requested for Diabetic Foot Exam     Any additional questions or concerns should be emailed to the Practice Liaisons via [email or web address] email, please do not reply via In Basket      Thank you  Blaze Liu MA   RESULTED DM FOOT EXAM - ON 3/8/21 - EYE EXAM CURRENT

## 2021-06-23 NOTE — PROGRESS NOTES
Assessment and Plan:     Problem List Items Addressed This Visit     None           Preventive health issues were discussed with patient, and age appropriate screening tests were ordered as noted in patient's After Visit Summary  Personalized health advice and appropriate referrals for health education or preventive services given if needed, as noted in patient's After Visit Summary       History of Present Illness:     Patient presents for Medicare Annual Wellness visit    Patient Care Team:  Yoli Sanchez MD as PCP - General (Family Medicine)     Problem List:     Patient Active Problem List   Diagnosis    Anxiety    Osteoarthritis    Type 2 diabetes mellitus ([address] )    Hyperlipidemia    Hypertension    Hypothyroid    Microalbuminuria    Neuropathy    Vertigo    Anemia      Past Medical and Surgical History:     Past Medical History:   Diagnosis Date    Abnormal mammogram 2008    Anemia 12/22/2020    Anxiety 5/15/2014    Carcinoid tumor     Diabetes mellitus ([address] )     Disease of thyroid gland     High cholesterol     Hyperlipidemia     Hypertension     Osteoarthritis 7/24/2008    Sciatica 2008     Past Surgical History:   Procedure Laterality Date    APPENDECTOMY      incidental finding at appendectomy    LUMBAR DISC SURGERY      TOTAL ABDOMINAL HYSTERECTOMY W/ BILATERAL SALPINGOOPHORECTOMY      benign    TUMOR REMOVAL      carcinoid tumor surgery      Family History:     Family History   Problem Relation Age of Onset    Liver cancer Mother     Hyperlipidemia Father     Heart attack Father     Heart disease Maternal Grandfather       Social History:     Social History     Socioeconomic History    Marital status: /Civil Union     Spouse name: None    Number of children: None    Years of education: None    Highest education level: None   Occupational History    Occupation: retired   Tobacco Use    Smoking status: Never Smoker    Smokeless tobacco: Never Used    Tobacco comment: no exposure to passive smoke   Vaping Use    Vaping Use: Never used   Substance and Sexual Activity    Alcohol use: No    Drug use: No    Sexual activity: None   Other Topics Concern    None   Social History Narrative    None     Social Determinants of Health     Financial Resource Strain:     Difficulty of Paying Living Expenses:    Food Insecurity:     Worried About Running Out of Food in the Last Year:     Ran Out of Food in the Last Year:    Transportation Needs:     Lack of Transportation (Medical):      Lack of Transportation (Non-Medical):    Physical Activity:     Days of Exercise per Week:     Minutes of Exercise per Session:    Stress:     Feeling of Stress :    Social Connections:     Frequency of Communication with Friends and Family:     Frequency of Social Gatherings with Friends and Family:     Attends Jain Services:     Active Member of Clubs or Organizations:     Attends Club or Organization Meetings:     Marital Status:    Intimate Partner Violence:     Fear of Current or Ex-Partner:     Emotionally Abused:     Physically Abused:     Sexually Abused:       Medications and Allergies:     Current Outpatient Medications   Medication Sig Dispense Refill    Blood Glucose Monitoring Suppl ([address]) w/Device KIT take by Subcutaneous route once      diazepam (VALIUM) 2 mg tablet Take one tablet every 6 hours as needed 30 tablet 4    glucose blood test strip tests once daily by fingerstick route      glucose blood test strip Test by fingerstick route daily 100 each 3    Lancets (ONETOUCH ULTRASOFT) lancets once daily by fingerstick route      levothyroxine 75 mcg tablet TAKE 1 TABLET EVERY DAY 90 tablet 3    lisinopril (ZESTRIL) 40 mg tablet TAKE 1 TABLET EVERY DAY 90 tablet 3    lovastatin (MEVACOR) 40 MG tablet TAKE 1 TABLET EVERY DAY 90 tablet 0    meclizine (ANTIVERT) 25 mg tablet Take 1 tablet (25 mg total) by mouth 3 (three) times a day as needed for dizziness 30 tablet 6    metFORMIN (GLUCOPHAGE) 1000 MG tablet TAKE 1 TABLET EVERY DAY 90 tablet 3    NIFEdipine ER (ADALAT CC) 60 MG 24 hr tablet TAKE 1 TABLET EVERY DAY 90 tablet 3    ondansetron (ZOFRAN-ODT) 4 mg disintegrating tablet Take 1 tablet (4 mg total) by mouth every 8 (eight) hours as needed for nausea or vomiting 30 tablet 1    glyBURIDE micronized (GLYNASE) 3 mg tablet TAKE 1 TABLET FOUR TIMES DAILY (Patient not taking: Reported on 11/23/2020) 360 tablet 6     No current facility-administered medications for this visit  Allergies   Allergen Reactions    Acetaminophen GI Intolerance    Codeine      Other reaction(s): GI problems    Hydrochlorothiazide      Other reaction(s): HYPONATREMIA    Hydrocodone GI Intolerance    Ibuprofen      Other reaction(s): GI    Oxycodone      Other reaction(s): GI      Immunizations:     Immunization History   Administered Date(s) Administered    H1N1, All Formulations 01/06/2010    INFLUENZA 09/08/2016, 09/30/2018, 09/26/2019    Influenza Split 09/15/2010, 09/03/2013, 09/19/2014, 09/08/2016    Influenza Split High Dose Preservative Free IM 09/30/2018    Influenza, high dose seasonal 0 7 mL 09/04/2020    Influenza, seasonal, injectable 11/23/2009, 09/09/2011, 09/01/2012    Pneumococcal Conjugate 13-Valent 06/08/2015    Pneumococcal Polysaccharide PPV23 12/24/2002    Tdap 03/08/2021    Zoster 11/24/2007    influenza, trivalent, adjuvanted 09/26/2019      Health Maintenance: There are no preventive care reminders to display for this patient  Topic Date Due    COVID-19 Vaccine (1) Never done      Medicare Health Risk Assessment:     /70 (BP Location: Left arm, Patient Position: Sitting, Cuff Size: Standard)   Pulse (!) 116   Temp 98 3 °F (36 8 °C) (Temporal)   Ht 4' 10" (1 473 m)   Wt 58 4 kg (128 lb 12 8 oz)   SpO2 96%   BMI 26 92 kg/m²      Iveth Reyes is here for her Subsequent Wellness visit       Health Risk Assessment:   Patient rates overall health as good  Patient feels that their physical health rating is same  Patient is satisfied with their life  Eyesight was rated as same  Hearing was rated as same  Patient feels that their emotional and mental health rating is same  Patients states they are never, rarely angry  Patient states they are never, rarely unusually tired/fatigued  Pain experienced in the last 7 days has been none  Patient states that she has experienced no weight loss or gain in last 6 months  Depression Screening:   PHQ-2 Score: 0      Fall Risk Screening: In the past year, patient has experienced: no history of falling in past year      Urinary Incontinence Screening:   Patient has not leaked urine accidently in the last six months  Home Safety:  Patient has trouble with stairs inside or outside of their home  Patient has working smoke alarms and has no working carbon monoxide detector  Home safety hazards include: none  Nutrition:   Current diet is Regular  Medications:   Patient is not currently taking any over-the-counter supplements  Patient is able to manage medications  Activities of Daily Living (ADLs)/Instrumental Activities of Daily Living (IADLs):   Walk and transfer into and out of bed and chair?: Yes  Dress and groom yourself?: Yes    Bathe or shower yourself?: Yes    Feed yourself?  Yes  Do your laundry/housekeeping?: Yes  Manage your money, pay your bills and track your expenses?: Yes  Make your own meals?: Yes    Do your own shopping?: Yes    Previous Hospitalizations:   Any hospitalizations or ED visits within the last 12 months?: Yes    How many hospitalizations have you had in the last year?: 1-2    Advance Care Planning:   Living will: Yes    Advanced directive: Yes      PREVENTIVE SCREENINGS      Cardiovascular Screening:    General: Screening Not Indicated and History Lipid Disorder      Diabetes Screening:     General: Screening Not Indicated and History Diabetes      Colorectal Cancer Screening:     General: Screening Not Indicated      Cervical Cancer Screening:    General: Screening Not Indicated      Lung Cancer Screening:     General: Screening Not Indicated    Screening, Brief Intervention, and Referral to Treatment (SBIRT)    Screening  Typical number of drinks in a day: 0    Single Item Drug Screening:  How often have you used an illegal drug (including marijuana) or a prescription medication for non-medical reasons in the past year? never    Single Item Drug Screen Score: 0  Interpretation: Negative screen for possible drug use disorder      Marichuy Dunlap MD BMI Counseling: Body mass index is 26 92 kg/m²  The BMI is above normal  Nutrition recommendations include reducing portion sizes, decreasing overall calorie intake, 3-5 servings of fruits/vegetables daily, reducing fast food intake, consuming healthier snacks, decreasing soda and/or juice intake, moderation in carbohydrate intake, increasing intake of lean protein, reducing intake of saturated fat and trans fat and reducing intake of cholesterol  Exercise recommendations include moderate aerobic physical activity for 150 minutes/week and exercising 3-5 times per week

## 2021-06-23 NOTE — TELEPHONE ENCOUNTER
----- Message from Dee Dee Wolfe sent at 6/23/2021 10:42 AM EDT -----  03/09/21 12:05 PM    Hello, our patient Kevin Iglesias has had Diabetic Eye Exam completed/performed  Please assist in updating the patient chart by pulling the Care Everywhere (CE) document  The date of service is ##3/8/21#       Thank you,  Francine Carlisle MA  [address] [facility] [place]

## 2021-06-23 NOTE — PROGRESS NOTES
Assessment/Plan:    No problem-specific Assessment & Plan notes found for this encounter  Diagnoses and all orders for this visit:    BMI 26 0-26 9,adult    Pain in both lower extremities    Type 2 diabetes mellitus without complication, unspecified whether long term insulin use (HCC)    Type 2 diabetes mellitus with hyperosmolarity without coma, without long-term current use of insulin (HCC)    Hypothyroidism, unspecified type    Essential hypertension    Microalbuminuria    Mixed hyperlipidemia    Anemia, unspecified type    Basal cell carcinoma (BCC) of neck          Subjective:      Patient ID: Candie Amaya is a 80 y o  female  PATIENT RETURNS FOR FOLLOW-UP OF CHRONIC MEDICAL CONDITIONS  NO HOSPITAL STAYS OR EMERGENCY VISITS RECENTLY  MEDS WERE REVIEWED AND NO SIDE EFFECTS  NO NEW ISSUES  UNLESS NOTED BELOW  NO NEW MEDICAL PROVIDER REPORTED  THE CHRONIC DISEASES LISTED ABOVE ARE STABLE AND UNCHANGED/ THE PLAN OF CARE FOR THOSE WILL REMAIN UNCHANGED UNLESS NOTED BELOW  AWV/sub      The following portions of the patient's history were reviewed and updated as appropriate: allergies, current medications, past family history, past medical history, past social history, past surgical history and problem list     Review of Systems   Constitutional: Negative for activity change and appetite change  HENT: Negative for voice change  Eyes: Negative for visual disturbance  Respiratory: Negative for chest tightness and shortness of breath  Cardiovascular: Negative for chest pain, palpitations and leg swelling  Gastrointestinal: Negative for abdominal pain, blood in stool, constipation and diarrhea  Genitourinary: Negative for dysuria, vaginal bleeding and vaginal discharge  Musculoskeletal: Positive for arthralgias  Skin: Negative for rash  Neurological: Negative for dizziness  Psychiatric/Behavioral: Negative for dysphoric mood           Objective:  Vitals:    06/23/21 1024   BP: 152/70   BP Location: Left arm   Patient Position: Sitting   Cuff Size: Standard   Pulse: (!) 116   Temp: 98 3 °F (36 8 °C)   TempSrc: Temporal   SpO2: 96%   Weight: 58 4 kg (128 lb 12 8 oz)   Height: 4' 10" (1 473 m)      Physical Exam  Constitutional:       Appearance: She is well-developed  HENT:      Head: Normocephalic and atraumatic  Eyes:      Conjunctiva/sclera: Conjunctivae normal    Neck:      Thyroid: No thyromegaly  Cardiovascular:      Rate and Rhythm: Normal rate and regular rhythm  Heart sounds: Normal heart sounds  No murmur heard  Pulmonary:      Effort: Pulmonary effort is normal  No respiratory distress  Breath sounds: Normal breath sounds  Musculoskeletal:      Cervical back: Neck supple  Lymphadenopathy:      Cervical: No cervical adenopathy  Skin:     General: Skin is warm and dry  Comments: BCC appearing lesion of neck    Psychiatric:         Behavior: Behavior normal            Patient's chronic problems that were reviewed today are stable  Recent hospital stays reviewed  Recent labs and imaging reviewed  Recent visits to other providers reviewed  Meds reviewed and no changes made  Appropriate labs and imaging were ordered  Preventive measures appropriate for age and sex were reviewed with patient  Immunizations were updated as appropriate  Assessment and Plan:     Problem List Items Addressed This Visit        Endocrine    Type 2 diabetes mellitus ([address] )    Hypothyroid       Cardiovascular and Mediastinum    Hypertension       Musculoskeletal and Integument    Basal cell carcinoma (BCC) of neck (Chronic)       Other    Anemia (Chronic)    Hyperlipidemia    Microalbuminuria      Other Visit Diagnoses     BMI 26 0-26 9,adult    -  Primary    Pain in both lower extremities               Preventive health issues were discussed with patient, and age appropriate screening tests were ordered as noted in patient's After Visit Summary    Personalized health advice and appropriate referrals for health education or preventive services given if needed, as noted in patient's After Visit Summary       History of Present Illness:     Patient presents for Medicare Annual Wellness visit    Patient Care Team:  Diane Gonzalez MD as PCP - General (Family Medicine)     Problem List:     Patient Active Problem List   Diagnosis    Anxiety    Osteoarthritis    Type 2 diabetes mellitus ([facility] [address] )    Hyperlipidemia    Hypertension    Hypothyroid    Microalbuminuria    Neuropathy    Vertigo    Anemia    Basal cell carcinoma (BCC) of neck      Past Medical and Surgical History:     Past Medical History:   Diagnosis Date    Abnormal mammogram 2008    Anemia 12/22/2020    Anxiety 5/15/2014    Basal cell carcinoma (BCC) of neck 6/23/2021    Carcinoid tumor     Diabetes mellitus ([facility] [address] )     Disease of thyroid gland     High cholesterol     Hyperlipidemia     Hypertension     Osteoarthritis 7/24/2008    Sciatica 2008     Past Surgical History:   Procedure Laterality Date    APPENDECTOMY      incidental finding at appendectomy    LUMBAR DISC SURGERY      TOTAL ABDOMINAL HYSTERECTOMY W/ BILATERAL SALPINGOOPHORECTOMY      benign    TUMOR REMOVAL      carcinoid tumor surgery      Family History:     Family History   Problem Relation Age of Onset    Liver cancer Mother     Hyperlipidemia Father     Heart attack Father     Heart disease Maternal Grandfather       Social History:     Social History     Socioeconomic History    Marital status: /Civil Union     Spouse name: None    Number of children: None    Years of education: None    Highest education level: None   Occupational History    Occupation: retired   Tobacco Use    Smoking status: Never Smoker    Smokeless tobacco: Never Used    Tobacco comment: no exposure to passive smoke   Vaping Use    Vaping Use: Never used   Substance and Sexual Activity    Alcohol use: No    Drug use: No    Sexual activity: None Other Topics Concern    None   Social History Narrative    None     Social Determinants of Health     Financial Resource Strain:     Difficulty of Paying Living Expenses:    Food Insecurity:     Worried About Running Out of Food in the Last Year:     [address] in the Last Year:    Transportation Needs:     Lack of Transportation (Medical):      Lack of Transportation (Non-Medical):    Physical Activity:     Days of Exercise per Week:     Minutes of Exercise per Session:    Stress:     Feeling of Stress :    Social Connections:     Frequency of Communication with Friends and Family:     Frequency of Social Gatherings with Friends and Family:     Attends Temple Services:     Active Member of Clubs or Organizations:     Attends Club or Organization Meetings:     Marital Status:    Intimate Partner Violence:     Fear of Current or Ex-Partner:     Emotionally Abused:     Physically Abused:     Sexually Abused:       Medications and Allergies:     Current Outpatient Medications   Medication Sig Dispense Refill    Blood Glucose Monitoring Suppl ([address]) w/Device KIT take by Subcutaneous route once      diazepam (VALIUM) 2 mg tablet Take one tablet every 6 hours as needed 30 tablet 4    glucose blood test strip tests once daily by fingerstick route      glucose blood test strip Test by fingerstick route daily 100 each 3    Lancets (ONETOUCH ULTRASOFT) lancets once daily by fingerstick route      levothyroxine 75 mcg tablet TAKE 1 TABLET EVERY DAY 90 tablet 3    lisinopril (ZESTRIL) 40 mg tablet TAKE 1 TABLET EVERY DAY 90 tablet 3    lovastatin (MEVACOR) 40 MG tablet TAKE 1 TABLET EVERY DAY 90 tablet 0    meclizine (ANTIVERT) 25 mg tablet Take 1 tablet (25 mg total) by mouth 3 (three) times a day as needed for dizziness 30 tablet 6    metFORMIN (GLUCOPHAGE) 1000 MG tablet TAKE 1 TABLET EVERY DAY 90 tablet 3    NIFEdipine ER (ADALAT CC) 60 MG 24 hr tablet TAKE 1 TABLET EVERY DAY 90 tablet 3    ondansetron (ZOFRAN-ODT) 4 mg disintegrating tablet Take 1 tablet (4 mg total) by mouth every 8 (eight) hours as needed for nausea or vomiting 30 tablet 1    glyBURIDE micronized (GLYNASE) 3 mg tablet TAKE 1 TABLET FOUR TIMES DAILY (Patient not taking: Reported on 11/23/2020) 360 tablet 6     No current facility-administered medications for this visit  Allergies   Allergen Reactions    Acetaminophen GI Intolerance    Codeine      Other reaction(s): GI problems    Hydrochlorothiazide      Other reaction(s): HYPONATREMIA    Hydrocodone GI Intolerance    Ibuprofen      Other reaction(s): GI    Oxycodone      Other reaction(s): GI      Immunizations:     Immunization History   Administered Date(s) Administered    H1N1, All Formulations 01/06/2010    INFLUENZA 09/08/2016, 09/30/2018, 09/26/2019    Influenza Split 09/15/2010, 09/03/2013, 09/19/2014, 09/08/2016    Influenza Split High Dose Preservative Free IM 09/30/2018    Influenza, high dose seasonal 0 7 mL 09/04/2020    Influenza, seasonal, injectable 11/23/2009, 09/09/2011, 09/01/2012    Pneumococcal Conjugate 13-Valent 06/08/2015    Pneumococcal Polysaccharide PPV23 12/24/2002    Tdap 03/08/2021    Zoster 11/24/2007    influenza, trivalent, adjuvanted 09/26/2019      Health Maintenance: There are no preventive care reminders to display for this patient  Topic Date Due    COVID-19 Vaccine (1) Never done      Medicare Health Risk Assessment:     /70 (BP Location: Left arm, Patient Position: Sitting, Cuff Size: Standard)   Pulse (!) 116   Temp 98 3 °F (36 8 °C) (Temporal)   Ht 4' 10" (1 473 m)   Wt 58 4 kg (128 lb 12 8 oz)   SpO2 96%   BMI 26 92 kg/m²      Jing Gomez is here for her Subsequent Wellness visit  Last Medicare Wellness visit information reviewed, patient interviewed, no change since last AWV       Depression Screening:   PHQ-2 Score: 0      Previous Hospitalizations:   Any hospitalizations or ED visits within the last 12 months?: No      Advance Care Planning:   Living will: Yes    Durable POA for healthcare: Yes    Advanced directive: Yes      Cognitive Screening:   Provider or family/friend/caregiver concerned regarding cognition?: No    PREVENTIVE SCREENINGS      Cardiovascular Screening:    General: Screening Not Indicated and History Lipid Disorder      Diabetes Screening:     General: Screening Not Indicated and History Diabetes      Colorectal Cancer Screening:     General: Screening Not Indicated      Breast Cancer Screening:     General: Screening Not Indicated      Cervical Cancer Screening:    General: Screening Not Indicated      Osteoporosis Screening:    General: Screening Not Indicated      Abdominal Aortic Aneurysm (AAA) Screening:        General: Screening Not Indicated      Lung Cancer Screening:     General: Screening Not Indicated      Hepatitis C Screening:    General: Screening Not Indicated    Screening, Brief Intervention, and Referral to Treatment (SBIRT)      Brief Intervention  Alcohol & drug use screenings were reviewed  No concerns regarding substance use disorder identified          Marlin Benitez MD

## 2021-06-28 DIAGNOSIS — E11.9 TYPE 2 DIABETES MELLITUS WITHOUT COMPLICATION, UNSPECIFIED WHETHER LONG TERM INSULIN USE (HCC): ICD-10-CM

## 2021-07-05 DIAGNOSIS — E03.9 HYPOTHYROIDISM, UNSPECIFIED TYPE: ICD-10-CM

## 2021-07-06 RX ORDER — LEVOTHYROXINE SODIUM 0.07 MG/1
TABLET ORAL
Qty: 90 TABLET | Refills: 3 | Status: SHIPPED | OUTPATIENT
Start: 2021-07-06 | End: 2022-04-11

## 2021-08-02 DIAGNOSIS — E78.5 HYPERLIPIDEMIA, UNSPECIFIED HYPERLIPIDEMIA TYPE: ICD-10-CM

## 2021-08-02 RX ORDER — LOVASTATIN 40 MG/1
TABLET ORAL
Qty: 90 TABLET | Refills: 0 | Status: SHIPPED | OUTPATIENT
Start: 2021-08-02 | End: 2021-11-10

## 2021-09-14 ENCOUNTER — OFFICE VISIT (OUTPATIENT)
Dept: FAMILY MEDICINE CLINIC | Facility: CLINIC | Age: 86
End: 2021-09-14
Payer: MEDICARE

## 2021-09-14 VITALS
HEIGHT: 58 IN | WEIGHT: 130.6 LBS | HEART RATE: 113 BPM | TEMPERATURE: 98.2 F | RESPIRATION RATE: 16 BRPM | OXYGEN SATURATION: 96 % | SYSTOLIC BLOOD PRESSURE: 118 MMHG | BODY MASS INDEX: 27.41 KG/M2 | DIASTOLIC BLOOD PRESSURE: 66 MMHG

## 2021-09-14 DIAGNOSIS — R60.9 EDEMA, UNSPECIFIED TYPE: Primary | ICD-10-CM

## 2021-09-14 PROCEDURE — 99213 OFFICE O/P EST LOW 20 MIN: CPT | Performed by: FAMILY MEDICINE

## 2021-09-14 NOTE — PROGRESS NOTES
Assessment/Plan:    No problem-specific Assessment & Plan notes found for this encounter  There are no diagnoses linked to this encounter  Subjective:      Patient ID: Nicole Moses is a 80 y o  female  Patient describes a 4-6 week history of some increased edema of the ankles and feet  It was bad enough that it affected her a ability to walk in that she had to use a walker frequently  Over the last week or 2 it seems to be improving on its own the swelling seems to go down overnight now like it normally does  Several weeks ago it did not disappear overnight and that is when she had all the issues  She describes possibly some increased salty foods in the diet  She is also on nifedipine  The following portions of the patient's history were reviewed and updated as appropriate: allergies, current medications, past family history, past medical history, past social history, past surgical history and problem list     Review of Systems   Constitutional: Negative for activity change and appetite change  HENT: Negative for voice change  Eyes: Negative for visual disturbance  Respiratory: Negative for chest tightness and shortness of breath  Cardiovascular: Positive for leg swelling  Negative for chest pain and palpitations  Gastrointestinal: Negative for abdominal pain, blood in stool, constipation and diarrhea  Genitourinary: Negative for dysuria, vaginal bleeding and vaginal discharge  Skin: Negative for rash  Neurological: Negative for dizziness  Psychiatric/Behavioral: Negative for dysphoric mood  Objective:  Vitals:    09/14/21 1024   BP: 118/66   BP Location: Left arm   Patient Position: Sitting   Cuff Size: Adult   Pulse: (!) 113   Resp: 16   Temp: 98 2 °F (36 8 °C)   TempSrc: Tympanic   SpO2: 96%   Weight: 59 2 kg (130 lb 9 6 oz)   Height: 4' 10" (1 473 m)      Physical Exam  Constitutional:       Appearance: She is well-developed     Eyes: Conjunctiva/sclera: Conjunctivae normal    Cardiovascular:      Rate and Rhythm: Normal rate and regular rhythm  Heart sounds: Normal heart sounds  Comments: Mild edema   Pulmonary:      Effort: Pulmonary effort is normal    Skin:     General: Skin is warm and dry  Psychiatric:         Thought Content: Thought content normal            The transient edema has improved  I believe it is multifactorial as noted above  There is no current evidence of any fluid overload or congestive heart failure    I reviewed her labs and there is no other possible etiology at this time

## 2021-09-14 NOTE — PATIENT INSTRUCTIONS
AVOID THESE HIGH SALT FOODS:    SNACKS THAT TASTE SALTY: PRETZELS/CHIPSPOPCORN  CANNED SOUPS AND VEGGIES  FROZEN FOODS/ MICROWAVEABLE FOODS/ CANNED FOODS    AVOID ADDING EXTRA SALT TO THE MEALS       Try to keep the legs elevated above horizontal whenever sitting for any period of time    Try to get some extra walking in as well as this seems to help the swelling

## 2021-09-15 DIAGNOSIS — I10 HYPERTENSION, UNSPECIFIED TYPE: ICD-10-CM

## 2021-09-15 RX ORDER — LISINOPRIL 40 MG/1
TABLET ORAL
Qty: 90 TABLET | Refills: 3 | Status: SHIPPED | OUTPATIENT
Start: 2021-09-15

## 2022-01-01 ENCOUNTER — TELEPHONE (OUTPATIENT)
Dept: FAMILY MEDICINE CLINIC | Facility: CLINIC | Age: 87
End: 2022-01-01

## 2022-01-01 LAB
LEFT EYE DIABETIC RETINOPATHY: NORMAL
RIGHT EYE DIABETIC RETINOPATHY: NORMAL

## 2022-01-12 ENCOUNTER — OFFICE VISIT (OUTPATIENT)
Dept: FAMILY MEDICINE CLINIC | Facility: CLINIC | Age: 87
End: 2022-01-12
Payer: MEDICARE

## 2022-01-12 VITALS
SYSTOLIC BLOOD PRESSURE: 150 MMHG | OXYGEN SATURATION: 98 % | HEART RATE: 104 BPM | WEIGHT: 134 LBS | BODY MASS INDEX: 28.01 KG/M2 | RESPIRATION RATE: 20 BRPM | DIASTOLIC BLOOD PRESSURE: 90 MMHG | TEMPERATURE: 97.7 F

## 2022-01-12 DIAGNOSIS — F41.9 ANXIETY: ICD-10-CM

## 2022-01-12 DIAGNOSIS — N18.31 STAGE 3A CHRONIC KIDNEY DISEASE (HCC): ICD-10-CM

## 2022-01-12 DIAGNOSIS — I10 PRIMARY HYPERTENSION: ICD-10-CM

## 2022-01-12 DIAGNOSIS — E03.9 HYPOTHYROIDISM, UNSPECIFIED TYPE: ICD-10-CM

## 2022-01-12 DIAGNOSIS — R80.9 MICROALBUMINURIA: ICD-10-CM

## 2022-01-12 DIAGNOSIS — E11.40 TYPE 2 DIABETES MELLITUS WITH DIABETIC NEUROPATHY, WITHOUT LONG-TERM CURRENT USE OF INSULIN (HCC): ICD-10-CM

## 2022-01-12 DIAGNOSIS — E78.2 MIXED HYPERLIPIDEMIA: ICD-10-CM

## 2022-01-12 DIAGNOSIS — E11.00 TYPE 2 DIABETES MELLITUS WITH HYPEROSMOLARITY WITHOUT COMA, WITHOUT LONG-TERM CURRENT USE OF INSULIN (HCC): Primary | ICD-10-CM

## 2022-01-12 PROCEDURE — 99214 OFFICE O/P EST MOD 30 MIN: CPT | Performed by: FAMILY MEDICINE

## 2022-01-12 RX ORDER — DIAZEPAM 2 MG/1
TABLET ORAL
Qty: 30 TABLET | Refills: 4 | Status: SHIPPED | OUTPATIENT
Start: 2022-01-12

## 2022-01-12 NOTE — PATIENT INSTRUCTIONS
TO AVOID COVID (CORONAVIRUS) INFECTION  (including the Delta/ Omicron Variant) THE FOLLOWING ARE HELPFUL:    1  Avoid areas where there are a lot of people  Examples would be small restaurants and churches, small grocery stores etc   Keep 6 feet between you  2  If you must go indoors around people use a mask  Even fully immunized and boosted people can get Covid but usually much less risk and disease seems to be less severe  Most people (90%) in hospital with Covid today are NON- IMMUNIZED  I strongly recommend that you consider getting the Covid vaccine  It is effective against Delta and Omicron at preventing serious illness/ hospitalizations, as well as original strains  Patients age 11 and above are now eligible  Current information indicates that it is quite safe and extremely effective  The risk of dying from [place] after being fully immunized is less that one in a million  The risk of serious side effects from the vaccines is extremely low  The risk from getting Covid far outweighs the risk of serious side effects from the vaccine  The vaccine is available at most pharmacies and the [address] [place] [address]  "vaccines  gov" is a good website to check for vaccine availability  Call [address] [place] [address] to register for the vaccine : [phone] : option 7 ; Note that the [place] vaccine now been FULLY APPROVED by FDA  No longer Emergency Use status  Likely the Moderna and J+J vaccine will receive full approval soon as well  I strongly encourage Covid booster shots  All 3 vaccine boosters have now been approved and are available for adult and adolescent patients  The boosters are likely available at your local pharmacy  You may contact [facility] as well for boosters or primary vaccine dosing  The Pfizer and Moderna vaccine boosters  should be given 6 months after the last primary shot; the J/J vaccine can be given 2 mos  After the original shot        The Omicron variant has shown some resistance to the 3 vaccines so boosters are highly recommended and appear to boost protection back to 85-90% against severe illness  There is currently a shortage locally and nationally of Covid testing kits; this may impact your travel plans  As well: the results are taking 3-4 days to return due to high volumes  Consider physical therapy to help the balance and the strength of the legs    We could do so in the spring when the weather is better

## 2022-01-12 NOTE — PROGRESS NOTES
Assessment/Plan:    No problem-specific Assessment & Plan notes found for this encounter  Diagnoses and all orders for this visit:    Type 2 diabetes mellitus with hyperosmolarity without coma, without long-term current use of insulin (HCC)    Hypothyroidism, unspecified type    Primary hypertension    Anxiety    Microalbuminuria    Mixed hyperlipidemia          Subjective:      Patient ID: Susan Mckinney is a 80 y o  female  PATIENT RETURNS FOR FOLLOW-UP OF CHRONIC MEDICAL CONDITIONS  ANY HOSPITAL VISITS, EMERGENCY VISITS AND OTHER PROVIDER VISITS SINCE LAST TIME WERE REVIEWED  MEDS WERE REVIEWED AND NO SIDE EFFECTS  NO NEW ISSUES  UNLESS NOTED BELOW  NO NEW MEDICAL PROVIDER REPORTED  THE CHRONIC DISEASES LISTED ABOVE ARE STABLE AND UNCHANGED/ THE PLAN OF CARE FOR THOSE WILL REMAIN UNCHANGED UNLESS NOTED BELOW  No covid vaccine   Reviewed       Sugars seem OK in am : stopped metformin : diarrhea       The following portions of the patient's history were reviewed and updated as appropriate: allergies, current medications, past family history, past medical history, past social history, past surgical history and problem list     Review of Systems      Objective:  Vitals:    01/12/22 0915   BP: 150/90   BP Location: Left arm   Patient Position: Sitting   Cuff Size: Standard   Pulse: 104   Resp: 20   Temp: 97 7 °F (36 5 °C)   TempSrc: Temporal   SpO2: 98%   Weight: 60 8 kg (134 lb)      Physical Exam

## 2022-01-26 ENCOUNTER — APPOINTMENT (OUTPATIENT)
Dept: LAB | Facility: CLINIC | Age: 87
End: 2022-01-26
Payer: MEDICARE

## 2022-01-26 DIAGNOSIS — E03.9 HYPOTHYROIDISM, UNSPECIFIED TYPE: ICD-10-CM

## 2022-01-26 DIAGNOSIS — E11.00 TYPE 2 DIABETES MELLITUS WITH HYPEROSMOLARITY WITHOUT COMA, WITHOUT LONG-TERM CURRENT USE OF INSULIN (HCC): ICD-10-CM

## 2022-01-26 LAB
ANION GAP SERPL CALCULATED.3IONS-SCNC: 6 MMOL/L (ref 4–13)
BUN SERPL-MCNC: 22 MG/DL (ref 5–25)
CALCIUM SERPL-MCNC: 9.9 MG/DL (ref 8.3–10.1)
CHLORIDE SERPL-SCNC: 107 MMOL/L (ref 100–108)
CO2 SERPL-SCNC: 22 MMOL/L (ref 21–32)
CREAT SERPL-MCNC: 1.37 MG/DL (ref 0.6–1.3)
EST. AVERAGE GLUCOSE BLD GHB EST-MCNC: 237 MG/DL
GFR SERPL CREATININE-BSD FRML MDRD: 34 ML/MIN/1.73SQ M
GLUCOSE P FAST SERPL-MCNC: 205 MG/DL (ref 65–99)
HBA1C MFR BLD: 9.9 %
POTASSIUM SERPL-SCNC: 5 MMOL/L (ref 3.5–5.3)
SODIUM SERPL-SCNC: 135 MMOL/L (ref 136–145)
TSH SERPL DL<=0.05 MIU/L-ACNC: 5.1 UIU/ML (ref 0.36–3.74)

## 2022-01-26 PROCEDURE — 80048 BASIC METABOLIC PNL TOTAL CA: CPT

## 2022-01-26 PROCEDURE — 84443 ASSAY THYROID STIM HORMONE: CPT

## 2022-01-26 PROCEDURE — 36415 COLL VENOUS BLD VENIPUNCTURE: CPT

## 2022-01-26 PROCEDURE — 83036 HEMOGLOBIN GLYCOSYLATED A1C: CPT

## 2022-03-17 DIAGNOSIS — I10 ESSENTIAL HYPERTENSION: ICD-10-CM

## 2022-03-17 RX ORDER — NIFEDIPINE 60 MG/1
TABLET, FILM COATED, EXTENDED RELEASE ORAL
Qty: 90 TABLET | Refills: 3 | Status: SHIPPED | OUTPATIENT
Start: 2022-03-17

## 2022-04-11 DIAGNOSIS — E03.9 HYPOTHYROIDISM, UNSPECIFIED TYPE: ICD-10-CM

## 2022-04-11 RX ORDER — LEVOTHYROXINE SODIUM 0.07 MG/1
TABLET ORAL
Qty: 90 TABLET | Refills: 3 | Status: SHIPPED | OUTPATIENT
Start: 2022-04-11

## 2022-07-15 ENCOUNTER — RA CDI HCC (OUTPATIENT)
Dept: OTHER | Facility: HOSPITAL | Age: 87
End: 2022-07-15

## 2022-07-15 NOTE — PROGRESS NOTES
[address]  coding opportunities     E11 22     Chart Reviewed number of suggestions sent to Provider: 1     Patients Insurance     Medicare Insurance: [employer]

## 2022-07-21 ENCOUNTER — OFFICE VISIT (OUTPATIENT)
Dept: FAMILY MEDICINE CLINIC | Facility: CLINIC | Age: 87
End: 2022-07-21
Payer: MEDICARE

## 2022-07-21 VITALS
SYSTOLIC BLOOD PRESSURE: 128 MMHG | BODY MASS INDEX: 27.54 KG/M2 | OXYGEN SATURATION: 97 % | HEIGHT: 58 IN | RESPIRATION RATE: 18 BRPM | WEIGHT: 131.2 LBS | TEMPERATURE: 98 F | DIASTOLIC BLOOD PRESSURE: 60 MMHG | HEART RATE: 95 BPM

## 2022-07-21 DIAGNOSIS — N18.31 STAGE 3A CHRONIC KIDNEY DISEASE (HCC): ICD-10-CM

## 2022-07-21 DIAGNOSIS — E03.9 HYPOTHYROIDISM, UNSPECIFIED TYPE: ICD-10-CM

## 2022-07-21 DIAGNOSIS — E78.2 MIXED HYPERLIPIDEMIA: ICD-10-CM

## 2022-07-21 DIAGNOSIS — R80.9 MICROALBUMINURIA: ICD-10-CM

## 2022-07-21 DIAGNOSIS — E11.40 TYPE 2 DIABETES MELLITUS WITH DIABETIC NEUROPATHY, WITHOUT LONG-TERM CURRENT USE OF INSULIN (HCC): ICD-10-CM

## 2022-07-21 DIAGNOSIS — D64.9 ANEMIA, UNSPECIFIED TYPE: Chronic | ICD-10-CM

## 2022-07-21 DIAGNOSIS — Z00.00 MEDICARE ANNUAL WELLNESS VISIT, SUBSEQUENT: Primary | ICD-10-CM

## 2022-07-21 DIAGNOSIS — I10 PRIMARY HYPERTENSION: ICD-10-CM

## 2022-07-21 DIAGNOSIS — M70.52 OTHER BURSITIS OF KNEE, LEFT KNEE: ICD-10-CM

## 2022-07-21 PROCEDURE — G0439 PPPS, SUBSEQ VISIT: HCPCS | Performed by: FAMILY MEDICINE

## 2022-07-21 PROCEDURE — 20610 DRAIN/INJ JOINT/BURSA W/O US: CPT | Performed by: FAMILY MEDICINE

## 2022-07-21 PROCEDURE — 99214 OFFICE O/P EST MOD 30 MIN: CPT | Performed by: FAMILY MEDICINE

## 2022-07-21 RX ORDER — LIDOCAINE HYDROCHLORIDE 10 MG/ML
2 INJECTION, SOLUTION INFILTRATION; PERINEURAL
Status: COMPLETED | OUTPATIENT
Start: 2022-07-21 | End: 2022-07-21

## 2022-07-21 RX ORDER — TRIAMCINOLONE ACETONIDE 40 MG/ML
20 INJECTION, SUSPENSION INTRA-ARTICULAR; INTRAMUSCULAR
Status: COMPLETED | OUTPATIENT
Start: 2022-07-21 | End: 2022-07-21

## 2022-07-21 RX ADMIN — TRIAMCINOLONE ACETONIDE 20 MG: 40 INJECTION, SUSPENSION INTRA-ARTICULAR; INTRAMUSCULAR at 11:25

## 2022-07-21 RX ADMIN — LIDOCAINE HYDROCHLORIDE 2 ML: 10 INJECTION, SOLUTION INFILTRATION; PERINEURAL at 11:25

## 2022-07-21 NOTE — ASSESSMENT & PLAN NOTE
Lab Results   Component Value Date    EGFR 34 01/26/2022    EGFR 36 06/23/2021    EGFR 34 11/23/2020    CREATININE 1 37 (H) 01/26/2022    CREATININE 1 31 (H) 06/23/2021    CREATININE 1 38 (H) 11/23/2020     Stable past few years ; reviewed no use NSAIDS

## 2022-07-21 NOTE — PROGRESS NOTES
Assessment/Plan:    Stage 3a chronic kidney disease ([place] [address] )  Lab Results   Component Value Date    EGFR 34 01/26/2022    EGFR 36 06/23/2021    EGFR 34 11/23/2020    CREATININE 1 37 (H) 01/26/2022    CREATININE 1 31 (H) 06/23/2021    CREATININE 1 38 (H) 11/23/2020     Stable past few years        Diagnoses and all orders for this visit:    Medicare annual wellness visit, subsequent    Type 2 diabetes mellitus with diabetic neuropathy, without long-term current use of insulin ([place] [address] )    Hypothyroidism, unspecified type    Primary hypertension    Stage 3a chronic kidney disease ([place] [address] )    Anemia, unspecified type    Microalbuminuria    Mixed hyperlipidemia          Subjective:      Patient ID: Kelya Watts is a 80 y o  female  PATIENT RETURNS FOR FOLLOW-UP OF CHRONIC MEDICAL CONDITIONS  ANY HOSPITAL VISITS, EMERGENCY VISITS AND OTHER PROVIDER VISITS SINCE LAST TIME WERE REVIEWED  MEDS WERE REVIEWED AND NO SIDE EFFECTS  NO NEW ISSUES  UNLESS NOTED BELOW  NO NEW MEDICAL PROVIDER REPORTED  THE CHRONIC DISEASES LISTED ABOVE ARE STABLE AND UNCHANGED/ THE PLAN OF CARE FOR THOSE WILL REMAIN UNCHANGED UNLESS NOTED BELOW  Awv/sub     Sugars avg 150-160; occ hypos   Several falls past 6 mos : mechanical ;     Reg eye cks : no DM       The following portions of the patient's history were reviewed and updated as appropriate: allergies, current medications, past family history, past medical history, past social history, past surgical history and problem list     Review of Systems   Constitutional: Negative for activity change and appetite change  HENT: Negative for voice change  Eyes: Negative for visual disturbance  Respiratory: Negative for chest tightness and shortness of breath  Cardiovascular: Negative for chest pain, palpitations and leg swelling  Gastrointestinal: Negative for abdominal pain, blood in stool, constipation and diarrhea     Genitourinary: Negative for dysuria, vaginal bleeding and vaginal discharge  Skin: Negative for rash  Neurological: Negative for dizziness  Psychiatric/Behavioral: Negative for dysphoric mood  Objective:  Vitals:    07/21/22 1052   BP: 128/60   BP Location: Left arm   Patient Position: Sitting   Cuff Size: Large   Pulse: 95   Resp: 18   Temp: 98 °F (36 7 °C)   TempSrc: Temporal   SpO2: 97%   Weight: 59 5 kg (131 lb 3 2 oz)   Height: 4' 9 5" (1 461 m)      Physical Exam  Constitutional:       Appearance: She is well-developed  HENT:      Head: Normocephalic and atraumatic  Eyes:      Conjunctiva/sclera: Conjunctivae normal    Neck:      Thyroid: No thyromegaly  Cardiovascular:      Rate and Rhythm: Normal rate and regular rhythm  Heart sounds: Normal heart sounds  No murmur heard  Pulmonary:      Effort: Pulmonary effort is normal  No respiratory distress  Breath sounds: Normal breath sounds  Musculoskeletal:      Cervical back: Neck supple  Lymphadenopathy:      Cervical: No cervical adenopathy  Skin:     General: Skin is warm and dry  Psychiatric:         Behavior: Behavior normal            Patient's chronic problems that were reviewed today are stable  Recent hospital stays reviewed  Recent labs and imaging reviewed  Recent visits to other providers reviewed  Meds reviewed and no changes made  Appropriate labs and imaging were ordered  Preventive measures appropriate for age and sex were reviewed with patient  Immunizations were updated as appropriate

## 2022-07-21 NOTE — PATIENT INSTRUCTIONS
Medicare Preventive Visit Patient Instructions  Thank you for completing your Welcome to Medicare Visit or Medicare Annual Wellness Visit today  Your next wellness visit will be due in one year (7/22/2023)  The screening/preventive services that you may require over the next 5-10 years are detailed below  Some tests may not apply to you based off risk factors and/or age  Screening tests ordered at today's visit but not completed yet may show as past due  Also, please note that scanned in results may not display below  Preventive Screenings:  Service Recommendations Previous Testing/Comments   Colorectal Cancer Screening  * Colonoscopy    * Fecal Occult Blood Test (FOBT)/Fecal Immunochemical Test (FIT)  * Fecal DNA/Cologuard Test  * Flexible Sigmoidoscopy Age: 54-65 years old   Colonoscopy: every 10 years (may be performed more frequently if at higher risk)  OR  FOBT/FIT: every 1 year  OR  Cologuard: every 3 years  OR  Sigmoidoscopy: every 5 years  Screening may be recommended earlier than age 48 if at higher risk for colorectal cancer  Also, an individualized decision between you and your healthcare provider will decide whether screening between the ages of 74-80 would be appropriate  Colonoscopy: Not on file  FOBT/FIT: Not on file  Cologuard: Not on file  Sigmoidoscopy: Not on file    Screening Not Indicated     Breast Cancer Screening Age: 36 years old  Frequency: every 1-2 years  Not required if history of left and right mastectomy Mammogram: Not on file    Screening Not Indicated   Cervical Cancer Screening Between the ages of 21-29, pap smear recommended once every 3 years  Between the ages of 33-67, can perform pap smear with HPV co-testing every 5 years     Recommendations may differ for women with a history of total hysterectomy, cervical cancer, or abnormal pap smears in past  Pap Smear: Not on file    Screening Not Indicated   Hepatitis C Screening Once for adults born between [facility] frequently in patients at high risk for Hepatitis C Hep C Antibody: Not on file    Screening Not Indicated   Diabetes Screening 1-2 times per year if you're at risk for diabetes or have pre-diabetes Fasting glucose: 205 mg/dL   A1C: 9 9 %    Screening Not Indicated  History Diabetes   Cholesterol Screening Once every 5 years if you don't have a lipid disorder  May order more often based on risk factors  Lipid panel: Not on file    Screening Not Indicated  History Lipid Disorder     Other Preventive Screenings Covered by Medicare:  1  Abdominal Aortic Aneurysm (AAA) Screening: covered once if your at risk  You're considered to be at risk if you have a family history of AAA  2  Lung Cancer Screening: covers low dose CT scan once per year if you meet all of the following conditions: (1) Age 50-69; (2) No signs or symptoms of lung cancer; (3) Current smoker or have quit smoking within the last 15 years; (4) You have a tobacco smoking history of at least 30 pack years (packs per day multiplied by number of years you smoked); (5) You get a written order from a healthcare provider  3  Glaucoma Screening: covered annually if you're considered high risk: (1) You have diabetes OR (2) Family history of glaucoma OR (3)  aged 48 and older OR (3)  American aged 72 and older  3  Osteoporosis Screening: covered every 2 years if you meet one of the following conditions: (1) You're estrogen deficient and at risk for osteoporosis based off medical history and other findings; (2) Have a vertebral abnormality; (3) On glucocorticoid therapy for more than 3 months; (4) Have primary hyperparathyroidism; (5) On osteoporosis medications and need to assess response to drug therapy  · Last bone density test (DXA Scan): Not on file  5  HIV Screening: covered annually if you're between the age of 12-76  Also covered annually if you are younger than 13 and older than 72 with risk factors for HIV infection   For pregnant patients, it is covered up to 3 times per pregnancy  Immunizations:  Immunization Recommendations   Influenza Vaccine Annual influenza vaccination during flu season is recommended for all persons aged >= 6 months who do not have contraindications   Pneumococcal Vaccine (Prevnar and Pneumovax)  * Prevnar = PCV13  * Pneumovax = PPSV23   Adults 25-60 years old: 1-3 doses may be recommended based on certain risk factors  Adults 72 years old: Prevnar (PCV13) vaccine recommended followed by Pneumovax (PPSV23) vaccine  If already received PPSV23 since turning 65, then PCV13 recommended at least one year after PPSV23 dose  Hepatitis B Vaccine 3 dose series if at intermediate or high risk (ex: diabetes, end stage renal disease, liver disease)   Tetanus (Td) Vaccine - COST NOT COVERED BY MEDICARE PART B Following completion of primary series, a booster dose should be given every 10 years to maintain immunity against tetanus  Td may also be given as tetanus wound prophylaxis  Tdap Vaccine - COST NOT COVERED BY MEDICARE PART B Recommended at least once for all adults  For pregnant patients, recommended with each pregnancy  Shingles Vaccine (Shingrix) - COST NOT COVERED BY MEDICARE PART B  2 shot series recommended in those aged 48 and above     Health Maintenance Due:  There are no preventive care reminders to display for this patient  Immunizations Due:      Topic Date Due    COVID-19 Vaccine (1) Never done    Influenza Vaccine (1) 09/01/2022     Advance Directives   What are advance directives? Advance directives are legal documents that state your wishes and plans for medical care  These plans are made ahead of time in case you lose your ability to make decisions for yourself  Advance directives can apply to any medical decision, such as the treatments you want, and if you want to donate organs  What are the types of advance directives?   There are many types of advance directives, and each state has rules about how to use them  You may choose a combination of any of the following:  · Living will: This is a written record of the treatment you want  You can also choose which treatments you do not want, which to limit, and which to stop at a certain time  This includes surgery, medicine, IV fluid, and tube feedings  · Durable power of  for healthcare [place] [facility]): This is a written record that states who you want to make healthcare choices for you when you are unable to make them for yourself  This person, called a proxy, is usually a family member or a friend  You may choose more than 1 proxy  · Do not resuscitate (DNR) order:  A DNR order is used in case your heart stops beating or you stop breathing  It is a request not to have certain forms of treatment, such as CPR  A DNR order may be included in other types of advance directives  · Medical directive: This covers the care that you want if you are in a coma, near death, or unable to make decisions for yourself  You can list the treatments you want for each condition  Treatment may include pain medicine, surgery, blood transfusions, dialysis, IV or tube feedings, and a ventilator (breathing machine)  · Values history: This document has questions about your views, beliefs, and how you feel and think about life  This information can help others choose the care that you would choose  Why are advance directives important? An advance directive helps you control your care  Although spoken wishes may be used, it is better to have your wishes written down  Spoken wishes can be misunderstood, or not followed  Treatments may be given even if you do not want them  An advance directive may make it easier for your family to make difficult choices about your care  Fall Prevention    Fall prevention  includes ways to make your home and other areas safer  It also includes ways you can move more carefully to prevent a fall   Health conditions that cause changes in your blood pressure, vision, or muscle strength and coordination may increase your risk for falls  Medicines may also increase your risk for falls if they make you dizzy, weak, or sleepy  Fall prevention tips:   · Stand or sit up slowly  · Use assistive devices as directed  · Wear shoes that fit well and have soles that   · Wear a personal alarm  · Stay active  · Manage your medical conditions  Home Safety Tips:  · Add items to prevent falls in the bathroom  · Keep paths clear  · Install bright lights in your home  · Keep items you use often on shelves within reach  · Paint or place reflective tape on the edges of your stairs  Urinary Incontinence   Urinary incontinence (UI)  is when you lose control of your bladder  UI develops because your bladder cannot store or empty urine properly  The 3 most common types of UI are stress incontinence, urge incontinence, or both  Medicines:   · May be given to help strengthen your bladder control  Report any side effects of medication to your healthcare provider  Do pelvic muscle exercises often:  Your pelvic muscles help you stop urinating  Squeeze these muscles tight for 5 seconds, then relax for 5 seconds  Gradually work up to squeezing for 10 seconds  Do 3 sets of 15 repetitions a day, or as directed  This will help strengthen your pelvic muscles and improve bladder control  Train your bladder:  Go to the bathroom at set times, such as every 2 hours, even if you do not feel the urge to go  You can also try to hold your urine when you feel the urge to go  For example, hold your urine for 5 minutes when you feel the urge to go  As that becomes easier, hold your urine for 10 minutes  Self-care:   · Keep a UI record  Write down how often you leak urine and how much you leak  Make a note of what you were doing when you leaked urine  · Drink liquids as directed   You may need to limit the amount of liquid you drink to help control your urine leakage  Do not drink any liquid right before you go to bed  Limit or do not have drinks that contain caffeine or alcohol  · Prevent constipation  Eat a variety of high-fiber foods  Good examples are high-fiber cereals, beans, vegetables, and whole-grain breads  Walking is the best way to trigger your intestines to have a bowel movement  · Exercise regularly and maintain a healthy weight  Weight loss and exercise will decrease pressure on your bladder and help you control your leakage  · Use a catheter as directed  to help empty your bladder  A catheter is a tiny, plastic tube that is put into your bladder to drain your urine  · Go to behavior therapy as directed  Behavior therapy may be used to help you learn to control your urge to urinate  Weight Management   Why it is important to manage your weight:  Being overweight increases your risk of health conditions such as heart disease, high blood pressure, type 2 diabetes, and certain types of cancer  It can also increase your risk for osteoarthritis, sleep apnea, and other respiratory problems  Aim for a slow, steady weight loss  Even a small amount of weight loss can lower your risk of health problems  How to lose weight safely:  A safe and healthy way to lose weight is to eat fewer calories and get regular exercise  You can lose up about 1 pound a week by decreasing the number of calories you eat by 500 calories each day  Healthy meal plan for weight management:  A healthy meal plan includes a variety of foods, contains fewer calories, and helps you stay healthy  A healthy meal plan includes the following:  · Eat whole-grain foods more often  A healthy meal plan should contain fiber  Fiber is the part of grains, fruits, and vegetables that is not broken down by your body  Whole-grain foods are healthy and provide extra fiber in your diet   Some examples of whole-grain foods are whole-wheat breads and pastas, oatmeal, brown rice, and bulgur  · Eat a variety of vegetables every day  Include dark, leafy greens such as spinach, kale, chip greens, and mustard greens  Eat yellow and orange vegetables such as carrots, sweet potatoes, and winter squash  · Eat a variety of fruits every day  Choose fresh or canned fruit (canned in its own juice or light syrup) instead of juice  Fruit juice has very little or no fiber  · Eat low-fat dairy foods  Drink fat-free (skim) milk or 1% milk  Eat fat-free yogurt and low-fat cottage cheese  Try low-fat cheeses such as mozzarella and other reduced-fat cheeses  · Choose meat and other protein foods that are low in fat  Choose beans or other legumes such as split peas or lentils  Choose fish, skinless poultry (chicken or turkey), or lean cuts of red meat (beef or pork)  Before you cook meat or poultry, cut off any visible fat  · Use less fat and oil  Try baking foods instead of frying them  Add less fat, such as margarine, sour cream, regular salad dressing and mayonnaise to foods  Eat fewer high-fat foods  Some examples of high-fat foods include french fries, doughnuts, ice cream, and cakes  · Eat fewer sweets  Limit foods and drinks that are high in sugar  This includes candy, cookies, regular soda, and sweetened drinks  Exercise:  Exercise at least 30 minutes per day on most days of the week  Some examples of exercise include walking, biking, dancing, and swimming  You can also fit in more physical activity by taking the stairs instead of the elevator or parking farther away from stores  Ask your healthcare provider about the best exercise plan for you  © Copyright [employer] 2018 Information is for End User's use only and may not be sold, redistributed or otherwise used for commercial purposes   All illustrations and images included in CareNotes® are the copyrighted property of [employer] M [employer]  [employer] [address] [place] [address] THAT HELP PREVENT FALLS:    DRINK PLENTY OF FLUIDS : 2-3 QTS PER DAY : URINE SHOULD BE LIGHT COLOR  GET REGULAR EYE CHECK UP  USE NIGHT LIGHTS  ELIMINATE ALL THROW RUGS  DO SOME WALKING EVERY DAY AND BALANCE EXERCISES  USE NON SLIP FOOT WEARBATH MATS AND GRAB BARS HELP  WHEN RISING: ALWAYS WAIT FOR 15-20 SECONDS BEFORE INITIATING WALK  CONSIDER DOING [facility]OGA OR [facility]    Medicare Preventive Visit Patient Instructions  Thank you for completing your Welcome to Medicare Visit or Medicare Annual Wellness Visit today  Your next wellness visit will be due in one year (7/22/2023)  The screening/preventive services that you may require over the next 5-10 years are detailed below  Some tests may not apply to you based off risk factors and/or age  Screening tests ordered at today's visit but not completed yet may show as past due  Also, please note that scanned in results may not display below  Preventive Screenings:  Service Recommendations Previous Testing/Comments   Colorectal Cancer Screening  * Colonoscopy    * Fecal Occult Blood Test (FOBT)/Fecal Immunochemical Test (FIT)  * Fecal DNA/Cologuard Test  * Flexible Sigmoidoscopy Age: 54-65 years old   Colonoscopy: every 10 years (may be performed more frequently if at higher risk)  OR  FOBT/FIT: every 1 year  OR  Cologuard: every 3 years  OR  Sigmoidoscopy: every 5 years  Screening may be recommended earlier than age 48 if at higher risk for colorectal cancer  Also, an individualized decision between you and your healthcare provider will decide whether screening between the ages of 74-80 would be appropriate   Colonoscopy: Not on file  FOBT/FIT: Not on file  Cologuard: Not on file  Sigmoidoscopy: Not on file    Screening Not Indicated  Screening Not Indicated     Breast Cancer Screening Age: 36 years old  Frequency: every 1-2 years  Not required if history of left and right mastectomy Mammogram: Not on file    Screening Not Indicated  Screening Not Indicated   Cervical Cancer Screening Between the ages of 21-29, pap smear recommended once every 3 years  Between the ages of 33-67, can perform pap smear with HPV co-testing every 5 years  Recommendations may differ for women with a history of total hysterectomy, cervical cancer, or abnormal pap smears in past  Pap Smear: Not on file    Screening Not Indicated  Screening Not Indicated   Hepatitis C Screening Once for adults born between 1945 and 1965  More frequently in patients at high risk for Hepatitis C Hep C Antibody: Not on file    Screening Not Indicated  Screening Not Indicated   Diabetes Screening 1-2 times per year if you're at risk for diabetes or have pre-diabetes Fasting glucose: 205 mg/dL   A1C: 9 9 %    Screening Not Indicated  History Diabetes  Screening Not Indicated  History Diabetes   Cholesterol Screening Once every 5 years if you don't have a lipid disorder  May order more often based on risk factors  Lipid panel: Not on file    Screening Not Indicated  History Lipid Disorder  Screening Not Indicated  History Lipid Disorder     Other Preventive Screenings Covered by Medicare:  1  Abdominal Aortic Aneurysm (AAA) Screening: covered once if your at risk  You're considered to be at risk if you have a family history of AAA  2  Lung Cancer Screening: covers low dose CT scan once per year if you meet all of the following conditions: (1) Age 50-69; (2) No signs or symptoms of lung cancer; (3) Current smoker or have quit smoking within the last 15 years; (4) You have a tobacco smoking history of at least 30 pack years (packs per day multiplied by number of years you smoked); (5) You get a written order from a healthcare provider  3  Glaucoma Screening: covered annually if you're considered high risk: (1) You have diabetes OR (2) Family history of glaucoma OR (3)  aged 48 and older OR (3)  American aged 72 and older  3   Osteoporosis Screening: covered every 2 years if you meet one of the following conditions: (1) You're estrogen deficient and at risk for osteoporosis based off medical history and other findings; (2) Have a vertebral abnormality; (3) On glucocorticoid therapy for more than 3 months; (4) Have primary hyperparathyroidism; (5) On osteoporosis medications and need to assess response to drug therapy  · Last bone density test (DXA Scan): Not on file  5  HIV Screening: covered annually if you're between the age of 12-76  Also covered annually if you are younger than 13 and older than 72 with risk factors for HIV infection  For pregnant patients, it is covered up to 3 times per pregnancy  Immunizations:  Immunization Recommendations   Influenza Vaccine Annual influenza vaccination during flu season is recommended for all persons aged >= 6 months who do not have contraindications   Pneumococcal Vaccine (Prevnar and Pneumovax)  * Prevnar = PCV13  * Pneumovax = PPSV23   Adults 25-60 years old: 1-3 doses may be recommended based on certain risk factors  Adults 72 years old: Prevnar (PCV13) vaccine recommended followed by Pneumovax (PPSV23) vaccine  If already received PPSV23 since turning 65, then PCV13 recommended at least one year after PPSV23 dose  Hepatitis B Vaccine 3 dose series if at intermediate or high risk (ex: diabetes, end stage renal disease, liver disease)   Tetanus (Td) Vaccine - COST NOT COVERED BY MEDICARE PART B Following completion of primary series, a booster dose should be given every 10 years to maintain immunity against tetanus  Td may also be given as tetanus wound prophylaxis  Tdap Vaccine - COST NOT COVERED BY MEDICARE PART B Recommended at least once for all adults  For pregnant patients, recommended with each pregnancy  Shingles Vaccine (Shingrix) - COST NOT COVERED BY MEDICARE PART B  2 shot series recommended in those aged 48 and above     Health Maintenance Due:  There are no preventive care reminders to display for this patient    Immunizations Due:      Topic Date Due    COVID-19 Vaccine (1) Never done   Avery Amaya Influenza Vaccine (1) 09/01/2022     Advance Directives   What are advance directives? Advance directives are legal documents that state your wishes and plans for medical care  These plans are made ahead of time in case you lose your ability to make decisions for yourself  Advance directives can apply to any medical decision, such as the treatments you want, and if you want to donate organs  What are the types of advance directives? There are many types of advance directives, and each state has rules about how to use them  You may choose a combination of any of the following:  · Living will: This is a written record of the treatment you want  You can also choose which treatments you do not want, which to limit, and which to stop at a certain time  This includes surgery, medicine, IV fluid, and tube feedings  · Durable power of  for healthcare [place] [facility]): This is a written record that states who you want to make healthcare choices for you when you are unable to make them for yourself  This person, called a proxy, is usually a family member or a friend  You may choose more than 1 proxy  · Do not resuscitate (DNR) order:  A DNR order is used in case your heart stops beating or you stop breathing  It is a request not to have certain forms of treatment, such as CPR  A DNR order may be included in other types of advance directives  · Medical directive: This covers the care that you want if you are in a coma, near death, or unable to make decisions for yourself  You can list the treatments you want for each condition  Treatment may include pain medicine, surgery, blood transfusions, dialysis, IV or tube feedings, and a ventilator (breathing machine)  · Values history: This document has questions about your views, beliefs, and how you feel and think about life  This information can help others choose the care that you would choose  Why are advance directives important?   An advance directive helps you control your care  Although spoken wishes may be used, it is better to have your wishes written down  Spoken wishes can be misunderstood, or not followed  Treatments may be given even if you do not want them  An advance directive may make it easier for your family to make difficult choices about your care  Fall Prevention    Fall prevention  includes ways to make your home and other areas safer  It also includes ways you can move more carefully to prevent a fall  Health conditions that cause changes in your blood pressure, vision, or muscle strength and coordination may increase your risk for falls  Medicines may also increase your risk for falls if they make you dizzy, weak, or sleepy  Fall prevention tips:   · Stand or sit up slowly  · Use assistive devices as directed  · Wear shoes that fit well and have soles that   · Wear a personal alarm  · Stay active  · Manage your medical conditions  Home Safety Tips:  · Add items to prevent falls in the bathroom  · Keep paths clear  · Install bright lights in your home  · Keep items you use often on shelves within reach  · Paint or place reflective tape on the edges of your stairs  Urinary Incontinence   Urinary incontinence (UI)  is when you lose control of your bladder  UI develops because your bladder cannot store or empty urine properly  The 3 most common types of UI are stress incontinence, urge incontinence, or both  Medicines:   · May be given to help strengthen your bladder control  Report any side effects of medication to your healthcare provider  Do pelvic muscle exercises often:  Your pelvic muscles help you stop urinating  Squeeze these muscles tight for 5 seconds, then relax for 5 seconds  Gradually work up to squeezing for 10 seconds  Do 3 sets of 15 repetitions a day, or as directed  This will help strengthen your pelvic muscles and improve bladder control    Train your bladder:  Go to the bathroom at set times, such as every 2 hours, even if you do not feel the urge to go  You can also try to hold your urine when you feel the urge to go  For example, hold your urine for 5 minutes when you feel the urge to go  As that becomes easier, hold your urine for 10 minutes  Self-care:   · Keep a UI record  Write down how often you leak urine and how much you leak  Make a note of what you were doing when you leaked urine  · Drink liquids as directed  You may need to limit the amount of liquid you drink to help control your urine leakage  Do not drink any liquid right before you go to bed  Limit or do not have drinks that contain caffeine or alcohol  · Prevent constipation  Eat a variety of high-fiber foods  Good examples are high-fiber cereals, beans, vegetables, and whole-grain breads  Walking is the best way to trigger your intestines to have a bowel movement  · Exercise regularly and maintain a healthy weight  Weight loss and exercise will decrease pressure on your bladder and help you control your leakage  · Use a catheter as directed  to help empty your bladder  A catheter is a tiny, plastic tube that is put into your bladder to drain your urine  · Go to behavior therapy as directed  Behavior therapy may be used to help you learn to control your urge to urinate  Weight Management   Why it is important to manage your weight:  Being overweight increases your risk of health conditions such as heart disease, high blood pressure, type 2 diabetes, and certain types of cancer  It can also increase your risk for osteoarthritis, sleep apnea, and other respiratory problems  Aim for a slow, steady weight loss  Even a small amount of weight loss can lower your risk of health problems  How to lose weight safely:  A safe and healthy way to lose weight is to eat fewer calories and get regular exercise  You can lose up about 1 pound a week by decreasing the number of calories you eat by 500 calories each day     Healthy meal plan for weight management:  A healthy meal plan includes a variety of foods, contains fewer calories, and helps you stay healthy  A healthy meal plan includes the following:  · Eat whole-grain foods more often  A healthy meal plan should contain fiber  Fiber is the part of grains, fruits, and vegetables that is not broken down by your body  Whole-grain foods are healthy and provide extra fiber in your diet  Some examples of whole-grain foods are whole-wheat breads and pastas, oatmeal, brown rice, and bulgur  · Eat a variety of vegetables every day  Include dark, leafy greens such as spinach, kale, chip greens, and mustard greens  Eat yellow and orange vegetables such as carrots, sweet potatoes, and winter squash  · Eat a variety of fruits every day  Choose fresh or canned fruit (canned in its own juice or light syrup) instead of juice  Fruit juice has very little or no fiber  · Eat low-fat dairy foods  Drink fat-free (skim) milk or 1% milk  Eat fat-free yogurt and low-fat cottage cheese  Try low-fat cheeses such as mozzarella and other reduced-fat cheeses  · Choose meat and other protein foods that are low in fat  Choose beans or other legumes such as split peas or lentils  Choose fish, skinless poultry (chicken or turkey), or lean cuts of red meat (beef or pork)  Before you cook meat or poultry, cut off any visible fat  · Use less fat and oil  Try baking foods instead of frying them  Add less fat, such as margarine, sour cream, regular salad dressing and mayonnaise to foods  Eat fewer high-fat foods  Some examples of high-fat foods include french fries, doughnuts, ice cream, and cakes  · Eat fewer sweets  Limit foods and drinks that are high in sugar  This includes candy, cookies, regular soda, and sweetened drinks  Exercise:  Exercise at least 30 minutes per day on most days of the week  Some examples of exercise include walking, biking, dancing, and swimming   You can also fit in more physical activity by taking the stairs instead of the elevator or parking farther away from stores  Ask your healthcare provider about the best exercise plan for you  © Copyright [employer] 2018 Information is for End User's use only and may not be sold, redistributed or otherwise used for commercial purposes   All illustrations and images included in CareNotes® are the copyrighted property of A D A M , Inc  or [address] [place] [address] [email or web address]

## 2022-07-21 NOTE — PROGRESS NOTES
Assessment and Plan:     Problem List Items Addressed This Visit    None     Visit Diagnoses     Medicare annual wellness visit, subsequent    -  Primary           Preventive health issues were discussed with patient, and age appropriate screening tests were ordered as noted in patient's After Visit Summary  Personalized health advice and appropriate referrals for health education or preventive services given if needed, as noted in patient's After Visit Summary       History of Present Illness:     Patient presents for a Medicare Wellness Visit    HPI   Patient Care Team:  Madina Washburn MD as PCP - General (Family Medicine)     Review of Systems:     Review of Systems     Problem List:     Patient Active Problem List   Diagnosis    Anxiety    Osteoarthritis    Type 2 diabetes mellitus with diabetic neuropathy, without long-term current use of insulin ([address] )    Hyperlipidemia    Hypertension    Hypothyroid    Microalbuminuria    Neuropathy    Vertigo    Anemia    Basal cell carcinoma (BCC) of neck    Stage 3a chronic kidney disease ([address] )      Past Medical and Surgical History:     Past Medical History:   Diagnosis Date    Abnormal mammogram 2008    Anemia 12/22/2020    Anxiety 5/15/2014    Basal cell carcinoma (BCC) of neck 6/23/2021    Carcinoid tumor     Diabetes mellitus ([address] )     Disease of thyroid gland     High cholesterol     Hyperlipidemia     Hypertension     Osteoarthritis 7/24/2008    Sciatica 2008    Stage 3a chronic kidney disease ([address] ) 1/12/2022     Past Surgical History:   Procedure Laterality Date    APPENDECTOMY      incidental finding at appendectomy    LUMBAR DISC SURGERY      TOTAL ABDOMINAL HYSTERECTOMY W/ BILATERAL SALPINGOOPHORECTOMY      benign    TUMOR REMOVAL      carcinoid tumor surgery      Family History:     Family History   Problem Relation Age of Onset    Liver cancer Mother     Hyperlipidemia Father     Heart attack Father     Heart disease Maternal Grandfather       Social History:     Social History     Socioeconomic History    Marital status: /Civil Union     Spouse name: None    Number of children: None    Years of education: None    Highest education level: None   Occupational History    Occupation: retired   Tobacco Use    Smoking status: Never Smoker    Smokeless tobacco: Never Used    Tobacco comment: no exposure to passive smoke   Vaping Use    Vaping Use: Never used   Substance and Sexual Activity    Alcohol use: No    Drug use: No    Sexual activity: None   Other Topics Concern    None   Social History Narrative    None     Social Determinants of Health     Financial Resource Strain: Not on file   Food Insecurity: Not on file   Transportation Needs: Not on file   Physical Activity: Not on file   Stress: Not on file   Social Connections: Not on file   Intimate Partner Violence: Not on file   Housing Stability: Not on file      Medications and Allergies:     Current Outpatient Medications   Medication Sig Dispense Refill    Blood Glucose Monitoring Suppl ([address]) w/Device KIT take by Subcutaneous route once      diazepam (VALIUM) 2 mg tablet Take one tablet every 6 hours as needed 30 tablet 4    glucose blood test strip tests once daily by fingerstick route      glyBURIDE micronized (GLYNASE) 3 mg tablet Take 1 tablet (3 mg total) by mouth daily with breakfast (Patient taking differently: Take 3 mg by mouth 2 (two) times a day with meals) 90 tablet 6    Lancets (ONETOUCH ULTRASOFT) lancets once daily by fingerstick route      levothyroxine 75 mcg tablet TAKE 1 TABLET EVERY DAY 90 tablet 3    lisinopril (ZESTRIL) 40 mg tablet TAKE 1 TABLET EVERY DAY 90 tablet 3    lovastatin (MEVACOR) 40 MG tablet TAKE 1 TABLET EVERY DAY 90 tablet 6    NIFEdipine ER (ADALAT CC) 60 MG 24 hr tablet TAKE 1 TABLET EVERY DAY 90 tablet 3    glucose blood test strip Test by fingerstick route daily 100 each 3    meclizine (ANTIVERT) 25 mg tablet Take 1 tablet (25 mg total) by mouth 3 (three) times a day as needed for dizziness 30 tablet 6    ondansetron (ZOFRAN-ODT) 4 mg disintegrating tablet Take 1 tablet (4 mg total) by mouth every 8 (eight) hours as needed for nausea or vomiting 30 tablet 1     No current facility-administered medications for this visit  Allergies   Allergen Reactions    Acetaminophen GI Intolerance    Codeine      Other reaction(s): GI problems    Hydrochlorothiazide      Other reaction(s): HYPONATREMIA    Hydrocodone GI Intolerance    Ibuprofen      Other reaction(s): GI    Oxycodone      Other reaction(s): GI      Immunizations:     Immunization History   Administered Date(s) Administered    H1N1, All Formulations 01/06/2010    INFLUENZA 09/08/2016, 09/30/2018, 09/26/2019, 10/02/2021    Influenza Split 09/15/2010, 09/03/2013, 09/19/2014, 09/08/2016    Influenza Split High Dose Preservative Free IM 09/30/2018    Influenza, high dose seasonal 0 7 mL 09/04/2020    Influenza, seasonal, injectable 11/23/2009, 09/09/2011, 09/01/2012, 09/14/2020    Pneumococcal Conjugate 13-Valent 06/08/2015    Pneumococcal Polysaccharide PPV23 12/24/2002    Tdap 03/08/2021    Zoster 11/24/2007    influenza, trivalent, adjuvanted 09/26/2019      Health Maintenance: There are no preventive care reminders to display for this patient  Topic Date Due    COVID-19 Vaccine (1) Never done    Influenza Vaccine (1) 09/01/2022      Medicare Screening Tests and Risk Assessments: Celia Scott is here for her Subsequent Wellness visit  Health Risk Assessment:   Patient rates overall health as very good  Patient feels that their physical health rating is same  Patient is satisfied with their life  Eyesight was rated as same  Hearing was rated as same  Patient feels that their emotional and mental health rating is much better  Patients states they are never, rarely angry   Pain experienced in the last 7 days has been some  Patient's pain rating has been 9/10  Patient states that she has experienced no weight loss or gain in last 6 months  Depression Screening:   PHQ-2 Score: 0      Fall Risk Screening: In the past year, patient has experienced: history of falling in past year    Number of falls: 1  Injured during fall?: Yes    Feels unsteady when standing or walking?: No    Worried about falling?: No      Urinary Incontinence Screening:   Patient has leaked urine accidently in the last six months  Home Safety:  Patient has trouble with stairs inside or outside of their home  Patient has working smoke alarms and has working carbon monoxide detector  Home safety hazards include: none  Nutrition:   Current diet is Regular  Medications:   Patient is currently taking over-the-counter supplements  OTC medications include: see medication list  Patient is able to manage medications  Activities of Daily Living (ADLs)/Instrumental Activities of Daily Living (IADLs):   Walk and transfer into and out of bed and chair?: Yes  Dress and groom yourself?: Yes    Bathe or shower yourself?: Yes    Feed yourself? Yes  Do your laundry/housekeeping?: Yes  Manage your money, pay your bills and track your expenses?: Yes  Make your own meals?: Yes    Do your own shopping?: Yes    Previous Hospitalizations:   Any hospitalizations or ED visits within the last 12 months?: No      Advance Care Planning:   Living will: Yes    Durable POA for healthcare:  Yes    Advanced directive: Yes      PREVENTIVE SCREENINGS      Cardiovascular Screening:    General: Screening Not Indicated and History Lipid Disorder      Diabetes Screening:     General: Screening Not Indicated and History Diabetes      Colorectal Cancer Screening:     General: Screening Not Indicated      Breast Cancer Screening:     General: Screening Not Indicated      Cervical Cancer Screening:    General: Screening Not Indicated      Osteoporosis Screening:    General: Screening Not Indicated      Abdominal Aortic Aneurysm (AAA) Screening:        General: Screening Not Indicated      Lung Cancer Screening:     General: Screening Not Indicated      Hepatitis C Screening:    General: Screening Not Indicated    Screening, Brief Intervention, and Referral to Treatment (SBIRT)    Screening  Typical number of drinks in a day: 0  Typical number of drinks in a week: 0  Interpretation: Low risk drinking behavior  AUDIT-C Screenin) How often did you have a drink containing alcohol in the past year? never  2) How many drinks did you have on a typical day when you were drinking in the past year? 0  3) How often did you have 6 or more drinks on one occasion in the past year? never    AUDIT-C Score: 0  Interpretation: Score 0-2 (female): Negative screen for alcohol misuse    Single Item Drug Screening:  How often have you used an illegal drug (including marijuana) or a prescription medication for non-medical reasons in the past year? never    Single Item Drug Screen Score: 0  Interpretation: Negative screen for possible drug use disorder    Other Counseling Topics:   Car/seat belt/driving safety, skin self-exam, sunscreen and calcium and vitamin D intake and regular weightbearing exercise       No exam data present     Physical Exam:     /60 (BP Location: Left arm, Patient Position: Sitting, Cuff Size: Large)   Pulse 95   Temp 98 °F (36 7 °C) (Temporal)   Resp 18   Ht 4' 9 5" (1 461 m)   Wt 59 5 kg (131 lb 3 2 oz)   SpO2 97%   BMI 27 90 kg/m²     Physical Exam     Isrrael Cisse MD

## 2022-07-21 NOTE — PROGRESS NOTES
Assessment and Plan:     Problem List Items Addressed This Visit        Endocrine    Type 2 diabetes mellitus with diabetic neuropathy, without long-term current use of insulin (HCC)    Relevant Orders    Hemoglobin M4G    Basic metabolic panel    TSH, 3rd generation    Hypothyroid       Cardiovascular and Mediastinum    Hypertension       Genitourinary    Stage 3a chronic kidney disease ([facility] )     Lab Results   Component Value Date    EGFR 34 01/26/2022    EGFR 36 06/23/2021    EGFR 34 11/23/2020    CREATININE 1 37 (H) 01/26/2022    CREATININE 1 31 (H) 06/23/2021    CREATININE 1 38 (H) 11/23/2020     Stable past few years ; reviewed no use NSAIDS            Other    Anemia (Chronic)    Hyperlipidemia    Microalbuminuria      Other Visit Diagnoses     Medicare annual wellness visit, subsequent    -  Primary           Preventive health issues were discussed with patient, and age appropriate screening tests were ordered as noted in patient's After Visit Summary  Personalized health advice and appropriate referrals for health education or preventive services given if needed, as noted in patient's After Visit Summary       History of Present Illness:     Patient presents for a Medicare Wellness Visit    HPI           SOME INCREASED l KNEE PAIN LATELY ;    EXAM C/W PES ANSERINE BURSITIS     INJECT TODAY     Large joint arthrocentesis: L anserine bursa  Universal Protocol:  Consent given by: patient  Site marked: the operative site was marked (PREPPED IN STERILE FASHION)  Supporting Documentation  Indications: pain   Procedure Details  Location: knee - L anserine bursa  Needle size: 25 G  Ultrasound guidance: no  Approach: anterolateral  Medications administered: 20 mg triamcinolone acetonide 40 mg/mL; 2 mL lidocaine 1 %    Dressing:  Sterile dressing applied          Patient Care Team:  Kia Cheng MD as PCP - General (Family Medicine)  Michelle Sanchez DPM (Podiatry)     Review of Systems:     Review of Systems Problem List:     Patient Active Problem List   Diagnosis    Anxiety    Osteoarthritis    Type 2 diabetes mellitus with diabetic neuropathy, without long-term current use of insulin ([facility] )    Hyperlipidemia    Hypertension    Hypothyroid    Microalbuminuria    Neuropathy    Vertigo    Anemia    Basal cell carcinoma (BCC) of neck    Stage 3a chronic kidney disease ([facility] )      Past Medical and Surgical History:     Past Medical History:   Diagnosis Date    Abnormal mammogram 2008    Anemia 12/22/2020    Anxiety 5/15/2014    Basal cell carcinoma (BCC) of neck 6/23/2021    Carcinoid tumor     Diabetes mellitus ([facility] )     Disease of thyroid gland     High cholesterol     Hyperlipidemia     Hypertension     Osteoarthritis 7/24/2008    Sciatica 2008    Stage 3a chronic kidney disease ([facility] ) 1/12/2022     Past Surgical History:   Procedure Laterality Date    APPENDECTOMY      incidental finding at appendectomy    LUMBAR DISC SURGERY      TOTAL ABDOMINAL HYSTERECTOMY W/ BILATERAL SALPINGOOPHORECTOMY      benign    TUMOR REMOVAL      carcinoid tumor surgery      Family History:     Family History   Problem Relation Age of Onset    Liver cancer Mother     Hyperlipidemia Father     Heart attack Father     Heart disease Maternal Grandfather       Social History:     Social History     Socioeconomic History    Marital status: /Civil Union     Spouse name: None    Number of children: None    Years of education: None    Highest education level: None   Occupational History    Occupation: retired   Tobacco Use    Smoking status: Never Smoker    Smokeless tobacco: Never Used    Tobacco comment: no exposure to passive smoke   Vaping Use    Vaping Use: Never used   Substance and Sexual Activity    Alcohol use: No    Drug use: No    Sexual activity: None   Other Topics Concern    None   Social History Narrative    None     Social Determinants of Health     Financial Resource Strain: Not on file   Food Insecurity: Not on file   Transportation Needs: Not on file   Physical Activity: Not on file   Stress: Not on file   Social Connections: Not on file   Intimate Partner Violence: Not on file   Housing Stability: Not on file      Medications and Allergies:     Current Outpatient Medications   Medication Sig Dispense Refill    Blood Glucose Monitoring Suppl ([address]) w/Device KIT take by Subcutaneous route once      diazepam (VALIUM) 2 mg tablet Take one tablet every 6 hours as needed 30 tablet 4    glucose blood test strip tests once daily by fingerstick route      glyBURIDE micronized (GLYNASE) 3 mg tablet Take 1 tablet (3 mg total) by mouth daily with breakfast (Patient taking differently: Take 3 mg by mouth 2 (two) times a day with meals) 90 tablet 6    Lancets (ONETOUCH ULTRASOFT) lancets once daily by fingerstick route      levothyroxine 75 mcg tablet TAKE 1 TABLET EVERY DAY 90 tablet 3    lisinopril (ZESTRIL) 40 mg tablet TAKE 1 TABLET EVERY DAY 90 tablet 3    lovastatin (MEVACOR) 40 MG tablet TAKE 1 TABLET EVERY DAY 90 tablet 6    NIFEdipine ER (ADALAT CC) 60 MG 24 hr tablet TAKE 1 TABLET EVERY DAY 90 tablet 3    meclizine (ANTIVERT) 25 mg tablet Take 1 tablet (25 mg total) by mouth 3 (three) times a day as needed for dizziness 30 tablet 6    ondansetron (ZOFRAN-ODT) 4 mg disintegrating tablet Take 1 tablet (4 mg total) by mouth every 8 (eight) hours as needed for nausea or vomiting 30 tablet 1     No current facility-administered medications for this visit       Allergies   Allergen Reactions    Acetaminophen GI Intolerance    Codeine      Other reaction(s): GI problems    Hydrochlorothiazide      Other reaction(s): HYPONATREMIA    Hydrocodone GI Intolerance    Ibuprofen      Other reaction(s): GI    Oxycodone      Other reaction(s): GI      Immunizations:     Immunization History   Administered Date(s) Administered    H1N1, All Formulations 01/06/2010    INFLUENZA 09/08/2016, 09/30/2018, 09/26/2019, 10/02/2021    Influenza Split 09/15/2010, 09/03/2013, 09/19/2014, 09/08/2016    Influenza Split High Dose Preservative Free IM 09/30/2018    Influenza, high dose seasonal 0 7 mL 09/04/2020    Influenza, seasonal, injectable 11/23/2009, 09/09/2011, 09/01/2012, 09/14/2020    Pneumococcal Conjugate 13-Valent 06/08/2015    Pneumococcal Polysaccharide PPV23 12/24/2002    Tdap 03/08/2021    Zoster 11/24/2007    influenza, trivalent, adjuvanted 09/26/2019      Health Maintenance: There are no preventive care reminders to display for this patient  Topic Date Due    COVID-19 Vaccine (1) Never done    Influenza Vaccine (1) 09/01/2022      Medicare Screening Tests and Risk Assessments: Skyla Finch is here for her Subsequent Wellness visit  Last Medicare Wellness visit information reviewed, patient interviewed, no change since last AWV  Depression Screening:   PHQ-2 Score: 0      Previous Hospitalizations:   Any hospitalizations or ED visits within the last 12 months?: No      Hospitalization Comments: Lives with family; Advance Care Planning:   Living will: Yes    Durable POA for healthcare:  Yes    Advanced directive: Yes      PREVENTIVE SCREENINGS      Cardiovascular Screening:    General: Screening Not Indicated and History Lipid Disorder      Diabetes Screening:     General: Screening Not Indicated and History Diabetes      Colorectal Cancer Screening:     General: Screening Not Indicated      Breast Cancer Screening:     General: Screening Not Indicated      Cervical Cancer Screening:    General: Screening Not Indicated      Osteoporosis Screening:    General: Screening Not Indicated      Abdominal Aortic Aneurysm (AAA) Screening:        General: Screening Not Indicated      Lung Cancer Screening:     General: Screening Not Indicated      Hepatitis C Screening:    General: Screening Not Indicated    Screening, Brief Intervention, and Referral to Treatment (SBIRT)    Screening      AUDIT-C Screenin) How often did you have a drink containing alcohol in the past year? never  2) How many drinks did you have on a typical day when you were drinking in the past year? 0  3) How often did you have 6 or more drinks on one occasion in the past year? never    AUDIT-C Score: 0  Interpretation: Score 0-2 (female): Negative screen for alcohol misuse    Brief Intervention  Alcohol & drug use screenings were reviewed  No concerns regarding substance use disorder identified       No exam data present     Physical Exam:     /60 (BP Location: Left arm, Patient Position: Sitting, Cuff Size: Large)   Pulse 95   Temp 98 °F (36 7 °C) (Temporal)   Resp 18   Ht 4' 9 5" (1 461 m)   Wt 59 5 kg (131 lb 3 2 oz)   SpO2 97%   BMI 27 90 kg/m²     Physical Exam     Madina Washburn MD

## 2022-07-28 DIAGNOSIS — E11.40 TYPE 2 DIABETES MELLITUS WITH DIABETIC NEUROPATHY, WITHOUT LONG-TERM CURRENT USE OF INSULIN (HCC): Primary | ICD-10-CM

## 2022-07-29 RX ORDER — LANCETS
EACH MISCELLANEOUS DAILY
Qty: 100 EACH | Refills: 3 | Status: SHIPPED | OUTPATIENT
Start: 2022-07-29

## 2022-08-04 ENCOUNTER — APPOINTMENT (OUTPATIENT)
Dept: LAB | Facility: CLINIC | Age: 87
End: 2022-08-04
Payer: MEDICARE

## 2022-08-04 DIAGNOSIS — E11.40 TYPE 2 DIABETES MELLITUS WITH DIABETIC NEUROPATHY, WITHOUT LONG-TERM CURRENT USE OF INSULIN (HCC): ICD-10-CM

## 2022-08-04 LAB
ANION GAP SERPL CALCULATED.3IONS-SCNC: 7 MMOL/L (ref 4–13)
BUN SERPL-MCNC: 26 MG/DL (ref 5–25)
CALCIUM SERPL-MCNC: 9.6 MG/DL (ref 8.3–10.1)
CHLORIDE SERPL-SCNC: 108 MMOL/L (ref 96–108)
CO2 SERPL-SCNC: 23 MMOL/L (ref 21–32)
CREAT SERPL-MCNC: 1.37 MG/DL (ref 0.6–1.3)
GFR SERPL CREATININE-BSD FRML MDRD: 34 ML/MIN/1.73SQ M
GLUCOSE P FAST SERPL-MCNC: 178 MG/DL (ref 65–99)
POTASSIUM SERPL-SCNC: 4.8 MMOL/L (ref 3.5–5.3)
SODIUM SERPL-SCNC: 138 MMOL/L (ref 135–147)
TSH SERPL DL<=0.05 MIU/L-ACNC: 2.98 UIU/ML (ref 0.45–4.5)

## 2022-08-04 PROCEDURE — 83036 HEMOGLOBIN GLYCOSYLATED A1C: CPT

## 2022-08-04 PROCEDURE — 84443 ASSAY THYROID STIM HORMONE: CPT

## 2022-08-04 PROCEDURE — 36415 COLL VENOUS BLD VENIPUNCTURE: CPT

## 2022-08-04 PROCEDURE — 80048 BASIC METABOLIC PNL TOTAL CA: CPT

## 2022-08-05 LAB
EST. AVERAGE GLUCOSE BLD GHB EST-MCNC: 235 MG/DL
HBA1C MFR BLD: 9.8 %

## 2022-08-06 ENCOUNTER — TRANSCRIBE ORDERS (OUTPATIENT)
Dept: FAMILY MEDICINE CLINIC | Facility: CLINIC | Age: 87
End: 2022-08-06

## 2022-08-06 DIAGNOSIS — E11.40 TYPE 2 DIABETES MELLITUS WITH DIABETIC NEUROPATHY, WITHOUT LONG-TERM CURRENT USE OF INSULIN (HCC): Primary | ICD-10-CM

## 2022-08-08 ENCOUNTER — TREATMENT (OUTPATIENT)
Dept: FAMILY MEDICINE CLINIC | Facility: CLINIC | Age: 87
End: 2022-08-08

## 2022-08-08 DIAGNOSIS — E11.40 TYPE 2 DIABETES MELLITUS WITH DIABETIC NEUROPATHY, WITHOUT LONG-TERM CURRENT USE OF INSULIN (HCC): Primary | ICD-10-CM

## 2022-08-08 RX ORDER — PIOGLITAZONEHYDROCHLORIDE 15 MG/1
15 TABLET ORAL DAILY
Qty: 90 TABLET | Refills: 6 | Status: SHIPPED | OUTPATIENT
Start: 2022-08-08 | End: 2022-10-19 | Stop reason: SDUPTHER

## 2022-10-19 DIAGNOSIS — E11.40 TYPE 2 DIABETES MELLITUS WITH DIABETIC NEUROPATHY, WITHOUT LONG-TERM CURRENT USE OF INSULIN (HCC): ICD-10-CM

## 2022-10-19 RX ORDER — PIOGLITAZONEHYDROCHLORIDE 15 MG/1
15 TABLET ORAL DAILY
Qty: 90 TABLET | Refills: 6 | Status: SHIPPED | OUTPATIENT
Start: 2022-10-19

## 2022-10-25 NOTE — TELEPHONE ENCOUNTER
Patient called to ask a question about her medication Actos. Would like to speak to Dr. Serna about this, understands may receive a call next week.

## 2022-10-27 ENCOUNTER — TELEPHONE (OUTPATIENT)
Dept: FAMILY MEDICINE CLINIC | Facility: CLINIC | Age: 87
End: 2022-10-27

## 2022-10-31 ENCOUNTER — TRANSCRIBE ORDERS (OUTPATIENT)
Dept: FAMILY MEDICINE CLINIC | Facility: CLINIC | Age: 87
End: 2022-10-31

## 2022-10-31 DIAGNOSIS — E11.9 TYPE 2 DIABETES MELLITUS WITHOUT COMPLICATION, UNSPECIFIED WHETHER LONG TERM INSULIN USE (HCC): ICD-10-CM

## 2022-10-31 RX ORDER — GLYBURIDE 3 MG/1
TABLET ORAL
Qty: 145 TABLET | Refills: 6 | Status: SHIPPED | OUTPATIENT
Start: 2022-10-31 | End: 2022-11-03

## 2022-11-03 DIAGNOSIS — E11.9 TYPE 2 DIABETES MELLITUS WITHOUT COMPLICATION, UNSPECIFIED WHETHER LONG TERM INSULIN USE (HCC): ICD-10-CM

## 2022-11-03 RX ORDER — GLYBURIDE 3 MG/1
TABLET ORAL
Qty: 270 TABLET | Refills: 7 | Status: SHIPPED | OUTPATIENT
Start: 2022-11-03 | End: 2022-11-10 | Stop reason: SDUPTHER

## 2022-11-09 ENCOUNTER — TELEPHONE (OUTPATIENT)
Dept: FAMILY MEDICINE CLINIC | Facility: CLINIC | Age: 87
End: 2022-11-09

## 2022-11-09 NOTE — TELEPHONE ENCOUNTER
Pharmacy calling to clarify instructions on Glyburide Micronized ( Glynase ) the sig says unknown, Please advise

## 2022-11-10 ENCOUNTER — TELEPHONE (OUTPATIENT)
Dept: FAMILY MEDICINE CLINIC | Facility: CLINIC | Age: 87
End: 2022-11-10

## 2022-11-10 ENCOUNTER — TRANSCRIBE ORDERS (OUTPATIENT)
Dept: FAMILY MEDICINE CLINIC | Facility: CLINIC | Age: 87
End: 2022-11-10

## 2022-11-10 DIAGNOSIS — E11.9 TYPE 2 DIABETES MELLITUS WITHOUT COMPLICATION, UNSPECIFIED WHETHER LONG TERM INSULIN USE (HCC): ICD-10-CM

## 2022-11-10 RX ORDER — GLYBURIDE 3 MG/1
TABLET ORAL
Qty: 270 TABLET | Refills: 7 | Status: SHIPPED | OUTPATIENT
Start: 2022-11-10

## 2022-11-10 RX ORDER — GLYBURIDE 3 MG/1
TABLET ORAL
Qty: 270 TABLET | Refills: 7 | Status: SHIPPED | OUTPATIENT
Start: 2022-11-10 | End: 2022-11-10 | Stop reason: SDUPTHER

## 2022-11-10 NOTE — TELEPHONE ENCOUNTER
Pharmacy called they need to know if medication glyburide needs to be 3 times a day or if she is taking 3 mg daily  They need a new medication script send to the [address] [place] [address]

## 2022-11-28 DIAGNOSIS — E78.5 HYPERLIPIDEMIA, UNSPECIFIED HYPERLIPIDEMIA TYPE: ICD-10-CM

## 2022-11-29 RX ORDER — LOVASTATIN 40 MG/1
TABLET ORAL
Qty: 90 TABLET | Refills: 6 | Status: SHIPPED | OUTPATIENT
Start: 2022-11-29

## 2023-01-01 ENCOUNTER — PATIENT OUTREACH (OUTPATIENT)
Dept: CASE MANAGEMENT | Facility: OTHER | Age: 88
End: 2023-01-01

## 2023-01-01 DIAGNOSIS — N17.9 AKI (ACUTE KIDNEY INJURY) (HCC): Primary | ICD-10-CM

## 2023-01-26 ENCOUNTER — OFFICE VISIT (OUTPATIENT)
Dept: FAMILY MEDICINE CLINIC | Facility: CLINIC | Age: 88
End: 2023-01-26

## 2023-01-26 ENCOUNTER — TELEPHONE (OUTPATIENT)
Dept: FAMILY MEDICINE CLINIC | Facility: CLINIC | Age: 88
End: 2023-01-26

## 2023-01-26 ENCOUNTER — APPOINTMENT (OUTPATIENT)
Dept: LAB | Facility: CLINIC | Age: 88
End: 2023-01-26

## 2023-01-26 VITALS
TEMPERATURE: 96.6 F | WEIGHT: 134.6 LBS | BODY MASS INDEX: 28.25 KG/M2 | HEIGHT: 58 IN | OXYGEN SATURATION: 97 % | HEART RATE: 96 BPM | SYSTOLIC BLOOD PRESSURE: 122 MMHG | DIASTOLIC BLOOD PRESSURE: 62 MMHG

## 2023-01-26 DIAGNOSIS — D64.9 ANEMIA, UNSPECIFIED TYPE: Chronic | ICD-10-CM

## 2023-01-26 DIAGNOSIS — I10 PRIMARY HYPERTENSION: ICD-10-CM

## 2023-01-26 DIAGNOSIS — M79.604 PAIN IN BOTH LOWER EXTREMITIES: Primary | ICD-10-CM

## 2023-01-26 DIAGNOSIS — R11.0 NAUSEA: ICD-10-CM

## 2023-01-26 DIAGNOSIS — N18.31 STAGE 3A CHRONIC KIDNEY DISEASE (HCC): ICD-10-CM

## 2023-01-26 DIAGNOSIS — R80.9 MICROALBUMINURIA: ICD-10-CM

## 2023-01-26 DIAGNOSIS — E11.40 TYPE 2 DIABETES MELLITUS WITH DIABETIC NEUROPATHY, WITHOUT LONG-TERM CURRENT USE OF INSULIN (HCC): ICD-10-CM

## 2023-01-26 DIAGNOSIS — F41.9 ANXIETY: ICD-10-CM

## 2023-01-26 DIAGNOSIS — Z23 ENCOUNTER FOR IMMUNIZATION: ICD-10-CM

## 2023-01-26 DIAGNOSIS — E03.9 HYPOTHYROIDISM, UNSPECIFIED TYPE: Primary | ICD-10-CM

## 2023-01-26 DIAGNOSIS — M79.605 PAIN IN BOTH LOWER EXTREMITIES: Primary | ICD-10-CM

## 2023-01-26 LAB
ALBUMIN SERPL BCP-MCNC: 4.2 G/DL (ref 3.5–5)
ALP SERPL-CCNC: 76 U/L (ref 46–116)
ALT SERPL W P-5'-P-CCNC: 20 U/L (ref 12–78)
ANION GAP SERPL CALCULATED.3IONS-SCNC: 7 MMOL/L (ref 4–13)
AST SERPL W P-5'-P-CCNC: 17 U/L (ref 5–45)
BASOPHILS # BLD AUTO: 0.06 THOUSANDS/ÂΜL (ref 0–0.1)
BASOPHILS NFR BLD AUTO: 1 % (ref 0–1)
BILIRUB SERPL-MCNC: 0.46 MG/DL (ref 0.2–1)
BUN SERPL-MCNC: 30 MG/DL (ref 5–25)
CALCIUM SERPL-MCNC: 9.1 MG/DL (ref 8.3–10.1)
CHLORIDE SERPL-SCNC: 110 MMOL/L (ref 96–108)
CO2 SERPL-SCNC: 21 MMOL/L (ref 21–32)
CREAT SERPL-MCNC: 1.45 MG/DL (ref 0.6–1.3)
EOSINOPHIL # BLD AUTO: 0.03 THOUSAND/ÂΜL (ref 0–0.61)
EOSINOPHIL NFR BLD AUTO: 0 % (ref 0–6)
ERYTHROCYTE [DISTWIDTH] IN BLOOD BY AUTOMATED COUNT: 12.2 % (ref 11.6–15.1)
EST. AVERAGE GLUCOSE BLD GHB EST-MCNC: 203 MG/DL
GFR SERPL CREATININE-BSD FRML MDRD: 31 ML/MIN/1.73SQ M
GLUCOSE SERPL-MCNC: 218 MG/DL (ref 65–140)
HBA1C MFR BLD: 8.7 %
HCT VFR BLD AUTO: 39.9 % (ref 34.8–46.1)
HGB BLD-MCNC: 12.2 G/DL (ref 11.5–15.4)
IMM GRANULOCYTES # BLD AUTO: 0.03 THOUSAND/UL (ref 0–0.2)
IMM GRANULOCYTES NFR BLD AUTO: 0 % (ref 0–2)
LYMPHOCYTES # BLD AUTO: 2.26 THOUSANDS/ÂΜL (ref 0.6–4.47)
LYMPHOCYTES NFR BLD AUTO: 33 % (ref 14–44)
MCH RBC QN AUTO: 30 PG (ref 26.8–34.3)
MCHC RBC AUTO-ENTMCNC: 30.6 G/DL (ref 31.4–37.4)
MCV RBC AUTO: 98 FL (ref 82–98)
MONOCYTES # BLD AUTO: 0.44 THOUSAND/ÂΜL (ref 0.17–1.22)
MONOCYTES NFR BLD AUTO: 6 % (ref 4–12)
NEUTROPHILS # BLD AUTO: 4.09 THOUSANDS/ÂΜL (ref 1.85–7.62)
NEUTS SEG NFR BLD AUTO: 60 % (ref 43–75)
NRBC BLD AUTO-RTO: 0 /100 WBCS
PLATELET # BLD AUTO: 187 THOUSANDS/UL (ref 149–390)
PMV BLD AUTO: 11.8 FL (ref 8.9–12.7)
POTASSIUM SERPL-SCNC: 4.2 MMOL/L (ref 3.5–5.3)
PROT SERPL-MCNC: 7.3 G/DL (ref 6.4–8.4)
RBC # BLD AUTO: 4.07 MILLION/UL (ref 3.81–5.12)
SODIUM SERPL-SCNC: 138 MMOL/L (ref 135–147)
TSH SERPL DL<=0.05 MIU/L-ACNC: 18.1 UIU/ML (ref 0.45–4.5)
WBC # BLD AUTO: 6.91 THOUSAND/UL (ref 4.31–10.16)

## 2023-01-26 RX ORDER — FUROSEMIDE 20 MG/1
20 TABLET ORAL DAILY
Qty: 21 TABLET | Refills: 1 | Status: SHIPPED | OUTPATIENT
Start: 2023-01-26

## 2023-01-26 RX ORDER — ONDANSETRON 4 MG/1
4 TABLET, ORALLY DISINTEGRATING ORAL EVERY 8 HOURS PRN
Qty: 30 TABLET | Refills: 1 | Status: SHIPPED | OUTPATIENT
Start: 2023-01-26

## 2023-01-26 RX ORDER — DIAZEPAM 2 MG/1
TABLET ORAL
Qty: 30 TABLET | Refills: 4 | Status: SHIPPED | OUTPATIENT
Start: 2023-01-26

## 2023-01-26 RX ORDER — FUROSEMIDE 20 MG/1
TABLET ORAL
Qty: 90 TABLET | OUTPATIENT
Start: 2023-01-26

## 2023-01-26 NOTE — TELEPHONE ENCOUNTER
Patient's daughter mentioned the physical therapy order should have been put in as an at home PT order   Please sign the attached order for home health PT

## 2023-01-26 NOTE — PATIENT INSTRUCTIONS
MEASURES THAT HELP PREVENT FALLS:    DRINK PLENTY OF FLUIDS : 2-3 QTS PER DAY : URINE SHOULD BE LIGHT COLOR  GET REGULAR EYE CHECK UP  USE NIGHT LIGHTS  ELIMINATE ALL THROW RUGS  DO SOME WALKING EVERY DAY AND BALANCE EXERCISES  USE NON SLIP FOOT WEARBATH MATS AND GRAB BARS HELP  WHEN RISING: ALWAYS WAIT FOR 15-20 SECONDS BEFORE INITIATING WALK  CONSIDER DOING [facility] OR [facility]       Call me in 3 weeks if the fluid does not get somewhat better  May not totally resolve but it should improve somewhat  If it continues to come back let me know  No

## 2023-01-26 NOTE — PROGRESS NOTES
Assessment/Plan:    No problem-specific Assessment & Plan notes found for this encounter  Diagnoses and all orders for this visit:    Hypothyroidism, unspecified type    Type 2 diabetes mellitus with diabetic neuropathy, without long-term current use of insulin ([facility] )    Encounter for immunization    Anxiety    Nausea    Stage 3a chronic kidney disease ([facility] )    Primary hypertension    Anemia, unspecified type    Microalbuminuria          Subjective:      Patient ID: Ana Paiz is a 80 y o  female  PATIENT RETURNS FOR FOLLOW-UP OF CHRONIC MEDICAL CONDITIONS  ANY HOSPITAL VISITS, EMERGENCY VISITS AND OTHER PROVIDER VISITS SINCE LAST TIME WERE REVIEWED  MEDS WERE REVIEWED AND NO SIDE EFFECTS  NO NEW ISSUES  UNLESS NOTED BELOW  NO NEW MEDICAL PROVIDER REPORTED  THE CHRONIC DISEASES LISTED ABOVE ARE STABLE AND UNCHANGED/ THE PLAN OF CARE FOR THOSE WILL REMAIN UNCHANGED UNLESS NOTED BELOW  The following portions of the patient's history were reviewed and updated as appropriate: allergies, current medications, past family history, past medical history, past social history, past surgical history and problem list     Review of Systems   Constitutional: Negative for activity change and appetite change  HENT: Negative for voice change  Eyes: Negative for visual disturbance  Respiratory: Negative for chest tightness and shortness of breath  Cardiovascular: Positive for leg swelling  Negative for chest pain and palpitations  Patient reports leg and ankle edema bilateral since being on Actos for 4 to 5 weeks  Since stopping it it does not seem to have improved much  She did gain 5 pounds  She reports occasionally if she is a little more short of breath  No history of CHF  Gastrointestinal: Negative for abdominal pain, blood in stool, constipation and diarrhea  Genitourinary: Negative for dysuria, vaginal bleeding and vaginal discharge  Skin: Negative for rash     Neurological: Negative for dizziness  Psychiatric/Behavioral: Negative for dysphoric mood  Objective:  Vitals:    01/26/23 1104   BP: 122/62   BP Location: Left arm   Patient Position: Sitting   Cuff Size: Adult   Pulse: 96   Temp: (!) 96 6 °F (35 9 °C)   SpO2: 97%   Weight: 61 1 kg (134 lb 9 6 oz)   Height: 4' 9 5" (1 461 m)      Physical Exam  Constitutional:       Appearance: She is well-developed  HENT:      Head: Normocephalic and atraumatic  Eyes:      Conjunctiva/sclera: Conjunctivae normal    Neck:      Thyroid: No thyromegaly  Cardiovascular:      Rate and Rhythm: Normal rate and regular rhythm  Heart sounds: Normal heart sounds  No murmur heard  Comments: +2 -3 edema legs ble  Pulmonary:      Effort: Pulmonary effort is normal  No respiratory distress  Breath sounds: Normal breath sounds  Musculoskeletal:      Cervical back: Neck supple  Lymphadenopathy:      Cervical: No cervical adenopathy  Skin:     General: Skin is warm and dry  Psychiatric:         Behavior: Behavior normal            Patient's chronic problems that were reviewed today are stable  Recent hospital stays reviewed  Recent labs and imaging reviewed  Recent visits to other providers reviewed  Meds reviewed and no changes made  Appropriate labs and imaging were ordered  Preventive measures appropriate for age and sex were reviewed with patient  Immunizations were updated as appropriate  The patient's edema seems to improve overnight  This suggests may be vascular insufficiency rather than fluid overload  We will give a small dose of diuretic for 3 to 4 weeks to see if we can ameliorate this is already elevating the legs and watching her salt intake

## 2023-01-26 NOTE — TELEPHONE ENCOUNTER
----- Message from Wandy Simmons MD sent at 1/26/2023  2:22 PM EST -----  Call patient  Home physical therapy not possible due to high volumes at this time    I would be happy to refer her to a facility for therapy for 3 to 4 weeks if she is interested let me know

## 2023-01-30 ENCOUNTER — TELEPHONE (OUTPATIENT)
Dept: FAMILY MEDICINE CLINIC | Facility: CLINIC | Age: 88
End: 2023-01-30

## 2023-01-30 NOTE — TELEPHONE ENCOUNTER
----- Message from Rip Runner, MD sent at 1/27/2023 10:21 AM EST -----  DM has improved ; no chnages for now   ----- Message -----  From: Lab, Background User  Sent: 1/26/2023   5:27 PM EST  To: Rip Runner, MD

## 2023-01-31 ENCOUNTER — TRANSCRIBE ORDERS (OUTPATIENT)
Dept: FAMILY MEDICINE CLINIC | Facility: CLINIC | Age: 88
End: 2023-01-31

## 2023-01-31 DIAGNOSIS — E03.9 HYPOTHYROIDISM, UNSPECIFIED TYPE: Primary | ICD-10-CM

## 2023-01-31 RX ORDER — LEVOTHYROXINE SODIUM 88 UG/1
88 TABLET ORAL DAILY
Qty: 90 TABLET | Refills: 3 | Status: SHIPPED | OUTPATIENT
Start: 2023-01-31

## 2023-02-01 ENCOUNTER — APPOINTMENT (OUTPATIENT)
Dept: LAB | Facility: CLINIC | Age: 88
End: 2023-02-01

## 2023-02-01 LAB
CREAT UR-MCNC: 60.1 MG/DL
MICROALBUMIN UR-MCNC: 569 MG/L (ref 0–20)
MICROALBUMIN/CREAT 24H UR: 947 MG/G CREATININE (ref 0–30)

## 2023-02-03 ENCOUNTER — TELEPHONE (OUTPATIENT)
Dept: FAMILY MEDICINE CLINIC | Facility: CLINIC | Age: 88
End: 2023-02-03

## 2023-02-03 NOTE — TELEPHONE ENCOUNTER
----- Message from Anna Sun MD sent at 2/2/2023  9:27 AM EST -----  Call patient  Recent urine test shows some protein  This should not be a problem in the future however I would recommend that she use reminders to stay well-hydrated and is well not take any medicines with potentially kidney damaging effects  That would include Advil Motrin Aleve and ibuprofen over-the-counter    She is okay to take Tylenol as needed

## 2023-02-17 ENCOUNTER — APPOINTMENT (INPATIENT)
Dept: RADIOLOGY | Facility: HOSPITAL | Age: 88
End: 2023-02-17

## 2023-02-17 ENCOUNTER — APPOINTMENT (EMERGENCY)
Dept: CT IMAGING | Facility: HOSPITAL | Age: 88
End: 2023-02-17

## 2023-02-17 ENCOUNTER — HOSPITAL ENCOUNTER (INPATIENT)
Facility: HOSPITAL | Age: 88
LOS: 7 days | Discharge: NURSING, REHAB OR LONG-TERM CARE | End: 2023-02-24
Attending: EMERGENCY MEDICINE | Admitting: INTERNAL MEDICINE

## 2023-02-17 DIAGNOSIS — I50.9 CHF (CONGESTIVE HEART FAILURE) (HCC): ICD-10-CM

## 2023-02-17 DIAGNOSIS — R91.8 RIGHT LOWER LOBE PULMONARY INFILTRATE: ICD-10-CM

## 2023-02-17 DIAGNOSIS — A41.9 SEPSIS (HCC): ICD-10-CM

## 2023-02-17 DIAGNOSIS — K52.89 STERCORAL COLITIS: ICD-10-CM

## 2023-02-17 DIAGNOSIS — J90 PLEURAL EFFUSION: ICD-10-CM

## 2023-02-17 DIAGNOSIS — N17.9 AKI (ACUTE KIDNEY INJURY) (HCC): ICD-10-CM

## 2023-02-17 DIAGNOSIS — R19.7 DIARRHEA: Primary | ICD-10-CM

## 2023-02-17 PROBLEM — J18.9 PNEUMONIA: Status: ACTIVE | Noted: 2023-01-01

## 2023-02-17 PROBLEM — R60.0 LOWER EXTREMITY EDEMA: Status: ACTIVE | Noted: 2023-01-01

## 2023-02-17 LAB
ALBUMIN SERPL BCP-MCNC: 3.8 G/DL (ref 3.5–5)
ALP SERPL-CCNC: 64 U/L (ref 34–104)
ALT SERPL W P-5'-P-CCNC: 15 U/L (ref 7–52)
ANION GAP SERPL CALCULATED.3IONS-SCNC: 9 MMOL/L (ref 4–13)
APTT PPP: 33 SECONDS (ref 23–37)
AST SERPL W P-5'-P-CCNC: 18 U/L (ref 13–39)
BASOPHILS # BLD AUTO: 0.05 THOUSANDS/ÂΜL (ref 0–0.1)
BASOPHILS NFR BLD AUTO: 0 % (ref 0–1)
BILIRUB SERPL-MCNC: 0.48 MG/DL (ref 0.2–1)
BUN SERPL-MCNC: 50 MG/DL (ref 5–25)
CALCIUM SERPL-MCNC: 8.9 MG/DL (ref 8.4–10.2)
CHLORIDE SERPL-SCNC: 102 MMOL/L (ref 96–108)
CO2 SERPL-SCNC: 23 MMOL/L (ref 21–32)
CREAT SERPL-MCNC: 2.39 MG/DL (ref 0.6–1.3)
EOSINOPHIL # BLD AUTO: 0 THOUSAND/ÂΜL (ref 0–0.61)
EOSINOPHIL NFR BLD AUTO: 0 % (ref 0–6)
ERYTHROCYTE [DISTWIDTH] IN BLOOD BY AUTOMATED COUNT: 13 % (ref 11.6–15.1)
FLUAV RNA RESP QL NAA+PROBE: NEGATIVE
FLUBV RNA RESP QL NAA+PROBE: NEGATIVE
GFR SERPL CREATININE-BSD FRML MDRD: 17 ML/MIN/1.73SQ M
GLUCOSE SERPL-MCNC: 131 MG/DL (ref 65–140)
GLUCOSE SERPL-MCNC: 184 MG/DL (ref 65–140)
GLUCOSE SERPL-MCNC: 210 MG/DL (ref 65–140)
HCT VFR BLD AUTO: 36.1 % (ref 34.8–46.1)
HGB BLD-MCNC: 11.3 G/DL (ref 11.5–15.4)
IMM GRANULOCYTES # BLD AUTO: 0.22 THOUSAND/UL (ref 0–0.2)
IMM GRANULOCYTES NFR BLD AUTO: 1 % (ref 0–2)
INR PPP: 1.34 (ref 0.84–1.19)
LACTATE SERPL-SCNC: 1.2 MMOL/L (ref 0.5–2)
LIPASE SERPL-CCNC: 10 U/L (ref 11–82)
LYMPHOCYTES # BLD AUTO: 1.7 THOUSANDS/ÂΜL (ref 0.6–4.47)
LYMPHOCYTES NFR BLD AUTO: 7 % (ref 14–44)
MCH RBC QN AUTO: 30 PG (ref 26.8–34.3)
MCHC RBC AUTO-ENTMCNC: 31.3 G/DL (ref 31.4–37.4)
MCV RBC AUTO: 96 FL (ref 82–98)
MONOCYTES # BLD AUTO: 1.14 THOUSAND/ÂΜL (ref 0.17–1.22)
MONOCYTES NFR BLD AUTO: 5 % (ref 4–12)
NEUTROPHILS # BLD AUTO: 20.13 THOUSANDS/ÂΜL (ref 1.85–7.62)
NEUTS SEG NFR BLD AUTO: 87 % (ref 43–75)
NRBC BLD AUTO-RTO: 0 /100 WBCS
PLATELET # BLD AUTO: 187 THOUSANDS/UL (ref 149–390)
PMV BLD AUTO: 11.2 FL (ref 8.9–12.7)
POTASSIUM SERPL-SCNC: 4.8 MMOL/L (ref 3.5–5.3)
PROCALCITONIN SERPL-MCNC: 64.18 NG/ML
PROT SERPL-MCNC: 6.7 G/DL (ref 6.4–8.4)
PROTHROMBIN TIME: 16.5 SECONDS (ref 11.6–14.5)
RBC # BLD AUTO: 3.77 MILLION/UL (ref 3.81–5.12)
RSV RNA RESP QL NAA+PROBE: NEGATIVE
SARS-COV-2 RNA RESP QL NAA+PROBE: NEGATIVE
SODIUM SERPL-SCNC: 134 MMOL/L (ref 135–147)
TSH SERPL DL<=0.05 MIU/L-ACNC: 21.14 UIU/ML (ref 0.45–4.5)
WBC # BLD AUTO: 23.24 THOUSAND/UL (ref 4.31–10.16)

## 2023-02-17 RX ORDER — POLYETHYLENE GLYCOL 3350 17 G/17G
17 POWDER, FOR SOLUTION ORAL DAILY
Status: DISCONTINUED | OUTPATIENT
Start: 2023-02-18 | End: 2023-02-24 | Stop reason: HOSPADM

## 2023-02-17 RX ORDER — METRONIDAZOLE 500 MG/100ML
500 INJECTION, SOLUTION INTRAVENOUS EVERY 8 HOURS
Status: DISCONTINUED | OUTPATIENT
Start: 2023-02-17 | End: 2023-02-21

## 2023-02-17 RX ORDER — CEFTRIAXONE 2 G/50ML
2000 INJECTION, SOLUTION INTRAVENOUS ONCE
Status: COMPLETED | OUTPATIENT
Start: 2023-02-17 | End: 2023-02-17

## 2023-02-17 RX ORDER — CEFTRIAXONE 1 G/50ML
1000 INJECTION, SOLUTION INTRAVENOUS EVERY 24 HOURS
Status: DISCONTINUED | OUTPATIENT
Start: 2023-02-18 | End: 2023-02-23

## 2023-02-17 RX ORDER — POLYETHYLENE GLYCOL 3350 17 G/17G
17 POWDER, FOR SOLUTION ORAL ONCE
Status: COMPLETED | OUTPATIENT
Start: 2023-02-17 | End: 2023-02-17

## 2023-02-17 RX ORDER — PRAVASTATIN SODIUM 40 MG
40 TABLET ORAL
Status: DISCONTINUED | OUTPATIENT
Start: 2023-02-17 | End: 2023-02-24 | Stop reason: HOSPADM

## 2023-02-17 RX ORDER — SENNOSIDES 8.6 MG
1 TABLET ORAL DAILY
Status: DISCONTINUED | OUTPATIENT
Start: 2023-02-18 | End: 2023-02-24 | Stop reason: HOSPADM

## 2023-02-17 RX ORDER — HEPARIN SODIUM 5000 [USP'U]/ML
5000 INJECTION, SOLUTION INTRAVENOUS; SUBCUTANEOUS EVERY 8 HOURS SCHEDULED
Status: DISCONTINUED | OUTPATIENT
Start: 2023-02-17 | End: 2023-02-24 | Stop reason: HOSPADM

## 2023-02-17 RX ORDER — DIAZEPAM 2 MG/1
2 TABLET ORAL EVERY 8 HOURS PRN
Status: DISCONTINUED | OUTPATIENT
Start: 2023-02-17 | End: 2023-02-24 | Stop reason: HOSPADM

## 2023-02-17 RX ORDER — NIFEDIPINE 60 MG/1
60 TABLET, EXTENDED RELEASE ORAL DAILY
Status: DISCONTINUED | OUTPATIENT
Start: 2023-02-18 | End: 2023-02-19

## 2023-02-17 RX ORDER — ACETAMINOPHEN 325 MG/1
650 TABLET ORAL EVERY 6 HOURS PRN
Status: DISCONTINUED | OUTPATIENT
Start: 2023-02-17 | End: 2023-02-24 | Stop reason: HOSPADM

## 2023-02-17 RX ORDER — LEVOTHYROXINE SODIUM 88 UG/1
88 TABLET ORAL
Status: DISCONTINUED | OUTPATIENT
Start: 2023-02-18 | End: 2023-02-19

## 2023-02-17 RX ORDER — ONDANSETRON 2 MG/ML
4 INJECTION INTRAMUSCULAR; INTRAVENOUS EVERY 6 HOURS PRN
Status: DISCONTINUED | OUTPATIENT
Start: 2023-02-17 | End: 2023-02-24 | Stop reason: HOSPADM

## 2023-02-17 RX ORDER — DOCUSATE SODIUM 100 MG/1
100 CAPSULE, LIQUID FILLED ORAL 2 TIMES DAILY
Status: DISCONTINUED | OUTPATIENT
Start: 2023-02-17 | End: 2023-02-24 | Stop reason: HOSPADM

## 2023-02-17 RX ADMIN — HEPARIN SODIUM 5000 UNITS: 5000 INJECTION INTRAVENOUS; SUBCUTANEOUS at 18:11

## 2023-02-17 RX ADMIN — SODIUM CHLORIDE 1000 ML: 0.9 INJECTION, SOLUTION INTRAVENOUS at 12:20

## 2023-02-17 RX ADMIN — METRONIDAZOLE 500 MG: 500 INJECTION, SOLUTION INTRAVENOUS at 18:09

## 2023-02-17 RX ADMIN — CEFTRIAXONE 2000 MG: 2 INJECTION, SOLUTION INTRAVENOUS at 14:52

## 2023-02-17 RX ADMIN — DOCUSATE SODIUM 100 MG: 100 CAPSULE, LIQUID FILLED ORAL at 18:09

## 2023-02-17 RX ADMIN — POLYETHYLENE GLYCOL 3350 17 G: 17 POWDER, FOR SOLUTION ORAL at 16:57

## 2023-02-17 RX ADMIN — PRAVASTATIN SODIUM 40 MG: 40 TABLET ORAL at 18:09

## 2023-02-17 NOTE — ASSESSMENT & PLAN NOTE
· Likely prerenal secondary to GI losses and poor p o  intake  · Hold lisinopril and Lasix  · Encourage oral intake

## 2023-02-17 NOTE — PLAN OF CARE
Problem: Potential for Falls  Goal: Patient will remain free of falls  Description: INTERVENTIONS:  - Educate patient/family on patient safety including physical limitations  - Instruct patient to call for assistance with activity   - Consult OT/PT to assist with strengthening/mobility   - Keep Call bell within reach  - Keep bed low and locked with side rails adjusted as appropriate  - Keep care items and personal belongings within reach  - Initiate and maintain comfort rounds  - Make Fall Risk Sign visible to staff  - Offer Toileting every 2 Hours, in advance of need  - Initiate/Maintain bed alarm  - Obtain necessary fall risk management equipment: walker  - Apply yellow socks and bracelet for high fall risk patients  - Consider moving patient to room near nurses station  2/17/2023 1832 by Juju Mixon RN  Outcome: Progressing  2/17/2023 1832 by Juju Mixon RN  Outcome: Progressing     Problem: MOBILITY - ADULT  Goal: Maintain or return to baseline ADL function  Description: INTERVENTIONS:  -  Assess patient's ability to carry out ADLs; assess patient's baseline for ADL function and identify physical deficits which impact ability to perform ADLs (bathing, care of mouth/teeth, toileting, grooming, dressing, etc )  - Assess/evaluate cause of self-care deficits   - Assess range of motion  - Assess patient's mobility; develop plan if impaired  - Assess patient's need for assistive devices and provide as appropriate  - Encourage maximum independence but intervene and supervise when necessary  - Involve family in performance of ADLs  - Assess for home care needs following discharge   - Consider OT consult to assist with ADL evaluation and planning for discharge  - Provide patient education as appropriate  2/17/2023 1832 by Juju Mixon RN  Outcome: Progressing  2/17/2023 1832 by Juju Mixon RN  Outcome: Progressing  Problem: GASTROINTESTINAL - ADULT  Goal: Maintains or returns to baseline bowel function  Description: INTERVENTIONS:  - Assess bowel function  - Encourage oral fluids to ensure adequate hydration  - Administer IV fluids if ordered to ensure adequate hydration  - Administer ordered medications as needed  - Encourage mobilization and activity  - Consider nutritional services referral to assist patient with adequate nutrition and appropriate food choices  2/17/2023 1832 by Maryam Krause RN  Outcome: Progressing  2/17/2023 1832 by Maryam Krause RN  Outcome: Progressing     Problem: Knowledge Deficit  Goal: Patient/family/caregiver demonstrates understanding of disease process, treatment plan, medications, and discharge instructions  Description: Complete learning assessment and assess knowledge base    Interventions:  - Provide teaching at level of understanding  - Provide teaching via preferred learning methods  2/17/2023 1832 by Maryam Krause RN  Outcome: Progressing  2/17/2023 1832 by Maryam Krause RN  Outcome: Progressing

## 2023-02-17 NOTE — ASSESSMENT & PLAN NOTE
· CT abdomen pelvis showing right lower lobe infiltrate with bilateral small effusions  · Reports shortness of breath  · Continue on Rocephin  · Incentive spirometry  · Supportive care

## 2023-02-17 NOTE — ED PROVIDER NOTES
History  Chief Complaint   Patient presents with   • Diarrhea     Pt reports diarrhea since Tuesday  At this time pt report weakness  89y  o female with PMH of anemia, anxiety, basal cell carcinoma, carcinoid tumor, DM, high cholesterol, hyperlipidemia, HTN, OA, sciatica and stage 3 CKD presents to the ER for diarrhea for 4 days  Patient denies taking any medication for symptoms  She denies pain  Symptoms are constant  She denies seeing blood in her stool  She denies sick contacts, sick travel, recent antibiotic use or recent hospitalization  She denies fever, chills, URI symptoms, chest pain, dyspnea, nausea, vomiting, abdominal pain, back pain, urinary symptoms, weakness or paresthesias  History provided by:  Patient   used: No        Prior to Admission Medications   Prescriptions Last Dose Informant Patient Reported? Taking?    Blood Glucose Monitoring Suppl () w/Device KIT   Yes No   Sig: take by Subcutaneous route once   Lancets (onetouch ultrasoft) lancets   No No   Sig: Use in the morning Use as instructed   NIFEdipine ER (ADALAT CC) 60 MG 24 hr tablet   No Yes   Sig: TAKE 1 TABLET EVERY DAY   diazepam (VALIUM) 2 mg tablet   No Yes   Sig: Take one tablet every 6 hours as needed   furosemide (LASIX) 20 mg tablet   No Yes   Sig: Take 1 tablet (20 mg total) by mouth daily   glucose blood test strip   No No   Sig: Use 1 each in the morning Use as instructed   glyBURIDE micronized (GLYNASE) 3 mg tablet   No Yes   Si In am one in pm   levothyroxine (Euthyrox) 88 mcg tablet   No Yes   Sig: Take 1 tablet (88 mcg total) by mouth daily   lisinopril (ZESTRIL) 40 mg tablet   No Yes   Sig: TAKE 1 TABLET EVERY DAY   lovastatin (MEVACOR) 40 MG tablet   No Yes   Sig: TAKE 1 TABLET EVERY DAY   ondansetron (ZOFRAN-ODT) 4 mg disintegrating tablet   No Yes   Sig: Take 1 tablet (4 mg total) by mouth every 8 (eight) hours as needed for nausea or vomiting Facility-Administered Medications: None       Past Medical History:   Diagnosis Date   • Abnormal mammogram 2008   • Anemia 12/22/2020   • Anxiety 5/15/2014   • Basal cell carcinoma (BCC) of neck 6/23/2021   • Carcinoid tumor    • Diabetes mellitus ([facility] [address] )    • Disease of thyroid gland    • High cholesterol    • Hyperlipidemia    • Hypertension    • Osteoarthritis 7/24/2008   • Sciatica 2008   • Stage 3a chronic kidney disease ([facility] [address] ) 1/12/2022       Past Surgical History:   Procedure Laterality Date   • APPENDECTOMY      incidental finding at appendectomy   • LUMBAR DISC SURGERY     • TOTAL ABDOMINAL HYSTERECTOMY W/ BILATERAL SALPINGOOPHORECTOMY      benign   • TUMOR REMOVAL      carcinoid tumor surgery       Family History   Problem Relation Age of Onset   • Liver cancer Mother    • Hyperlipidemia Father    • Heart attack Father    • Heart disease Maternal Grandfather      I have reviewed and agree with the history as documented  E-Cigarette/Vaping   • E-Cigarette Use Never User      E-Cigarette/Vaping Substances   • Nicotine No    • THC No    • CBD No    • Flavoring No    • Other No    • Unknown No      Social History     Tobacco Use   • Smoking status: Never   • Smokeless tobacco: Never   • Tobacco comments:     no exposure to passive smoke   Vaping Use   • Vaping Use: Never used   Substance Use Topics   • Alcohol use: No   • Drug use: No       Review of Systems   Constitutional: Negative for activity change, appetite change, chills and fever  HENT: Negative for congestion, drooling, ear discharge, ear pain, facial swelling, rhinorrhea and sore throat  Eyes: Negative for redness  Respiratory: Negative for cough and shortness of breath  Cardiovascular: Negative for chest pain  Gastrointestinal: Positive for diarrhea  Negative for abdominal pain, blood in stool, nausea and vomiting  Genitourinary: Negative for dysuria, frequency, hematuria and urgency  Musculoskeletal: Negative for neck stiffness  Skin: Negative for rash  Allergic/Immunologic: Negative for food allergies  Neurological: Negative for weakness and numbness  Physical Exam  Physical Exam  Vitals and nursing note reviewed  Constitutional:       General: She is not in acute distress  Appearance: She is not toxic-appearing  HENT:      Head: Normocephalic and atraumatic  Eyes:      Conjunctiva/sclera: Conjunctivae normal    Neck:      Trachea: No tracheal deviation  Cardiovascular:      Rate and Rhythm: Normal rate and regular rhythm  Heart sounds: Normal heart sounds, S1 normal and S2 normal  No murmur heard  No friction rub  No gallop  Pulmonary:      Effort: Pulmonary effort is normal  No respiratory distress  Breath sounds: Normal breath sounds  No decreased breath sounds, wheezing, rhonchi or rales  Chest:      Chest wall: No tenderness  Abdominal:      General: Bowel sounds are normal  There is no distension  Palpations: Abdomen is soft  Tenderness: There is no abdominal tenderness  There is no guarding or rebound  Musculoskeletal:      Cervical back: Normal range of motion and neck supple  Right lower leg: Pitting Edema (right worse then left, which patient reports is normal for her) present  Left lower leg: Pitting Edema present  Skin:     General: Skin is warm and dry  Findings: No rash  Neurological:      Mental Status: She is alert  GCS: GCS eye subscore is 4  GCS verbal subscore is 5  GCS motor subscore is 6     Psychiatric:         Mood and Affect: Mood normal          Vital Signs  ED Triage Vitals   Temperature Pulse Respirations Blood Pressure SpO2   02/17/23 1112 02/17/23 1112 02/17/23 1114 02/17/23 1114 02/17/23 1112   98 2 °F (36 8 °C) 95 18 128/58 95 %      Temp Source Heart Rate Source Patient Position - Orthostatic VS BP Location FiO2 (%)   02/17/23 1112 02/17/23 1507 02/17/23 1112 02/17/23 1112 --   Oral Monitor Sitting Right arm       Pain Score 02/17/23 1117       No Pain           Vitals:    02/17/23 1114 02/17/23 1220 02/17/23 1350 02/17/23 1507   BP: 128/58 114/58 120/58 143/56   Pulse:  85 84 85   Patient Position - Orthostatic VS:    Lying         Visual Acuity      ED Medications  Medications   polyethylene glycol (MIRALAX) packet 17 g (has no administration in time range)   sodium chloride 0 9 % bolus 1,000 mL (0 mL Intravenous Stopped 2/17/23 1505)   cefTRIAXone (ROCEPHIN) IVPB (premix in dextrose) 2,000 mg 50 mL (0 mg Intravenous Stopped 2/17/23 1542)       Diagnostic Studies  Results Reviewed     Procedure Component Value Units Date/Time    FLU/RSV/COVID - if FLU/RSV clinically relevant [[id number]]  (Normal) Collected: 02/17/23 1503    Lab Status: Final result Specimen: Nares from Nose Updated: 02/17/23 1601     SARS-CoV-2 Negative     INFLUENZA A PCR Negative     INFLUENZA B PCR Negative     RSV PCR Negative    Narrative:      FOR PEDIATRIC PATIENTS - copy/paste COVID Guidelines URL to browser: [email or web address]    SARS-CoV-2 assay is a Nucleic Acid Amplification assay intended for the  qualitative detection of nucleic acid from SARS-CoV-2 in nasopharyngeal  swabs  Results are for the presumptive identification of SARS-CoV-2 RNA  Positive results are indicative of infection with SARS-CoV-2, the virus  causing COVID-19, but do not rule out bacterial infection or co-infection  with other viruses  Laboratories within the [place] and its  territories are required to report all positive results to the appropriate  public health authorities  Negative results do not preclude SARS-CoV-2  infection and should not be used as the sole basis for treatment or other  patient management decisions  Negative results must be combined with  clinical observations, patient history, and epidemiological information  This test has not been FDA cleared or approved      This test has been authorized by FDA under an Emergency Use Authorization  (EUA)  This test is only authorized for the duration of time the  declaration that circumstances exist justifying the authorization of the  emergency use of an in vitro diagnostic tests for detection of SARS-CoV-2  virus and/or diagnosis of COVID-19 infection under section 564(b)(1) of  the Act, 21 U  S C  350EDI-2(F)(8), unless the authorization is terminated  or revoked sooner  The test has been validated but independent review by FDA  and CLIA is pending  Test performed using [employer] Gene[employer]pert: This RT-PCR assay targets N2,  a region unique to SARS-CoV-2  A conserved region in the E-gene was chosen  for pan-Sarbecovirus detection which includes SARS-CoV-2  According to CMS-2020-01-R, this platform meets the definition of high-throughput technology  Lactic acid [[id number]]  (Normal) Collected: 02/17/23 1444    Lab Status: Final result Specimen: Blood from Arm, Right Updated: 02/17/23 1600     LACTIC ACID 1 2 mmol/L     Narrative:      Result may be elevated if tourniquet was used during collection  Protime-INR [[id number]]  (Abnormal) Collected: 02/17/23 1444    Lab Status: Final result Specimen: Blood from Arm, Right Updated: 02/17/23 1527     Protime 16 5 seconds      INR 1 34    APTT [[id number]]  (Normal) Collected: 02/17/23 1444    Lab Status: Final result Specimen: Blood from Arm, Right Updated: 02/17/23 1527     PTT 33 seconds     Procalcitonin [[id number]]  (Abnormal) Collected: 02/17/23 1444    Lab Status: Final result Specimen: Blood from Arm, Right Updated: 02/17/23 1523     Procalcitonin 64 18 ng/ml     Blood culture #1 [[id number]] Collected: 02/17/23 1444    Lab Status: In process Specimen: Blood from Arm, Right Updated: 02/17/23 1454    Blood culture #2 [[id number]] Collected: 02/17/23 1451    Lab Status:  In process Specimen: Blood from Arm, Left Updated: 02/17/23 1454    Stool Enteric Bacterial Panel by PCR [[id number]] Collected: 02/17/23 1345    Lab Status:  In process Specimen: Stool from Rectum Updated: 02/17/23 1353    Clostridium difficile toxin by PCR with EIA [[id number]]     Lab Status: No result Specimen: Stool from Rectum     Comprehensive metabolic panel [[id number]]  (Abnormal) Collected: 02/17/23 1117    Lab Status: Final result Specimen: Blood from Arm, Left Updated: 02/17/23 1200     Sodium 134 mmol/L      Potassium 4 8 mmol/L      Chloride 102 mmol/L      CO2 23 mmol/L      ANION GAP 9 mmol/L      BUN 50 mg/dL      Creatinine 2 39 mg/dL      Glucose 184 mg/dL      Calcium 8 9 mg/dL      AST 18 U/L      ALT 15 U/L      Alkaline Phosphatase 64 U/L      Total Protein 6 7 g/dL      Albumin 3 8 g/dL      Total Bilirubin 0 48 mg/dL      eGFR 17 ml/min/1 73sq m     Narrative:      [place] guidelines for Chronic Kidney Disease (CKD):   •  Stage 1 with normal or high GFR (GFR > 90 mL/min/1 73 square meters)  •  Stage 2 Mild CKD (GFR = 60-89 mL/min/1 73 square meters)  •  Stage 3A Moderate CKD (GFR = 45-59 mL/min/1 73 square meters)  •  Stage 3B Moderate CKD (GFR = 30-44 mL/min/1 73 square meters)  •  Stage 4 Severe CKD (GFR = 15-29 mL/min/1 73 square meters)  •  Stage 5 End Stage CKD (GFR <15 mL/min/1 73 square meters)  Note: GFR calculation is accurate only with a steady state creatinine    Lipase [[id number]]  (Abnormal) Collected: 02/17/23 1117    Lab Status: Final result Specimen: Blood from Arm, Left Updated: 02/17/23 1200     Lipase 10 u/L     CBC and differential [[id number]]  (Abnormal) Collected: 02/17/23 1117    Lab Status: Final result Specimen: Blood from Arm, Left Updated: 02/17/23 1122     WBC 23 24 Thousand/uL      RBC 3 77 Million/uL      Hemoglobin 11 3 g/dL      Hematocrit 36 1 %      MCV 96 fL      MCH 30 0 pg      MCHC 31 3 g/dL      RDW 13 0 %      MPV 11 2 fL      Platelets 525 Thousands/uL      nRBC 0 /100 WBCs      Neutrophils Relative 87 %      Immat GRANS % 1 %      Lymphocytes Relative 7 %      Monocytes Relative 5 %      Eosinophils Relative 0 %      Basophils Relative 0 %      Neutrophils Absolute 20 13 Thousands/µL      Immature Grans Absolute 0 22 Thousand/uL      Lymphocytes Absolute 1 70 Thousands/µL      Monocytes Absolute 1 14 Thousand/µL      Eosinophils Absolute 0 00 Thousand/µL      Basophils Absolute 0 05 Thousands/µL                  CT abdomen pelvis wo contrast   Final Result by Kelsy Vaca MD (02/17 1423)      1  Suggestion for right lower lobe infiltrate with bilateral small effusions  2  Stool impaction in the rectum with changes of stercoral colitis  Small amount of presacral fluid  Stool throughout the colon  3  Bladder wall thickening  Correlate for cystitis with urinalysis  Workstation performed: [id number]         XR chest 2 views    (Results Pending)              Procedures  Procedures         ED Course  ED Course as of 02/17/23 1646   Fri Feb 17, 2023   1146 WBC(!): 23 24   1146 Hemoglobin(!): 11 3   1146 Platelet Count: 596   1213 Creatinine(!): 2 39  EILEEN  Will give fluids  Will need admission  1213 Lipase(!): 10   1214 Sodium(!): 134   1425 CT abdomen pelvis wo contrast  Patient now meeting sepsis criteria with infection present now (pneumonia and possible cystitis)  Will add sepsis labs and antibiotics  1446 Sepsis alert called for patient  1505 Informed patient of lab and imaging findings  Will admit   1411 [facility] [address] text sent to [facility] for admission  1528 Procalcitonin(!): 64 18   1637 FLU/RSV/COVID - if FLU/RSV clinically relevant  Negative  1637 LACTIC ACID: 1 2                            Initial Sepsis Screening     Row Name 02/17/23 1433 02/17/23 1357             Is the patient's history suggestive of a new or worsening infection?  Yes (Proceed)  -AR No  -AR       Suspected source of infection pneumonia  -AR --       Indicate SIRS criteria Tachycardia > 90 bpm;Leukocytosis (WBC > 10668 IJL) OR Leukopenia (WBC <4000 IJL) OR Bandemia (WBC >10% bands) -AR --       Are two or more of the above signs & symptoms of infection both present and new to the patient? Yes (Proceed)  -AR --       Assess for evidence of organ dysfunction: Are any of the below criteria present within 6 hours of suspected infection and SIRS criteria that are NOT considered to be chronic conditions? Creatinine > 2 0 AND > 0 5 above baseline  -AR --       Date of presentation of severe sepsis 02/17/23  -AR --       Time of presentation of severe sepsis 1434  -AR --       Sepsis Note: Click "NEXT" below (NOT "close") to generate sepsis note based on above information  YES (proceed by clicking "NEXT")  -AR --             User Key  (r) = Recorded By, (t) = Taken By, (c) = Cosigned By    [address] Name Provider Type    Romeo Rodriguez PA-C Physician Assistant              Default Flowsheet Data (last 720 hours)     Sepsis Reassess     Row Name 02/17/23 1533                   Repeat Volume Status and Tissue Perfusion Assessment Performed    Date of Reassessment: 02/17/23  -AR        Time of Reassessment: 4360  -AR        Sepsis Reassessment Note: Click "NEXT" below (NOT "close") to generate sepsis reassessment note  YES (proceed by clicking "NEXT")  -AR        Repeat Volume Status and Tissue Perfusion Assessment Performed --              User Key  (r) = Recorded By, (t) = Taken By, (c) = Cosigned By    [address] Name Provider Type    Romeo Rodriguez PA-C Physician Assistant              SBIRT 20yo+    Flowsheet Row Most Recent Value   SBIRT (23 yo +)    In order to provide better care to our patients, we are screening all of our patients for alcohol and drug use  Would it be okay to ask you these screening questions? Yes Filed at: 02/17/2023 1157   Initial Alcohol Screen: US AUDIT-C     1  How often do you have a drink containing alcohol? 0 Filed at: 02/17/2023 1159   2  How many drinks containing alcohol do you have on a typical day you are drinking?   0 Filed at: 02/17/2023 1158 3b  FEMALE Any Age, or MALE 65+: How often do you have 4 or more drinks on one occassion? 0 Filed at: 02/17/2023 1156   Audit-C Score 0 Filed at: 02/17/2023 1156   CLARI: How many times in the past year have you    Used an illegal drug or used a prescription medication for non-medical reasons? Never Filed at: 02/17/2023 1156                    Medical Decision Making  89y  o female presents to the ER for diarrhea for 4 days  Vitals stable  Patient in no acute distress  On exam, lungs are clear  Abdomen is soft and non-tender  No guarding, rigidity or distention  No pulsatile masses palpated  Will check labs and imaging  1146 WBC(!): 23 24 - no source of infection at this time  Will hold off on sepsis work up  If source of infection found, will begin sepsis work up  1146 Hemoglobin(!): 11 3    1146 Platelet Count: 113    1213 Creatinine(!): 2 39 - EILEEN  Will give fluids  Will need admission  1213 Lipase(!): 10    1214 Sodium(!): 134    1425 CT abdomen pelvis wo contrast - Patient now meeting sepsis criteria with infection present now (pneumonia and possible cystitis)  Will add sepsis labs and antibiotics  1446 Sepsis alert called for patient  1505 Informed patient of lab and imaging findings  Will admit    1411 [facility] [address] text sent to [place] for admission  975 [facility] with Dr Faustino Nuno from [place] who is agreeable to inpatient admission  1528 Procalcitonin(!): 64 18    1600    Patient signed out to Sergio Wilson PA-C awaiting transfer to [facility]  Patient stable  1637 FLU/RSV/COVID - if FLU/RSV clinically relevant - Negative      1637 LACTIC ACID: 1 2          EILEEN (acute kidney injury) ([facility] [address] ): acute illness or injury  Diarrhea: acute illness or injury  Pleural effusion: acute illness or injury  Right lower lobe pulmonary infiltrate: acute illness or injury  Sepsis ([facility] [address] ): acute illness or injury  Stercoral colitis: acute illness or injury  Amount and/or Complexity of Data Reviewed  Independent Historian:      Details: Patient is historian  External Data Reviewed: labs and notes  Labs: ordered  Decision-making details documented in ED Course  Radiology: ordered  Decision-making details documented in ED Course  Risk  Prescription drug management  Decision regarding hospitalization  Disposition  Final diagnoses:   Diarrhea   Stercoral colitis   Right lower lobe pulmonary infiltrate   Pleural effusion   EILEEN (acute kidney injury) ([facility] [address] )   Sepsis ([facility] [address] )     Time reflects when diagnosis was documented in both MDM as applicable and the Disposition within this note     Time User Action Codes Description Comment    2/17/2023  3:21 PM Ramonia Knock A Add [R19 7] Diarrhea     2/17/2023  3:21 PM Ramonia Knock A Add [K52 89] Stercoral colitis     2/17/2023  3:21 PM Elodia Bunting, [address] [R91 8] Right lower lobe pulmonary infiltrate     2/17/2023  3:21 PM Ramonia Knock A Add [J90] Pleural effusion     2/17/2023  3:21 PM Ramonia Knock A Add [N17 9] EILEEN (acute kidney injury) ([facility] [address] )     2/17/2023  3:22 PM Ramonia Knock A Add [A41 9] Sepsis [facility])       ED Disposition     ED Disposition   Admit    Condition   Stable    Date/Time   Fri Feb 17, 2023  3:21 PM    Comment   Case was discussed with Dr Juan Liu from [facility] and the patient's admission status was agreed to be Admission Status: inpatient status to the service of Dr Juan Liu   Follow-up Information    None         Patient's Medications   Discharge Prescriptions    No medications on file       No discharge procedures on file      PDMP Review     None          ED Provider  Electronically Signed by           Pauline Scott PA-C  02/17/23 2129

## 2023-02-17 NOTE — ASSESSMENT & PLAN NOTE
Lab Results   Component Value Date    HGBA1C 8 7 (H) 01/26/2023       No results for input(s): POCGLU in the last 72 hours      Blood Sugar Average: Last 72 hrs:  · Patient maintained on glyburide outpatient   · Bad experience with insulin in the past and refuses any at this time

## 2023-02-17 NOTE — H&P
[address] [facility]  H&P- Binta Martinez 1/11/1933, 80 y o  female MRN: [id number]  Unit/Bed#: [address] [place] [address] 227-01 Encounter: [id number]  Primary Care Provider: Marichuy Dunlap MD   Date and time admitted to hospital: 2/17/2023 11:09 AM    * Stercoral colitis  Assessment & Plan  · Patient presented the days of none bloody diarrhea associatedpoor p o  intake  · CT abdomen pelvis showing stool impaction in the rectum with changes of stercoral colitis, stool throughout colon  · Soapsuds enema  · Continue on Rocephin and Flagyl  · Bowel regiment with senna, Colace and MiraLAX  · Pain control as needed avoid opioids  · C  difficile and stool PCR ordered    [facility] ([facility]  Assessment & Plan  · Meets sepsis criteria with leukocytosis and tachycardia  · Possible infectious sources include GI origin, pneumonia, UTI  · Blood cultures pending  · Continue Rocephin and Flagyl  · Lactate within normal limits  · Trend procalcitonin    Pneumonia  Assessment & Plan  · CT abdomen pelvis showing right lower lobe infiltrate with bilateral small effusions  · Reports shortness of breath  · Continue on Rocephin  · Incentive spirometry  · Supportive care    Lower extremity edema  Assessment & Plan  · Patient and daughter reports that lower extremity edema has been present for some time  · BNP ordered  · Chest x-ray pending  · No echo on file  · CT abdomen pelvis showing small bilateral effusions    EILEEN (acute kidney injury) ([facility] )  Assessment & Plan  · Likely prerenal secondary to GI losses and poor p o  intake  · Hold lisinopril and Lasix  · Encourage oral intake    Hypertension  Assessment & Plan  · Hold Lasix and lisinopril, continue on nifedipine 60 mg daily    Type 2 diabetes mellitus with diabetic neuropathy, without long-term current use of insulin (HCC)  Assessment & Plan  Lab Results   Component Value Date    HGBA1C 8 7 (H) 01/26/2023       No results for input(s): POCGLU in the last 72 hours      Blood Sugar Average: Last 72 hrs:  · Patient maintained on glyburide outpatient   · Bad experience with insulin in the past and refuses any at this time    VTE Pharmacologic Prophylaxis: VTE Score: 6 High Risk (Score >/= 5) - Pharmacological DVT Prophylaxis Ordered: enoxaparin (Lovenox)  Sequential Compression Devices Ordered  Code Status: No Order full code  Discussion with family: Updated  (daughter) at bedside  Anticipated Length of Stay: Patient will be admitted on an inpatient basis with an anticipated length of stay of greater than 2 midnights secondary to stercoral colitis requiring fecal disimpact meant and bowel regimen, possible pneumonia and UTI requiring IV to biotics, EILEEN  Total Time Spent on Date of Encounter in care of patient: 65 minutes This time was spent on one or more of the following: performing physical exam; counseling and coordination of care; obtaining or reviewing history; documenting in the medical record; reviewing/ordering tests, medications or procedures; communicating with other healthcare professionals and discussing with patient's family/caregivers  Chief Complaint: Diarrhea    History of Present Illness:  Binta Martinez is a 80 y o  female with a PMH of type 2 diabetes, hypertension, hypothyroidism, neuropathy, basal cell carcinoma who presents with 3 days of non-bloody diarrhea  Patient reports that she was straining during a bowel movement when she began having watery stools  She reported that since then she has been incontinent of stool requiring pads  She reports getting progressively weaker over the past few days  She reports poor p o  intake  She endorses discomfort in her rectum  Denies any other pain  Reports some intermittent shortness of breath over the last few days  Patient also has lower extremity edema that has been present for some time  Denies any previous cardiac work-up    Patient denies any sick contacts, fever, chills, nausea, vomiting, chest pain, dysuria  Patient's last alcoholic beverage was a year ago and she has never had a history of smoking  Review of Systems:  Review of Systems   Constitutional: Positive for appetite change  Negative for chills, fatigue and fever  HENT: Negative for rhinorrhea, sinus pressure, sinus pain and sneezing  Respiratory: Positive for shortness of breath  Cardiovascular: Positive for leg swelling  Negative for chest pain and palpitations  Gastrointestinal: Positive for diarrhea and rectal pain  Negative for blood in stool, nausea and vomiting  Genitourinary: Negative for difficulty urinating  Past Medical and Surgical History:   Past Medical History:   Diagnosis Date   • Abnormal mammogram 2008   • Anemia 12/22/2020   • Anxiety 5/15/2014   • Basal cell carcinoma (BCC) of neck 6/23/2021   • Carcinoid tumor    • [facility]    • Disease of thyroid gland    • High cholesterol    • Hyperlipidemia    • Hypertension    • Osteoarthritis 7/24/2008   • Pneumonia 2/17/2023   • Sciatica 2008   • Stage 3a chronic kidney disease ([facility] ) 1/12/2022       Past Surgical History:   Procedure Laterality Date   • APPENDECTOMY      incidental finding at appendectomy   • LUMBAR DISC SURGERY     • TOTAL ABDOMINAL HYSTERECTOMY W/ BILATERAL SALPINGOOPHORECTOMY      benign   • TUMOR REMOVAL      carcinoid tumor surgery       Meds/Allergies:  Prior to Admission medications    Medication Sig Start Date End Date Taking?  Authorizing Provider   diazepam (VALIUM) 2 mg tablet Take one tablet every 6 hours as needed 1/26/23  Yes Dunia Rodrigez MD   furosemide (LASIX) 20 mg tablet Take 1 tablet (20 mg total) by mouth daily 1/26/23  Yes Dunia Rodrigez MD   glyBURIDE micronized (GLYNASE) 3 mg tablet 2 In am one in pm 11/10/22  Yes Dunia Rodrigez MD   levothyroxine (Euthyrox) 88 mcg tablet Take 1 tablet (88 mcg total) by mouth daily 1/31/23  Yes Dunia Rodrigez MD   lisinopril (ZESTRIL) 40 mg tablet TAKE 1 TABLET EVERY DAY 9/15/21  Yes MICKI Daigle   lovastatin (MEVACOR) 40 MG tablet TAKE 1 TABLET EVERY DAY 11/29/22  Yes Tamanna Jose MD   NIFEdipine ER (ADALAT CC) 60 MG 24 hr tablet TAKE 1 TABLET EVERY DAY 3/17/22  Yes Tamanna Jose MD   ondansetron (ZOFRAN-ODT) 4 mg disintegrating tablet Take 1 tablet (4 mg total) by mouth every 8 (eight) hours as needed for nausea or vomiting 1/26/23  Yes Tamanna Jose MD   Blood Glucose Monitoring Suppl ([address]) w/Device KIT take by Subcutaneous route once 1/15/18   Historical Provider, MD   glucose blood test strip Use 1 each in the morning Use as instructed 7/29/22   Tamanna Jose MD   Lancets (onetouch ultrasoft) lancets Use in the morning Use as instructed 7/29/22   Tamanna Jose MD   meclizine (ANTIVERT) 25 mg tablet Take 1 tablet (25 mg total) by mouth 3 (three) times a day as needed for dizziness 12/21/20 2/17/23  Tamanna Jose MD     I have reviewed home medications with patient personally  Allergies:    Allergies   Allergen Reactions   • Codeine      Other reaction(s): GI problems   • Hydrochlorothiazide      Other reaction(s): HYPONATREMIA   • Hydrocodone GI Intolerance   • Ibuprofen Other (See Comments)     Pr report told to not take due to kidney problems   • Oxycodone      Other reaction(s): GI       Social History:  Marital Status: /Civil Union   Occupation: retired  Patient Pre-hospital Living Situation: Home  Patient Pre-hospital Level of Mobility: walks with cane and walker  Patient Pre-hospital Diet Restrictions: none  Substance Use History:   Social History     Substance and Sexual Activity   Alcohol Use No     Social History     Tobacco Use   Smoking Status Never   Smokeless Tobacco Never   Tobacco Comments    no exposure to passive smoke     Social History     Substance and Sexual Activity   Drug Use No       Family History:  Family History   Problem Relation Age of Onset   • Liver cancer Mother    • Hyperlipidemia Father    • Heart attack Father    • Heart disease Maternal Grandfather        Physical Exam:     Vitals:   Blood Pressure: 122/56 (02/17/23 1659)  Pulse: 89 (02/17/23 1659)  Temperature: 98 2 °F (36 8 °C) (02/17/23 1112)  Temp Source: Oral (02/17/23 1112)  Respirations: 18 (02/17/23 1659)  Weight - Scale: 62 kg (136 lb 11 oz) (02/17/23 1220)  SpO2: 94 % (02/17/23 1659)    Physical Exam  Vitals and nursing note reviewed  Constitutional:       Appearance: Normal appearance  She is not ill-appearing  HENT:      Head: Normocephalic and atraumatic  Eyes:      General: No scleral icterus  Cardiovascular:      Rate and Rhythm: Normal rate and regular rhythm  Pulmonary:      Effort: Pulmonary effort is normal       Breath sounds: Normal breath sounds  Abdominal:      General: Abdomen is flat  Bowel sounds are normal       Palpations: Abdomen is soft  Tenderness: There is no abdominal tenderness  Musculoskeletal:      Right lower leg: Edema (+2) present  Left lower leg: Edema (+2) present  Skin:     General: Skin is warm and dry  Neurological:      Mental Status: She is alert  Mental status is at baseline     Psychiatric:         Mood and Affect: Mood normal          Behavior: Behavior normal         Additional Data:     Lab Results:  Results from last 7 days   Lab Units 02/17/23  1117   WBC Thousand/uL 23 24*   HEMOGLOBIN g/dL 11 3*   HEMATOCRIT % 36 1   PLATELETS Thousands/uL 187   NEUTROS PCT % 87*   LYMPHS PCT % 7*   MONOS PCT % 5   EOS PCT % 0     Results from last 7 days   Lab Units 02/17/23  1117   SODIUM mmol/L 134*   POTASSIUM mmol/L 4 8   CHLORIDE mmol/L 102   CO2 mmol/L 23   BUN mg/dL 50*   CREATININE mg/dL 2 39*   ANION GAP mmol/L 9   CALCIUM mg/dL 8 9   ALBUMIN g/dL 3 8   TOTAL BILIRUBIN mg/dL 0 48   ALK PHOS U/L 64   ALT U/L 15   AST U/L 18   GLUCOSE RANDOM mg/dL 184*     Results from last 7 days   Lab Units 02/17/23  1444   INR  1 34*             Results from last 7 days   Lab Units 02/17/23  1444   LACTIC ACID mmol/L 1 2   PROCALCITONIN ng/ml 64 18*       Lines/Drains:  Invasive Devices     Peripheral Intravenous Line  Duration           Peripheral IV 02/17/23 Left Antecubital <1 day                    Imaging: Reviewed radiology reports from this admission including: chest xray and abdominal/pelvic CT  CT abdomen pelvis wo contrast   Final Result by Dora Jasso MD (02/17 1423)      1  Suggestion for right lower lobe infiltrate with bilateral small effusions  2  Stool impaction in the rectum with changes of stercoral colitis  Small amount of presacral fluid  Stool throughout the colon  3  Bladder wall thickening  Correlate for cystitis with urinalysis  Workstation performed: [id number]         XR chest 2 views    (Results Pending)       EKG and Other Studies Reviewed on Admission:   · EKG: NSR  HR 99     ** Please Note: This note has been constructed using a voice recognition system   **

## 2023-02-17 NOTE — SEPSIS NOTE
Sepsis Note   Ronal Botello 80 y o  female MRN: [id number]  Unit/Bed#: ED-42 Encounter: [id number]       Initial Sepsis Screening     [address] Name 02/17/23 1433 02/17/23 1357             Is the patient's history suggestive of a new or worsening infection? Yes (Proceed)  -AR No  -AR       Suspected source of infection pneumonia  -AR --       Indicate SIRS criteria Tachycardia > 90 bpm;Leukocytosis (WBC > 80187 IJL) OR Leukopenia (WBC <4000 IJL) OR Bandemia (WBC >10% bands)  -AR --       Are two or more of the above signs & symptoms of infection both present and new to the patient? Yes (Proceed)  -AR --       Assess for evidence of organ dysfunction: Are any of the below criteria present within 6 hours of suspected infection and SIRS criteria that are NOT considered to be chronic conditions? Creatinine > 2 0 AND > 0 5 above baseline  -AR --       Date of presentation of severe sepsis 02/17/23  -AR --       Time of presentation of severe sepsis 1434  -AR --       Sepsis Note: Click "NEXT" below (NOT "close") to generate sepsis note based on above information  YES (proceed by clicking "NEXT")  -AR --             User Key  (r) = Recorded By, (t) = Taken By, (c) = Cosigned By    [facility] [address] [place] [address] Name Provider Type    Romeo Rodriguez PA-C Physician Assistant                Default Flowsheet Data (last 720 hours)     Sepsis Reassess     Row Name 02/17/23 1533                   Repeat Volume Status and Tissue Perfusion Assessment Performed    Date of Reassessment: 02/17/23  -AR        Time of Reassessment: 4835  -AR        Sepsis Reassessment Note: Click "NEXT" below (NOT "close") to generate sepsis reassessment note   YES (proceed by clicking "NEXT")  -AR        Repeat Volume Status and Tissue Perfusion Assessment Performed --              User Key  (r) = Recorded By, (t) = Taken By, (c) = Cosigned By    [address] Name Provider Type    Romeo Rodriguez PA-C Physician Assistant                Body mass index is 29 07 kg/m²    Wt Readings from Last 1 Encounters:   02/17/23 62 kg (136 lb 11 oz)        Ideal body weight: 46 3 kg (102 lb 2 oz)  Adjusted ideal body weight: 52 6 kg (115 lb 15 2 oz)

## 2023-02-17 NOTE — SEPSIS NOTE
Sepsis Note   Chantal Dunlap 80 y o  female MRN: [id number]  Unit/Bed#: ED-42 Encounter: [id number]       Initial Sepsis Screening     [address] Name 02/17/23 1433 02/17/23 1357             Is the patient's history suggestive of a new or worsening infection? Yes (Proceed)  -AR No  -AR       Suspected source of infection pneumonia  -AR --       Indicate SIRS criteria Tachycardia > 90 bpm;Leukocytosis (WBC > 35813 IJL) OR Leukopenia (WBC <4000 IJL) OR Bandemia (WBC >10% bands)  -AR --       Are two or more of the above signs & symptoms of infection both present and new to the patient? Yes (Proceed)  -AR --       Assess for evidence of organ dysfunction: Are any of the below criteria present within 6 hours of suspected infection and SIRS criteria that are NOT considered to be chronic conditions? Creatinine > 2 0 AND > 0 5 above baseline  -AR --       Date of presentation of severe sepsis 02/17/23  -AR --       Time of presentation of severe sepsis 1434  -AR --       Sepsis Note: Click "NEXT" below (NOT "close") to generate sepsis note based on above information  YES (proceed by clicking "NEXT")  -AR --             User Key  (r) = Recorded By, (t) = Taken By, (c) = Cosigned By    [facility] [address] Name Provider Type    Romeo Rodriguez PA-C Physician Assistant                    Body mass index is 29 07 kg/m²    Wt Readings from Last 1 Encounters:   02/17/23 62 kg (136 lb 11 oz)        Ideal body weight: 46 3 kg (102 lb 2 oz)  Adjusted ideal body weight: 52 6 kg (115 lb 15 2 oz)

## 2023-02-17 NOTE — ASSESSMENT & PLAN NOTE
· Patient presented the days of none bloody diarrhea associatedpoor p o  intake  · CT abdomen pelvis showing stool impaction in the rectum with changes of stercoral colitis, stool throughout colon  · Soapsuds enema  · Continue on Rocephin and Flagyl  · Bowel regiment with senna, Colace and MiraLAX  · Pain control as needed avoid opioids  · C  difficile and stool PCR ordered

## 2023-02-17 NOTE — ASSESSMENT & PLAN NOTE
· Patient and daughter reports that lower extremity edema has been present for some time  · BNP ordered  · Chest x-ray pending  · No echo on file  · CT abdomen pelvis showing small bilateral effusions

## 2023-02-17 NOTE — ASSESSMENT & PLAN NOTE
· Meets sepsis criteria with leukocytosis and tachycardia  · Possible infectious sources include GI origin, pneumonia, UTI  · Blood cultures pending  · Continue Rocephin and Flagyl  · Lactate within normal limits  · Trend procalcitonin

## 2023-02-18 LAB
ALBUMIN SERPL BCP-MCNC: 3.1 G/DL (ref 3.5–5)
ALP SERPL-CCNC: 57 U/L (ref 34–104)
ALT SERPL W P-5'-P-CCNC: 12 U/L (ref 7–52)
ANION GAP SERPL CALCULATED.3IONS-SCNC: 8 MMOL/L (ref 4–13)
AST SERPL W P-5'-P-CCNC: 11 U/L (ref 13–39)
BACTERIA UR QL AUTO: ABNORMAL /HPF
BILIRUB SERPL-MCNC: 0.31 MG/DL (ref 0.2–1)
BILIRUB UR QL STRIP: ABNORMAL
BNP SERPL-MCNC: 986 PG/ML (ref 0–100)
BUN SERPL-MCNC: 56 MG/DL (ref 5–25)
CALCIUM ALBUM COR SERPL-MCNC: 8.8 MG/DL (ref 8.3–10.1)
CALCIUM SERPL-MCNC: 8.1 MG/DL (ref 8.4–10.2)
CAMPYLOBACTER DNA SPEC NAA+PROBE: NORMAL
CHLORIDE SERPL-SCNC: 107 MMOL/L (ref 96–108)
CLARITY UR: CLEAR
CO2 SERPL-SCNC: 22 MMOL/L (ref 21–32)
COLOR UR: YELLOW
CREAT SERPL-MCNC: 2.41 MG/DL (ref 0.6–1.3)
ERYTHROCYTE [DISTWIDTH] IN BLOOD BY AUTOMATED COUNT: 12.9 % (ref 11.6–15.1)
GFR SERPL CREATININE-BSD FRML MDRD: 17 ML/MIN/1.73SQ M
GLUCOSE SERPL-MCNC: 96 MG/DL (ref 65–140)
GLUCOSE UR STRIP-MCNC: NEGATIVE MG/DL
HCT VFR BLD AUTO: 32.3 % (ref 34.8–46.1)
HGB BLD-MCNC: 9.8 G/DL (ref 11.5–15.4)
HGB UR QL STRIP.AUTO: NEGATIVE
KETONES UR STRIP-MCNC: NEGATIVE MG/DL
LEUKOCYTE ESTERASE UR QL STRIP: NEGATIVE
MAGNESIUM SERPL-MCNC: 1.7 MG/DL (ref 1.9–2.7)
MCH RBC QN AUTO: 29.5 PG (ref 26.8–34.3)
MCHC RBC AUTO-ENTMCNC: 30.3 G/DL (ref 31.4–37.4)
MCV RBC AUTO: 97 FL (ref 82–98)
NITRITE UR QL STRIP: NEGATIVE
NON-SQ EPI CELLS URNS QL MICRO: ABNORMAL /HPF
PH UR STRIP.AUTO: 5 [PH]
PLATELET # BLD AUTO: 138 THOUSANDS/UL (ref 149–390)
PMV BLD AUTO: 12.6 FL (ref 8.9–12.7)
POTASSIUM SERPL-SCNC: 4.6 MMOL/L (ref 3.5–5.3)
PROCALCITONIN SERPL-MCNC: 77.33 NG/ML
PROT SERPL-MCNC: 5.6 G/DL (ref 6.4–8.4)
PROT UR STRIP-MCNC: ABNORMAL MG/DL
RBC # BLD AUTO: 3.32 MILLION/UL (ref 3.81–5.12)
RBC #/AREA URNS AUTO: ABNORMAL /HPF
SALMONELLA DNA SPEC QL NAA+PROBE: NORMAL
SHIGA TOXIN STX GENE SPEC NAA+PROBE: NORMAL
SHIGELLA DNA SPEC QL NAA+PROBE: NORMAL
SODIUM SERPL-SCNC: 137 MMOL/L (ref 135–147)
SP GR UR STRIP.AUTO: 1.02 (ref 1–1.03)
UROBILINOGEN UR QL STRIP.AUTO: 0.2 E.U./DL
WBC # BLD AUTO: 14.14 THOUSAND/UL (ref 4.31–10.16)
WBC #/AREA URNS AUTO: ABNORMAL /HPF

## 2023-02-18 RX ORDER — MAGNESIUM SULFATE HEPTAHYDRATE 40 MG/ML
2 INJECTION, SOLUTION INTRAVENOUS ONCE
Status: COMPLETED | OUTPATIENT
Start: 2023-02-18 | End: 2023-02-18

## 2023-02-18 RX ORDER — SODIUM CHLORIDE 9 MG/ML
100 INJECTION, SOLUTION INTRAVENOUS CONTINUOUS
Status: DISCONTINUED | OUTPATIENT
Start: 2023-02-18 | End: 2023-02-19

## 2023-02-18 RX ADMIN — LEVOTHYROXINE SODIUM 88 MCG: 88 TABLET ORAL at 06:44

## 2023-02-18 RX ADMIN — METRONIDAZOLE 500 MG: 500 INJECTION, SOLUTION INTRAVENOUS at 17:20

## 2023-02-18 RX ADMIN — DOCUSATE SODIUM 100 MG: 100 CAPSULE, LIQUID FILLED ORAL at 17:20

## 2023-02-18 RX ADMIN — HEPARIN SODIUM 5000 UNITS: 5000 INJECTION INTRAVENOUS; SUBCUTANEOUS at 15:16

## 2023-02-18 RX ADMIN — POLYETHYLENE GLYCOL 3350 17 G: 17 POWDER, FOR SOLUTION ORAL at 09:16

## 2023-02-18 RX ADMIN — HEPARIN SODIUM 5000 UNITS: 5000 INJECTION INTRAVENOUS; SUBCUTANEOUS at 22:01

## 2023-02-18 RX ADMIN — PRAVASTATIN SODIUM 40 MG: 40 TABLET ORAL at 16:17

## 2023-02-18 RX ADMIN — CEFTRIAXONE 1000 MG: 1 INJECTION, SOLUTION INTRAVENOUS at 15:16

## 2023-02-18 RX ADMIN — MAGNESIUM SULFATE HEPTAHYDRATE 2 G: 40 INJECTION, SOLUTION INTRAVENOUS at 12:24

## 2023-02-18 RX ADMIN — METRONIDAZOLE 500 MG: 500 INJECTION, SOLUTION INTRAVENOUS at 09:17

## 2023-02-18 RX ADMIN — NIFEDIPINE 60 MG: 60 TABLET, FILM COATED, EXTENDED RELEASE ORAL at 09:17

## 2023-02-18 RX ADMIN — METRONIDAZOLE 500 MG: 500 INJECTION, SOLUTION INTRAVENOUS at 02:18

## 2023-02-18 RX ADMIN — SENNOSIDES 8.6 MG: 8.6 TABLET ORAL at 09:17

## 2023-02-18 RX ADMIN — HEPARIN SODIUM 5000 UNITS: 5000 INJECTION INTRAVENOUS; SUBCUTANEOUS at 06:44

## 2023-02-18 RX ADMIN — DOCUSATE SODIUM 100 MG: 100 CAPSULE, LIQUID FILLED ORAL at 09:17

## 2023-02-18 RX ADMIN — SODIUM CHLORIDE 500 ML: 0.9 INJECTION, SOLUTION INTRAVENOUS at 17:03

## 2023-02-18 NOTE — PLAN OF CARE
Problem: Potential for Falls  Goal: Patient will remain free of falls  Description: INTERVENTIONS:  - Educate patient/family on patient safety including physical limitations  - Instruct patient to call for assistance with activity   - Consult OT/PT to assist with strengthening/mobility   - Keep Call bell within reach  - Keep bed low and locked with side rails adjusted as appropriate  - Keep care items and personal belongings within reach  - Initiate and maintain comfort rounds  - Make Fall Risk Sign visible to staff  - Offer Toileting every 2 Hours, in advance of need  - Initiate/Maintain bed alarm  - Obtain necessary fall risk management equipment: alarms  - Apply yellow socks and bracelet for high fall risk patients  - Consider moving patient to room near nurses station  Outcome: Progressing     Problem: MOBILITY - ADULT  Goal: Maintain or return to baseline ADL function  Description: INTERVENTIONS:  -  Assess patient's ability to carry out ADLs; assess patient's baseline for ADL function and identify physical deficits which impact ability to perform ADLs (bathing, care of mouth/teeth, toileting, grooming, dressing, etc )  - Assess/evaluate cause of self-care deficits   - Assess range of motion  - Assess patient's mobility; develop plan if impaired  - Assess patient's need for assistive devices and provide as appropriate  - Encourage maximum independence but intervene and supervise when necessary  - Involve family in performance of ADLs  - Assess for home care needs following discharge   - Consider OT consult to assist with ADL evaluation and planning for discharge  - Provide patient education as appropriate  Outcome: Progressing  Goal: Maintains/Returns to pre admission functional level  Description: INTERVENTIONS:  - Perform BMAT or MOVE assessment daily    - Set and communicate daily mobility goal to care team and patient/family/caregiver     - Collaborate with rehabilitation services on mobility goals if consulted  - Perform Range of Motion 4 times a day  - Reposition patient every 2 hours  - Dangle patient 4 times a day  - Stand patient 4 times a day  - Ambulate patient 4 times a day  - Out of bed to chair 4 times a day   - Out of bed for meals 3 times a day  - Out of bed for toileting  - Record patient progress and toleration of activity level   Outcome: Progressing     Problem: GASTROINTESTINAL - ADULT  Goal: Maintains or returns to baseline bowel function  Description: INTERVENTIONS:  - Assess bowel function  - Encourage oral fluids to ensure adequate hydration  - Administer IV fluids if ordered to ensure adequate hydration  - Administer ordered medications as needed  - Encourage mobilization and activity  - Consider nutritional services referral to assist patient with adequate nutrition and appropriate food choices  Outcome: Progressing     Problem: Knowledge Deficit  Goal: Patient/family/caregiver demonstrates understanding of disease process, treatment plan, medications, and discharge instructions  Description: Complete learning assessment and assess knowledge base    Interventions:  - Provide teaching at level of understanding  - Provide teaching via preferred learning methods  Outcome: Progressing

## 2023-02-18 NOTE — ASSESSMENT & PLAN NOTE
· Patient and daughter reports that lower extremity edema has been present for some time  · BNP ordered  · Chest xray reviewed, small b/l pleural effusions, "subsegmental atelectasis vs  infiltrate right lung base"  · Check 2D Echo  · For now continue with IVF given EILEEN, minimal urine output

## 2023-02-18 NOTE — ASSESSMENT & PLAN NOTE
Lab Results   Component Value Date    HGBA1C 8 7 (H) 01/26/2023       Recent Labs     02/17/23  1745 02/17/23  2135   POCGLU 131 210*       Blood Sugar Average: Last 72 hrs:  · (P) 170 5Patient maintained on glyburide outpatient   · Bad experience with insulin in the past and refuses any at this time

## 2023-02-18 NOTE — ASSESSMENT & PLAN NOTE
Uncontrolled, elevated TSH  Patient states her PCP recently increased her levothyroxine to 88 mcg at the end of January  Should repeat TSH in 3 to 4 weeks

## 2023-02-18 NOTE — PLAN OF CARE
Problem: Potential for Falls  Goal: Patient will remain free of falls  Description: INTERVENTIONS:  - Educate patient/family on patient safety including physical limitations  - Instruct patient to call for assistance with activity   - Consult OT/PT to assist with strengthening/mobility   - Keep Call bell within reach  - Keep bed low and locked with side rails adjusted as appropriate  - Keep care items and personal belongings within reach  - Initiate and maintain comfort rounds  - Make Fall Risk Sign visible to staff  - Offer Toileting every 2 Hours, in advance of need  - Initiate/Maintain alarm  - Obtain necessary fall risk management equipment  - Apply yellow socks and bracelet for high fall risk patients  - Consider moving patient to room near nurses station  Outcome: Progressing     Problem: MOBILITY - ADULT  Goal: Maintain or return to baseline ADL function  Description: INTERVENTIONS:  -  Assess patient's ability to carry out ADLs; assess patient's baseline for ADL function and identify physical deficits which impact ability to perform ADLs (bathing, care of mouth/teeth, toileting, grooming, dressing, etc )  - Assess/evaluate cause of self-care deficits   - Assess range of motion  - Assess patient's mobility; develop plan if impaired  - Assess patient's need for assistive devices and provide as appropriate  - Encourage maximum independence but intervene and supervise when necessary  - Involve family in performance of ADLs  - Assess for home care needs following discharge   - Consider OT consult to assist with ADL evaluation and planning for discharge  - Provide patient education as appropriate  Outcome: Progressing  Goal: Maintains/Returns to pre admission functional level  Description: INTERVENTIONS:  - Perform BMAT or MOVE assessment daily    - Set and communicate daily mobility goal to care team and patient/family/caregiver     - Collaborate with rehabilitation services on mobility goals if consulted  - Perform Range of Motion 3 times a day  - Reposition patient every 2 hours  - Dangle patient 3 times a day  - Stand patient 3 times a day  - Ambulate patient 3 times a day  - Out of bed to chair 3 times a day   - Out of bed for meals 3 times a day  - Out of bed for toileting  - Record patient progress and toleration of activity level   Outcome: Progressing     Problem: GASTROINTESTINAL - ADULT  Goal: Maintains or returns to baseline bowel function  Description: INTERVENTIONS:  - Assess bowel function  - Encourage oral fluids to ensure adequate hydration  - Administer IV fluids if ordered to ensure adequate hydration  - Administer ordered medications as needed  - Encourage mobilization and activity  - Consider nutritional services referral to assist patient with adequate nutrition and appropriate food choices  Outcome: Progressing     Problem: Knowledge Deficit  Goal: Patient/family/caregiver demonstrates understanding of disease process, treatment plan, medications, and discharge instructions  Description: Complete learning assessment and assess knowledge base    Interventions:  - Provide teaching at level of understanding  - Provide teaching via preferred learning methods  Outcome: Progressing     Problem: Prexisting or High Potential for Compromised Skin Integrity  Goal: Skin integrity is maintained or improved  Description: INTERVENTIONS:  - Identify patients at risk for skin breakdown  - Assess and monitor skin integrity  - Assess and monitor nutrition and hydration status  - Monitor labs   - Assess for incontinence   - Turn and reposition patient  - Assist with mobility/ambulation  - Relieve pressure over bony prominences  - Avoid friction and shearing  - Provide appropriate hygiene as needed including keeping skin clean and dry  - Evaluate need for skin moisturizer/barrier cream  - Collaborate with interdisciplinary team   - Patient/family teaching  - Consider wound care consult   Outcome: Progressing Problem: INFECTION - ADULT  Goal: Absence or prevention of progression during hospitalization  Description: INTERVENTIONS:  - Assess and monitor for signs and symptoms of infection  - Monitor lab/diagnostic results  - Monitor all insertion sites, i e  indwelling lines, tubes, and drains  - Monitor endotracheal if appropriate and nasal secretions for changes in amount and color  - [place] appropriate cooling/warming therapies per order  - Administer medications as ordered  - Instruct and encourage patient and family to use good hand hygiene technique  - Identify and instruct in appropriate isolation precautions for identified infection/condition  Outcome: Progressing     Problem: SAFETY ADULT  Goal: Patient will remain free of falls  Description: INTERVENTIONS:  - Educate patient/family on patient safety including physical limitations  - Instruct patient to call for assistance with activity   - Consult OT/PT to assist with strengthening/mobility   - Keep Call bell within reach  - Keep bed low and locked with side rails adjusted as appropriate  - Keep care items and personal belongings within reach  - Initiate and maintain comfort rounds  - Make Fall Risk Sign visible to staff  - Offer Toileting every 2 Hours, in advance of need  - Initiate/Maintain alarm  - Obtain necessary fall risk management equipment  - Apply yellow socks and bracelet for high fall risk patients  - Consider moving patient to room near nurses station  Outcome: Progressing  Goal: Maintain or return to baseline ADL function  Description: INTERVENTIONS:  -  Assess patient's ability to carry out ADLs; assess patient's baseline for ADL function and identify physical deficits which impact ability to perform ADLs (bathing, care of mouth/teeth, toileting, grooming, dressing, etc )  - Assess/evaluate cause of self-care deficits   - Assess range of motion  - Assess patient's mobility; develop plan if impaired  - Assess patient's need for assistive devices and provide as appropriate  - Encourage maximum independence but intervene and supervise when necessary  - Involve family in performance of ADLs  - Assess for home care needs following discharge   - Consider OT consult to assist with ADL evaluation and planning for discharge  - Provide patient education as appropriate  Outcome: Progressing  Goal: Maintains/Returns to pre admission functional level  Description: INTERVENTIONS:  - Perform BMAT or MOVE assessment daily    - Set and communicate daily mobility goal to care team and patient/family/caregiver  - Collaborate with rehabilitation services on mobility goals if consulted  - Perform Range of Motion 3 times a day  - Reposition patient every 2 hours    - Dangle patient 3 times a day  - Stand patient 3 times a day  - Ambulate patient 3 times a day  - Out of bed to chair 3 times a day   - Out of bed for meals 3 times a day  - Out of bed for toileting  - Record patient progress and toleration of activity level   Outcome: Progressing     Problem: DISCHARGE PLANNING  Goal: Discharge to home or other facility with appropriate resources  Description: INTERVENTIONS:  - Identify barriers to discharge w/patient and caregiver  - Arrange for needed discharge resources and transportation as appropriate  - Identify discharge learning needs (meds, wound care, etc )  - Arrange for interpretive services to assist at discharge as needed  - Refer to Case Management Department for coordinating discharge planning if the patient needs post-hospital services based on physician/advanced practitioner order or complex needs related to functional status, cognitive ability, or social support system  Outcome: Progressing     Problem: METABOLIC, FLUID AND ELECTROLYTES - ADULT  Goal: Fluid balance maintained  Description: INTERVENTIONS:  - Monitor labs   - Monitor I/O and WT  - Instruct patient on fluid and nutrition as appropriate  - Assess for signs & symptoms of volume excess or deficit  Outcome: Progressing     Problem: SKIN/TISSUE INTEGRITY - ADULT  Goal: Skin Integrity remains intact(Skin Breakdown Prevention)  Description: Assess:  -Perform [place] assessment   -Clean and moisturize skin   -Inspect skin when repositioning, toileting, and assisting with ADLS  -Assess under medical devices   -Assess extremities for adequate circulation and sensation     Bed Management:  -Have minimal linens on bed & keep smooth, unwrinkled  -Change linens as needed when moist or perspiring  -Avoid sitting or lying in one position for more than 2 hours while in bed  -Keep HOB at 45 degrees     Toileting:  -Offer bedside commode  -Assess for incontinence   -Use incontinent care products after each incontinent episode    Activity:  -Mobilize patient 3 times a day  -Encourage activity and walks on unit  -Encourage or provide ROM exercises   -Turn and reposition patient every 2 Hours  -Use appropriate equipment to lift or move patient in bed  -Instruct/ Assist with weight shifting every 2 when out of bed in chair  -Consider limitation of chair time 2 hour intervals    Skin Care:  -Avoid use of baby powder, tape, friction and shearing, hot water or constrictive clothing  -Relieve pressure over bony prominences   -Do not massage red bony areas    Next Steps:  -Teach patient strategies to minimize risks    -Consider consults to  interdisciplinary teams   Outcome: Progressing

## 2023-02-18 NOTE — PROGRESS NOTES
[address] [place] [address]  Progress Note - Sabine Castellano 1/11/1933, 80 y o  female MRN: [id number]  Unit/Bed#: [facility] [address] [id number] Encounter: [id number]  Primary Care Provider: Noemi Taylor MD   Date and time admitted to hospital: 2/17/2023 11:09 AM    * Sepsis [facility])  Assessment & Plan  · Meets sepsis criteria with leukocytosis and tachycardia  · Possible infectious sources include GI origin, pneumonia, UTI  · Markedly elevated procalcitonin, monitor  · Blood cultures pending  · Continue Rocephin and Flagyl  · Lactate within normal limits      EILEEN (acute kidney injury) ([facility] [address] )  Assessment & Plan  · Likely prerenal secondary to GI losses and poor p o  intake  · Hold lisinopril and Lasix  · Encourage oral intake    Lower extremity edema  Assessment & Plan  · Patient and daughter reports that lower extremity edema has been present for some time  · BNP ordered  · Chest xray reviewed, small b/l pleural effusions, "subsegmental atelectasis vs  infiltrate right lung base"  · Check 2D Echo  · For now continue with IVF given EILEEN, minimal urine output     Stercoral colitis  Assessment & Plan  · Patient presented the days of none bloody diarrhea associatedpoor p o  intake  · CT abdomen pelvis showing stool impaction in the rectum with changes of stercoral colitis, stool throughout colon  · Soapsuds enema  · Continue on Rocephin and Flagyl  · Bowel regiment with senna, Colace and MiraLAX  · Pain control as needed avoid opioids  · C  difficile and stool PCR ordered    Hypothyroid  Assessment & Plan  Uncontrolled, elevated TSH  Patient states her PCP recently increased her levothyroxine to 88 mcg at the end of January  Should repeat TSH in 3 to 4 weeks    Hypertension  Assessment & Plan  · Hold Lasix and lisinopril, continue on nifedipine 60 mg daily    Type 2 diabetes mellitus with diabetic neuropathy, without long-term current use of insulin [facility])  Assessment & Plan  Lab Results   Component Value Date    HGBA1C 8 7 (H) 2023       Recent Labs     23  1745 23  2135   POCGLU 131 210*       Blood Sugar Average: Last 72 hrs:  · (P) 170 5Patient maintained on glyburide outpatient   · Bad experience with insulin in the past and refuses any at this time          VTE Pharmacologic Prophylaxis: VTE Score: 6 High Risk (Score >/= 5) - Pharmacological DVT Prophylaxis Ordered: heparin  Sequential Compression Devices Ordered  Patient Centered Rounds: I performed bedside rounds with nursing staff today  Discussions with Specialists or Other Care Team Provider: Case management    Education and Discussions with Family / Patient: Updated  (daughter) via phone  Total Time Spent on Date of Encounter in care of patient: 55 minutes This time was spent on one or more of the following: performing physical exam; counseling and coordination of care; obtaining or reviewing history; documenting in the medical record; reviewing/ordering tests, medications or procedures; communicating with other healthcare professionals and discussing with patient's family/caregivers  Current Length of Stay: 1 day(s)  Current Patient Status: Inpatient   Certification Statement: The patient will continue to require additional inpatient hospital stay due to IV Rx, close monitoring, diagnostic studies   Discharge Plan: Anticipate discharge in 48-72 hrs to home with home services  Code Status: Level 3 - DNAR and DNI    Subjective:   Patient seen and examined  She reports feeling well and had a large BM yesterday after enema  No urine output per nursing  Objective:     Vitals:   Temp (24hrs), Av 6 °F (36 4 °C), Min:97 4 °F (36 3 °C), Max:97 7 °F (36 5 °C)    Temp:  [97 4 °F (36 3 °C)-97 7 °F (36 5 °C)] 97 4 °F (36 3 °C)  HR:  [84-89] 85  Resp:  [15-19] 18  BP: (113-134)/(56-71) 113/66  SpO2:  [93 %-97 %] 93 %  Body mass index is 29 21 kg/m²       Input and Output Summary (last 24 hours):   No intake or output data in the 24 hours ending 02/18/23 1559    Physical Exam:   Physical Exam  Constitutional:       General: She is not in acute distress  HENT:      Head: Normocephalic and atraumatic  Nose: No congestion  Eyes:      Conjunctiva/sclera: Conjunctivae normal    Cardiovascular:      Rate and Rhythm: Normal rate and regular rhythm  Heart sounds: No murmur heard  Pulmonary:      Effort: No respiratory distress  Breath sounds: No wheezing or rales  Abdominal:      General: There is no distension  Tenderness: There is no abdominal tenderness  There is no guarding  Musculoskeletal:      Right lower leg: Edema present  Left lower leg: Edema present  Skin:     General: Skin is warm and dry  Neurological:      Mental Status: She is oriented to person, place, and time     Psychiatric:         Mood and Affect: Mood normal           Additional Data:     Labs:  Results from last 7 days   Lab Units 02/18/23  0513 02/17/23  1117   WBC Thousand/uL 14 14* 23 24*   HEMOGLOBIN g/dL 9 8* 11 3*   HEMATOCRIT % 32 3* 36 1   PLATELETS Thousands/uL 138* 187   NEUTROS PCT %  --  87*   LYMPHS PCT %  --  7*   MONOS PCT %  --  5   EOS PCT %  --  0     Results from last 7 days   Lab Units 02/18/23  0513   SODIUM mmol/L 137   POTASSIUM mmol/L 4 6   CHLORIDE mmol/L 107   CO2 mmol/L 22   BUN mg/dL 56*   CREATININE mg/dL 2 41*   ANION GAP mmol/L 8   CALCIUM mg/dL 8 1*   ALBUMIN g/dL 3 1*   TOTAL BILIRUBIN mg/dL 0 31   ALK PHOS U/L 57   ALT U/L 12   AST U/L 11*   GLUCOSE RANDOM mg/dL 96     Results from last 7 days   Lab Units 02/17/23  1444   INR  1 34*     Results from last 7 days   Lab Units 02/17/23  2135 02/17/23  1745   POC GLUCOSE mg/dl 210* 131         Results from last 7 days   Lab Units 02/18/23  0513 02/17/23  1444   LACTIC ACID mmol/L  --  1 2   PROCALCITONIN ng/ml 77 33* 64 18*       Lines/Drains:  Invasive Devices     Peripheral Intravenous Line  Duration           Peripheral IV 02/17/23 Left Antecubital 1 day Imaging: Reviewed radiology reports from this admission including: chest xray and Personally reviewed the following imaging: chest xray    Recent Cultures (last 7 days):   Results from last 7 days   Lab Units 02/17/23  1451 02/17/23  1444   BLOOD CULTURE  Received in Microbiology Lab  Culture in Progress  Received in Microbiology Lab  Culture in Progress  Last 24 Hours Medication List:   Current Facility-Administered Medications   Medication Dose Route Frequency Provider Last Rate   • acetaminophen  650 mg Oral Q6H PRN Marin Pleitez PA-C     • cefTRIAXone  1,000 mg Intravenous Q24H Marin Pleitez PA-C     • diazepam  2 mg Oral Q8H PRN Marin Pleitez PA-C     • docusate sodium  100 mg Oral BID Marin Pleitez PA-C     • heparin (porcine)  5,000 Units Subcutaneous [facility] [address] Estefany Gregory PA-C     • levothyroxine  88 mcg Oral Early Morning Marin Pleitez PA-C     • metroNIDAZOLE  500 mg Intravenous Q8H Marin Pleitez PA-C 500 mg (02/18/23 0917)   • NIFEdipine  60 mg Oral Daily Estefany Gregory PA-C     • ondansetron  4 mg Intravenous Q6H PRN Marin Pleitez PA-C     • polyethylene glycol  17 g Oral Daily Marin Pleitez PA-C     • pravastatin  40 mg Oral Daily With [employer] Kayley Gregory PA-C     • senna  1 tablet Oral Daily Estefany Gregory PA-C     • sodium chloride  500 mL Intravenous Once Sonu Ambriz MD     • sodium chloride  100 mL/hr Intravenous Continuous Sonu Ambriz MD          Today, Patient Was Seen By: Sonu Ambriz MD    **Please Note: This note may have been constructed using a voice recognition system  **

## 2023-02-18 NOTE — ASSESSMENT & PLAN NOTE
· Meets sepsis criteria with leukocytosis and tachycardia  · Possible infectious sources include GI origin, pneumonia, UTI  · Markedly elevated procalcitonin, monitor  · Blood cultures pending  · Continue Rocephin and Flagyl  · Lactate within normal limits

## 2023-02-19 LAB
ALBUMIN SERPL BCP-MCNC: 2.4 G/DL (ref 3.5–5)
ALBUMIN SERPL BCP-MCNC: 3.1 G/DL (ref 3.5–5)
ALP SERPL-CCNC: 46 U/L (ref 34–104)
ALP SERPL-CCNC: 59 U/L (ref 34–104)
ALT SERPL W P-5'-P-CCNC: 12 U/L (ref 7–52)
ALT SERPL W P-5'-P-CCNC: 9 U/L (ref 7–52)
ANION GAP SERPL CALCULATED.3IONS-SCNC: 5 MMOL/L (ref 4–13)
ANION GAP SERPL CALCULATED.3IONS-SCNC: 6 MMOL/L (ref 4–13)
AST SERPL W P-5'-P-CCNC: 11 U/L (ref 13–39)
AST SERPL W P-5'-P-CCNC: 9 U/L (ref 13–39)
BASOPHILS # BLD AUTO: 0.01 THOUSANDS/ÂΜL (ref 0–0.1)
BASOPHILS # BLD AUTO: 0.02 THOUSANDS/ÂΜL (ref 0–0.1)
BASOPHILS NFR BLD AUTO: 0 % (ref 0–1)
BASOPHILS NFR BLD AUTO: 0 % (ref 0–1)
BILIRUB SERPL-MCNC: 0.23 MG/DL (ref 0.2–1)
BILIRUB SERPL-MCNC: 0.32 MG/DL (ref 0.2–1)
BUN SERPL-MCNC: 51 MG/DL (ref 5–25)
BUN SERPL-MCNC: 59 MG/DL (ref 5–25)
CALCIUM ALBUM COR SERPL-MCNC: 7.6 MG/DL (ref 8.3–10.1)
CALCIUM ALBUM COR SERPL-MCNC: 8.6 MG/DL (ref 8.3–10.1)
CALCIUM SERPL-MCNC: 6.3 MG/DL (ref 8.4–10.2)
CALCIUM SERPL-MCNC: 7.9 MG/DL (ref 8.4–10.2)
CHLORIDE SERPL-SCNC: 106 MMOL/L (ref 96–108)
CHLORIDE SERPL-SCNC: 114 MMOL/L (ref 96–108)
CO2 SERPL-SCNC: 19 MMOL/L (ref 21–32)
CO2 SERPL-SCNC: 22 MMOL/L (ref 21–32)
CREAT SERPL-MCNC: 1.86 MG/DL (ref 0.6–1.3)
CREAT SERPL-MCNC: 2.15 MG/DL (ref 0.6–1.3)
EOSINOPHIL # BLD AUTO: 0.03 THOUSAND/ÂΜL (ref 0–0.61)
EOSINOPHIL # BLD AUTO: 0.06 THOUSAND/ÂΜL (ref 0–0.61)
EOSINOPHIL NFR BLD AUTO: 1 % (ref 0–6)
EOSINOPHIL NFR BLD AUTO: 1 % (ref 0–6)
ERYTHROCYTE [DISTWIDTH] IN BLOOD BY AUTOMATED COUNT: 13.1 % (ref 11.6–15.1)
ERYTHROCYTE [DISTWIDTH] IN BLOOD BY AUTOMATED COUNT: 13.1 % (ref 11.6–15.1)
GFR SERPL CREATININE-BSD FRML MDRD: 19 ML/MIN/1.73SQ M
GFR SERPL CREATININE-BSD FRML MDRD: 23 ML/MIN/1.73SQ M
GLUCOSE SERPL-MCNC: 81 MG/DL (ref 65–140)
GLUCOSE SERPL-MCNC: 90 MG/DL (ref 65–140)
HCT VFR BLD AUTO: 22.8 % (ref 34.8–46.1)
HCT VFR BLD AUTO: 33.4 % (ref 34.8–46.1)
HGB BLD-MCNC: 10.1 G/DL (ref 11.5–15.4)
HGB BLD-MCNC: 6.9 G/DL (ref 11.5–15.4)
IMM GRANULOCYTES # BLD AUTO: 0.05 THOUSAND/UL (ref 0–0.2)
IMM GRANULOCYTES # BLD AUTO: 0.12 THOUSAND/UL (ref 0–0.2)
IMM GRANULOCYTES NFR BLD AUTO: 1 % (ref 0–2)
IMM GRANULOCYTES NFR BLD AUTO: 1 % (ref 0–2)
LYMPHOCYTES # BLD AUTO: 1 THOUSANDS/ÂΜL (ref 0.6–4.47)
LYMPHOCYTES # BLD AUTO: 1.49 THOUSANDS/ÂΜL (ref 0.6–4.47)
LYMPHOCYTES NFR BLD AUTO: 16 % (ref 14–44)
LYMPHOCYTES NFR BLD AUTO: 16 % (ref 14–44)
MCH RBC QN AUTO: 29.5 PG (ref 26.8–34.3)
MCH RBC QN AUTO: 30.1 PG (ref 26.8–34.3)
MCHC RBC AUTO-ENTMCNC: 30.2 G/DL (ref 31.4–37.4)
MCHC RBC AUTO-ENTMCNC: 30.3 G/DL (ref 31.4–37.4)
MCV RBC AUTO: 100 FL (ref 82–98)
MCV RBC AUTO: 98 FL (ref 82–98)
MONOCYTES # BLD AUTO: 0.37 THOUSAND/ÂΜL (ref 0.17–1.22)
MONOCYTES # BLD AUTO: 0.56 THOUSAND/ÂΜL (ref 0.17–1.22)
MONOCYTES NFR BLD AUTO: 6 % (ref 4–12)
MONOCYTES NFR BLD AUTO: 6 % (ref 4–12)
NEUTROPHILS # BLD AUTO: 4.91 THOUSANDS/ÂΜL (ref 1.85–7.62)
NEUTROPHILS # BLD AUTO: 7.18 THOUSANDS/ÂΜL (ref 1.85–7.62)
NEUTS SEG NFR BLD AUTO: 76 % (ref 43–75)
NEUTS SEG NFR BLD AUTO: 76 % (ref 43–75)
NRBC BLD AUTO-RTO: 0 /100 WBCS
NRBC BLD AUTO-RTO: 0 /100 WBCS
PLATELET # BLD AUTO: 123 THOUSANDS/UL (ref 149–390)
PLATELET # BLD AUTO: 175 THOUSANDS/UL (ref 149–390)
PMV BLD AUTO: 10 FL (ref 8.9–12.7)
PMV BLD AUTO: 10.8 FL (ref 8.9–12.7)
POTASSIUM SERPL-SCNC: 3.7 MMOL/L (ref 3.5–5.3)
POTASSIUM SERPL-SCNC: 4.3 MMOL/L (ref 3.5–5.3)
PROCALCITONIN SERPL-MCNC: 34.04 NG/ML
PROCALCITONIN SERPL-MCNC: 38.66 NG/ML
PROT SERPL-MCNC: 4.3 G/DL (ref 6.4–8.4)
PROT SERPL-MCNC: 5.6 G/DL (ref 6.4–8.4)
RBC # BLD AUTO: 2.29 MILLION/UL (ref 3.81–5.12)
RBC # BLD AUTO: 3.42 MILLION/UL (ref 3.81–5.12)
SODIUM SERPL-SCNC: 134 MMOL/L (ref 135–147)
SODIUM SERPL-SCNC: 138 MMOL/L (ref 135–147)
WBC # BLD AUTO: 6.37 THOUSAND/UL (ref 4.31–10.16)
WBC # BLD AUTO: 9.43 THOUSAND/UL (ref 4.31–10.16)

## 2023-02-19 RX ORDER — LEVOTHYROXINE SODIUM 0.1 MG/1
100 TABLET ORAL
Status: DISCONTINUED | OUTPATIENT
Start: 2023-02-20 | End: 2023-02-24 | Stop reason: HOSPADM

## 2023-02-19 RX ORDER — SODIUM CHLORIDE 9 MG/ML
100 INJECTION, SOLUTION INTRAVENOUS CONTINUOUS
Status: DISCONTINUED | OUTPATIENT
Start: 2023-02-19 | End: 2023-02-20

## 2023-02-19 RX ORDER — POTASSIUM CHLORIDE 20 MEQ/1
40 TABLET, EXTENDED RELEASE ORAL ONCE
Status: COMPLETED | OUTPATIENT
Start: 2023-02-19 | End: 2023-02-19

## 2023-02-19 RX ADMIN — CEFTRIAXONE 1000 MG: 1 INJECTION, SOLUTION INTRAVENOUS at 14:29

## 2023-02-19 RX ADMIN — LEVOTHYROXINE SODIUM 88 MCG: 88 TABLET ORAL at 06:40

## 2023-02-19 RX ADMIN — METRONIDAZOLE 500 MG: 500 INJECTION, SOLUTION INTRAVENOUS at 18:02

## 2023-02-19 RX ADMIN — SODIUM BICARBONATE 100 ML/HR: 84 INJECTION, SOLUTION INTRAVENOUS at 11:21

## 2023-02-19 RX ADMIN — PRAVASTATIN SODIUM 40 MG: 40 TABLET ORAL at 18:02

## 2023-02-19 RX ADMIN — DOCUSATE SODIUM 100 MG: 100 CAPSULE, LIQUID FILLED ORAL at 08:05

## 2023-02-19 RX ADMIN — HEPARIN SODIUM 5000 UNITS: 5000 INJECTION INTRAVENOUS; SUBCUTANEOUS at 14:29

## 2023-02-19 RX ADMIN — SENNOSIDES 8.6 MG: 8.6 TABLET ORAL at 08:05

## 2023-02-19 RX ADMIN — POLYETHYLENE GLYCOL 3350 17 G: 17 POWDER, FOR SOLUTION ORAL at 08:05

## 2023-02-19 RX ADMIN — HEPARIN SODIUM 5000 UNITS: 5000 INJECTION INTRAVENOUS; SUBCUTANEOUS at 06:40

## 2023-02-19 RX ADMIN — METRONIDAZOLE 500 MG: 500 INJECTION, SOLUTION INTRAVENOUS at 11:21

## 2023-02-19 RX ADMIN — DOCUSATE SODIUM 100 MG: 100 CAPSULE, LIQUID FILLED ORAL at 18:02

## 2023-02-19 RX ADMIN — POTASSIUM CHLORIDE 40 MEQ: 1500 TABLET, EXTENDED RELEASE ORAL at 11:21

## 2023-02-19 RX ADMIN — SODIUM CHLORIDE 100 ML/HR: 0.9 INJECTION, SOLUTION INTRAVENOUS at 01:03

## 2023-02-19 RX ADMIN — HEPARIN SODIUM 5000 UNITS: 5000 INJECTION INTRAVENOUS; SUBCUTANEOUS at 22:00

## 2023-02-19 RX ADMIN — METRONIDAZOLE 500 MG: 500 INJECTION, SOLUTION INTRAVENOUS at 01:04

## 2023-02-19 RX ADMIN — SODIUM CHLORIDE 100 ML/HR: 0.9 INJECTION, SOLUTION INTRAVENOUS at 14:30

## 2023-02-19 RX ADMIN — NIFEDIPINE 60 MG: 60 TABLET, FILM COATED, EXTENDED RELEASE ORAL at 08:05

## 2023-02-19 NOTE — PROGRESS NOTES
[address] [place] [address]  Progress Note - Shanell Oliveira 1/11/1933, 80 y o  female MRN: [id number]  Unit/Bed#: [facility] Christopher 87 227-01 Encounter: [id number]  Primary Care Provider: Norma Kramer MD   Date and time admitted to hospital: 2/17/2023 11:09 AM    * Sepsis (HCC)  Assessment & Plan  · Met sepsis criteria with leukocytosis and tachycardia   · Suspected due to colitis   · Markedly elevated procalcitonin, trending down, monitor  · Blood cultures pending - no growth to date   · Continue Rocephin and Flagyl  · Lactate within normal limits      EILEEN (acute kidney injury) ([facility] [address] )  Assessment & Plan  · Likely prerenal secondary to GI losses and poor p o  intake  · Improved  · Continue to lisinopril and Lasix  · Appreciate Nephrology input  · Continue IVF  · Monitor urine output, PVR      Lower extremity edema  Assessment & Plan  · Patient and daughter reports that lower extremity edema has been present for some time -no erythema or tenderness, improved today  · BNP elevated  · Chest xray reviewed, small b/l pleural effusions, "subsegmental atelectasis vs  infiltrate right lung base"  · 2D Echo pending  · For now continue with IVF given EILEEN, minimal urine output   · Venous duplex ordered    Pneumonia  Assessment & Plan  · CT abdomen pelvis showing right lower lobe infiltrate with bilateral small effusions  · No clinical picture of pneumonia  · Already on Abx for colitis     Stercoral colitis  Assessment & Plan  · Patient presented the days of none bloody diarrhea associatedpoor p o  intake  · CT abdomen pelvis showing stool impaction in the rectum with changes of stercoral colitis, stool throughout colon  · Had a BM with soapsuds enema, will repeat with evidence of stool in rectal vault  · Continue Rocephin and Flagyl  · Bowel regiment with senna, Colace and MiraLAX  · Pain control as needed, avoid opioids  · C   Difficile pending stool PCR negative     Hypothyroid  Assessment & Plan  · Uncontrolled, elevated TSH  · Patient states her PCP recently increased her levothyroxine to 88 mcg at the end of January - at that time TSH was 18, now 24 -we will increase levothyroxine to 100 mcg daily  · We will need to review TSH in 3 to 4 weeks      Hypertension  Assessment & Plan  · Hold Lasix and lisinopril, continue on nifedipine 60 mg daily    Type 2 diabetes mellitus with diabetic neuropathy, without long-term current use of insulin [facility])  Assessment & Plan  Lab Results   Component Value Date    HGBA1C 8 7 (H) 01/26/2023       Recent Labs     02/17/23  1745 02/17/23  2135   POCGLU 131 210*       Blood Sugar Average: Last 72 hrs:  · (P) 170 5   · Patient maintained on glyburide outpatient   · Bad experience with insulin in the past and refuses any at this time          VTE Pharmacologic Prophylaxis: VTE Score: 6 High Risk (Score >/= 5) - Pharmacological DVT Prophylaxis Ordered: heparin  Sequential Compression Devices Ordered  Patient Centered Rounds: I performed bedside rounds with nursing staff today  Discussions with Specialists or Other Care Team Provider: Nephrology     Education and Discussions with Family / Patient: will call daughter   Total Time Spent on Date of Encounter in care of patient: 55 minutes This time was spent on one or more of the following: performing physical exam; counseling and coordination of care; obtaining or reviewing history; documenting in the medical record; reviewing/ordering tests, medications or procedures; communicating with other healthcare professionals and discussing with patient's family/caregivers  Current Length of Stay: 2 day(s)  Current Patient Status: Inpatient   Certification Statement: The patient will continue to require additional inpatient hospital stay due to close monitoring, IV RX  Discharge Plan: Anticipate discharge in 48-72 hrs to home with home services  Code Status: Level 3 - DNAR and DNI    Subjective:   Patient seen and examined    No acute complaints  Per nursing appears to have large amount of stool in rectal vault  Still with low urine output  Objective:     Vitals:   Temp (24hrs), Av 8 °F (36 6 °C), Min:97 4 °F (36 3 °C), Max:98 1 °F (36 7 °C)    Temp:  [97 4 °F (36 3 °C)-98 1 °F (36 7 °C)] 98 °F (36 7 °C)  HR:  [84-85] 84  Resp:  [16-18] 16  BP: (113-126)/(63-68) 126/68  SpO2:  [92 %-93 %] 92 %  Body mass index is 30 96 kg/m²  Input and Output Summary (last 24 hours): Intake/Output Summary (Last 24 hours) at 2023 1349  Last data filed at 2023 1151  Gross per 24 hour   Intake 1780 ml   Output 175 ml   Net 1605 ml       Physical Exam:   Physical Exam  Constitutional:       General: She is not in acute distress  HENT:      Nose: No congestion  Eyes:      Conjunctiva/sclera: Conjunctivae normal    Cardiovascular:      Rate and Rhythm: Normal rate and regular rhythm  Pulmonary:      Effort: No respiratory distress  Breath sounds: No wheezing or rales  Abdominal:      General: There is no distension  Tenderness: There is no abdominal tenderness  There is no guarding  Musculoskeletal:      Right lower leg: No edema  Left lower leg: No edema  Skin:     General: Skin is warm and dry  Neurological:      Mental Status: She is oriented to person, place, and time     Psychiatric:         Mood and Affect: Mood normal           Additional Data:     Labs:  Results from last 7 days   Lab Units 23  0804   WBC Thousand/uL 9 43   HEMOGLOBIN g/dL 10 1*   HEMATOCRIT % 33 4*   PLATELETS Thousands/uL 175   NEUTROS PCT % 76*   LYMPHS PCT % 16   MONOS PCT % 6   EOS PCT % 1     Results from last 7 days   Lab Units 23  0804   SODIUM mmol/L 134*   POTASSIUM mmol/L 4 3   CHLORIDE mmol/L 106   CO2 mmol/L 22   BUN mg/dL 59*   CREATININE mg/dL 2 15*   ANION GAP mmol/L 6   CALCIUM mg/dL 7 9*   ALBUMIN g/dL 3 1*   TOTAL BILIRUBIN mg/dL 0 32   ALK PHOS U/L 59   ALT U/L 12   AST U/L 11*   GLUCOSE RANDOM mg/dL 90 Results from last 7 days   Lab Units 02/17/23  1444   INR  1 34*     Results from last 7 days   Lab Units 02/17/23  2135 02/17/23  1745   POC GLUCOSE mg/dl 210* 131         Results from last 7 days   Lab Units 02/19/23  0804 02/19/23  0601 02/18/23  0513 02/17/23  1444   LACTIC ACID mmol/L  --   --   --  1 2   PROCALCITONIN ng/ml 38 66* 34 04* 77 33* 64 18*       Lines/Drains:  Invasive Devices     Peripheral Intravenous Line  Duration           Peripheral IV 02/17/23 Left Antecubital 2 days    Peripheral IV 02/18/23 Right;Ventral (anterior) Forearm <1 day                      Imaging: will review venous duplex    Recent Cultures (last 7 days):   Results from last 7 days   Lab Units 02/17/23  1451 02/17/23  1444   BLOOD CULTURE  No Growth at 24 hrs  No Growth at 24 hrs         Last 24 Hours Medication List:   Current Facility-Administered Medications   Medication Dose Route Frequency Provider Last Rate   • acetaminophen  650 mg Oral Q6H PRN Nikita Campbell PA-C     • cefTRIAXone  1,000 mg Intravenous Q24H Nikita Campbell PA-C Stopped (02/18/23 1546)   • diazepam  2 mg Oral Q8H PRN Nikita Campbell PA-C     • docusate sodium  100 mg Oral BID Nikita Campbell PA-C     • heparin (porcine)  5,000 Units Subcutaneous [facility] Estefany Hill PA-C     • [START ON 2/20/2023] levothyroxine  100 mcg Oral Early Morning Clarice Valentine MD     • metroNIDAZOLE  500 mg Intravenous Q8H Estefany Hill PA-C Stopped (02/19/23 1151)   • ondansetron  4 mg Intravenous Q6H PRN Nikita Campbell PA-C     • polyethylene glycol  17 g Oral Daily Nikita Campbell PA-C     • pravastatin  40 mg Oral Daily With [employer] Prakash Hill PA-C     • senna  1 tablet Oral Daily Estefany Hill PA-C     • sodium chloride  100 mL/hr Intravenous Continuous Sasha Du MD          Today, Patient Was Seen By: Sasha Du MD    **Please Note: This note may have been constructed using a voice recognition system  **

## 2023-02-19 NOTE — ASSESSMENT & PLAN NOTE
· Met sepsis criteria with leukocytosis and tachycardia   · Suspected due to colitis   · Markedly elevated procalcitonin, trending down, monitor  · Blood cultures pending - no growth to date   · Continue Rocephin and Flagyl  · Lactate within normal limits

## 2023-02-19 NOTE — ASSESSMENT & PLAN NOTE
· CT abdomen pelvis showing right lower lobe infiltrate with bilateral small effusions  · No clinical picture of pneumonia  · Already on Abx for colitis

## 2023-02-19 NOTE — ASSESSMENT & PLAN NOTE
· Patient presented the days of none bloody diarrhea associatedpoor p o  intake  · CT abdomen pelvis showing stool impaction in the rectum with changes of stercoral colitis, stool throughout colon  · Had a BM with soapsuds enema, will repeat with evidence of stool in rectal vault  · Continue Rocephin and Flagyl  · Bowel regiment with senna, Colace and MiraLAX  · Pain control as needed, avoid opioids  · C   Difficile pending stool PCR negative

## 2023-02-19 NOTE — ASSESSMENT & PLAN NOTE
Lab Results   Component Value Date    HGBA1C 8 7 (H) 01/26/2023       Recent Labs     02/17/23  1745 02/17/23  2135   POCGLU 131 210*       Blood Sugar Average: Last 72 hrs:  · (P) 170 5   · Patient maintained on glyburide outpatient   · Bad experience with insulin in the past and refuses any at this time

## 2023-02-19 NOTE — ASSESSMENT & PLAN NOTE
· Patient and daughter reports that lower extremity edema has been present for some time -no erythema or tenderness, improved today  · BNP elevated  · Chest xray reviewed, small b/l pleural effusions, "subsegmental atelectasis vs  infiltrate right lung base"  · 2D Echo pending  · For now continue with IVF given EILEEN, minimal urine output   · Venous duplex ordered

## 2023-02-19 NOTE — CASE MANAGEMENT
Case Management Assessment & Discharge Planning Note    Patient name Palak Benitez  Location [place] [facility] [address] Rolando Chao* MRN [id number]  : 1933 Date 2023       Current Admission Date: 2023  Current Admission Diagnosis:Sepsis [facility])   Patient Active Problem List    Diagnosis Date Noted   • Stercoral colitis 2023   • Sepsis ([facility] [address] ) 2023   • Pneumonia 2023   • EILEEN (acute kidney injury) ([facility] [address] ) 2023   • Lower extremity edema 2023   • Stage 3a chronic kidney disease ([facility] [address] ) 2022   • Basal cell carcinoma (BCC) of neck 2021   • Anemia 2020   • Vertigo 2020   • Neuropathy 2019   • Anxiety 05/15/2014   • Type 2 diabetes mellitus with diabetic neuropathy, without long-term current use of insulin [facility] 05/15/2014   • Hyperlipidemia 05/15/2014   • Hypertension 05/15/2014   • Hypothyroid 05/15/2014   • Microalbuminuria 2012   • Osteoarthritis 2008      LOS (days): 2  Geometric Mean LOS (GMLOS) (days): 5 00  Days to GMLOS:3     OBJECTIVE:    Risk of Unplanned Readmission Score: 14 37         Current admission status: Inpatient       Preferred Pharmacy:   [address] [address]  Phone: [phone] Fax: [address]  [address] [address]  Phone: [phone] Fax: [phone]    [address] [facility] [address]  Phone: [phone] Fax: [phone]    Primary Care Provider: Isrrael Cisse MD    Primary Insurance: MEDICARE  Secondary Insurance: BLUE CROSS    ASSESSMENT:  [address] [facility], [address] Representative - Daughter   Primary Phone: [phone] (Home)               Advance Directives  Does patient have a [address]?: Yes  Does patient have Advance Directives?: Yes  Advance Directives: Living will, Power of  for health care Law [place] reported her sister is POA)  Primary Contact: Daughter Zahra Puga [phone]              Patient Information  Admitted from[de-identified] Home  Mental Status: Alert  During Assessment patient was accompanied by: Not accompanied during assessment  Assessment information provided by[de-identified] Patient  Primary Caregiver: Self  Support Systems: Family members, Daughter  [place] of Residence: [address] do you live in?: [address] [facility] entry access options   Select all that apply : Ramp  Type of Current Residence: [place]  In the last 12 months, was there a time when you were not able to pay the mortgage or rent on time?: No  In the last 12 months, how many places have you lived?: 1  In the last 12 months, was there a time when you did not have a steady place to sleep or slept in a shelter (including now)?: No  Homeless/housing insecurity resource given?: N/A  Living Arrangements: Lives w/ Daughter (and son in law)    Activities of Daily Living Prior to Admission  Functional Status: Independent  Completes ADLs independently?: Yes  Ambulates independently?: Yes  Does patient use assisted devices?: Yes  Assisted Devices (DME) used: Straight Cane, Shower Chair, Walker  Does patient currently own DME?: Yes  What DME does the patient currently own?: Shower Chair, Straight Cane, Walker, Other (Comment) (relative is providing pt with wheelchair to use post d/c)  Does patient currently have [facility]?: No         Patient Information Continued  Income Source: SSI/SSD  Does patient have prescription coverage?: Yes  Within the past 12 months, you worried that your food would run out before you got the money to buy more : Never true  Within the past 12 months, the food you bought just didn't last and you didn't have money to get more : Never true  Food insecurity resource given?: N/A  Does patient receive dialysis treatments?: No  Does patient have a history of substance abuse?: No  Does patient have a history of Mental Health Diagnosis?: No         Means of Transportation  Means of Transport to Appts[de-identified] Family transport  In the past 12 months, has lack of transportation kept you from medical appointments or from getting medications?: No  In the past 12 months, has lack of transportation kept you from meetings, work, or from getting things needed for daily living?: No  Was application for public transport provided?: N/A        DISCHARGE DETAILS:    Discharge planning discussed with[de-identified] Daughter  Freedom of Choice: Yes  Comments - Freedom of Choice: Agreeable to blanket referrals for STR  CM contacted family/caregiver?: Yes             Contacts  Patient Contacts: Bette Wilkins Method: Phone  Phone Number: [phone]  Reason/Outcome: Emergency Contact, Discharge [address]         Is the patient interested in [facility] at discharge?: No    DME Referral Provided  Referral made for DME?: No    Other Referral/Resources/Interventions Provided:  Interventions: Short Term Rehab  Referral Comments: Referrals placed in Aidin         Treatment Team Recommendation: Short Term Rehab                                            Additional Comments: CM spoke with daughter Amadou Che via phone who confirmed agreeability for blanket referrals for STR  CM dept submitted referrals via Aidin  [facility] anticipating d/c in 48-72hrs  CM dept to follow

## 2023-02-19 NOTE — ASSESSMENT & PLAN NOTE
· Uncontrolled, elevated TSH  · Patient states her PCP recently increased her levothyroxine to 88 mcg at the end of January - at that time TSH was 18, now 24 -we will increase levothyroxine to 100 mcg daily  · We will need to review TSH in 3 to 4 weeks

## 2023-02-19 NOTE — PLAN OF CARE
Problem: Potential for Falls  Goal: Patient will remain free of falls  Description: INTERVENTIONS:  - Educate patient/family on patient safety including physical limitations  - Instruct patient to call for assistance with activity   - Consult OT/PT to assist with strengthening/mobility   - Keep Call bell within reach  - Keep bed low and locked with side rails adjusted as appropriate  - Keep care items and personal belongings within reach  - Initiate and maintain comfort rounds  - Make Fall Risk Sign visible to staff  - Offer Toileting every 2 Hours, in advance of need  - Initiate/Maintain alarm  - Obtain necessary fall risk management equipment  - Apply yellow socks and bracelet for high fall risk patients  - Consider moving patient to room near nurses station  Outcome: Progressing     Problem: MOBILITY - ADULT  Goal: Maintain or return to baseline ADL function  Description: INTERVENTIONS:  -  Assess patient's ability to carry out ADLs; assess patient's baseline for ADL function and identify physical deficits which impact ability to perform ADLs (bathing, care of mouth/teeth, toileting, grooming, dressing, etc )  - Assess/evaluate cause of self-care deficits   - Assess range of motion  - Assess patient's mobility; develop plan if impaired  - Assess patient's need for assistive devices and provide as appropriate  - Encourage maximum independence but intervene and supervise when necessary  - Involve family in performance of ADLs  - Assess for home care needs following discharge   - Consider OT consult to assist with ADL evaluation and planning for discharge  - Provide patient education as appropriate  Outcome: Progressing  Goal: Maintains/Returns to pre admission functional level  Description: INTERVENTIONS:  - Perform BMAT or MOVE assessment daily    - Set and communicate daily mobility goal to care team and patient/family/caregiver     - Collaborate with rehabilitation services on mobility goals if consulted  - Perform Range of Motion 3 times a day  - Reposition patient every 2 hours  - Dangle patient 3 times a day  - Stand patient 3 times a day  - Ambulate patient 3 times a day  - Out of bed to chair 3 times a day   - Out of bed for meals 3 times a day  - Out of bed for toileting  - Record patient progress and toleration of activity level   Outcome: Progressing     Problem: GASTROINTESTINAL - ADULT  Goal: Maintains or returns to baseline bowel function  Description: INTERVENTIONS:  - Assess bowel function  - Encourage oral fluids to ensure adequate hydration  - Administer IV fluids if ordered to ensure adequate hydration  - Administer ordered medications as needed  - Encourage mobilization and activity  - Consider nutritional services referral to assist patient with adequate nutrition and appropriate food choices  Outcome: Progressing     Problem: Knowledge Deficit  Goal: Patient/family/caregiver demonstrates understanding of disease process, treatment plan, medications, and discharge instructions  Description: Complete learning assessment and assess knowledge base    Interventions:  - Provide teaching at level of understanding  - Provide teaching via preferred learning methods  Outcome: Progressing     Problem: Prexisting or High Potential for Compromised Skin Integrity  Goal: Skin integrity is maintained or improved  Description: INTERVENTIONS:  - Identify patients at risk for skin breakdown  - Assess and monitor skin integrity  - Assess and monitor nutrition and hydration status  - Monitor labs   - Assess for incontinence   - Turn and reposition patient  - Assist with mobility/ambulation  - Relieve pressure over bony prominences  - Avoid friction and shearing  - Provide appropriate hygiene as needed including keeping skin clean and dry  - Evaluate need for skin moisturizer/barrier cream  - Collaborate with interdisciplinary team   - Patient/family teaching  - Consider wound care consult   Outcome: Progressing Problem: INFECTION - ADULT  Goal: Absence or prevention of progression during hospitalization  Description: INTERVENTIONS:  - Assess and monitor for signs and symptoms of infection  - Monitor lab/diagnostic results  - Monitor all insertion sites, i e  indwelling lines, tubes, and drains  - Monitor endotracheal if appropriate and nasal secretions for changes in amount and color  - [place] appropriate cooling/warming therapies per order  - Administer medications as ordered  - Instruct and encourage patient and family to use good hand hygiene technique  - Identify and instruct in appropriate isolation precautions for identified infection/condition  Outcome: Progressing     Problem: SAFETY ADULT  Goal: Patient will remain free of falls  Description: INTERVENTIONS:  - Educate patient/family on patient safety including physical limitations  - Instruct patient to call for assistance with activity   - Consult OT/PT to assist with strengthening/mobility   - Keep Call bell within reach  - Keep bed low and locked with side rails adjusted as appropriate  - Keep care items and personal belongings within reach  - Initiate and maintain comfort rounds  - Make Fall Risk Sign visible to staff  - Offer Toileting every 2 Hours, in advance of need  - Initiate/Maintain alarm  - Obtain necessary fall risk management equipment  - Apply yellow socks and bracelet for high fall risk patients  - Consider moving patient to room near nurses station  Outcome: Progressing  Goal: Maintain or return to baseline ADL function  Description: INTERVENTIONS:  -  Assess patient's ability to carry out ADLs; assess patient's baseline for ADL function and identify physical deficits which impact ability to perform ADLs (bathing, care of mouth/teeth, toileting, grooming, dressing, etc )  - Assess/evaluate cause of self-care deficits   - Assess range of motion  - Assess patient's mobility; develop plan if impaired  - Assess patient's need for assistive devices and provide as appropriate  - Encourage maximum independence but intervene and supervise when necessary  - Involve family in performance of ADLs  - Assess for home care needs following discharge   - Consider OT consult to assist with ADL evaluation and planning for discharge  - Provide patient education as appropriate  Outcome: Progressing  Goal: Maintains/Returns to pre admission functional level  Description: INTERVENTIONS:  - Perform BMAT or MOVE assessment daily    - Set and communicate daily mobility goal to care team and patient/family/caregiver  - Collaborate with rehabilitation services on mobility goals if consulted  - Perform Range of Motion 3 times a day  - Reposition patient every 2 hours    - Dangle patient 3 times a day  - Stand patient 3 times a day  - Ambulate patient 3 times a day  - Out of bed to chair 3 times a day   - Out of bed for meals 3 times a day  - Out of bed for toileting  - Record patient progress and toleration of activity level   Outcome: Progressing     Problem: DISCHARGE PLANNING  Goal: Discharge to home or other facility with appropriate resources  Description: INTERVENTIONS:  - Identify barriers to discharge w/patient and caregiver  - Arrange for needed discharge resources and transportation as appropriate  - Identify discharge learning needs (meds, wound care, etc )  - Arrange for interpretive services to assist at discharge as needed  - Refer to Case Management Department for coordinating discharge planning if the patient needs post-hospital services based on physician/advanced practitioner order or complex needs related to functional status, cognitive ability, or social support system  Outcome: Progressing     Problem: METABOLIC, FLUID AND ELECTROLYTES - ADULT  Goal: Fluid balance maintained  Description: INTERVENTIONS:  - Monitor labs   - Monitor I/O and WT  - Instruct patient on fluid and nutrition as appropriate  - Assess for signs & symptoms of volume excess or deficit  Outcome: Progressing     Problem: SKIN/TISSUE INTEGRITY - ADULT  Goal: Skin Integrity remains intact(Skin Breakdown Prevention)  Description: Assess:  -Perform [place] assessment   -Clean and moisturize skin   -Inspect skin when repositioning, toileting, and assisting with ADLS  -Assess under medical devices   -Assess extremities for adequate circulation and sensation     Bed Management:  -Have minimal linens on bed & keep smooth, unwrinkled  -Change linens as needed when moist or perspiring  -Avoid sitting or lying in one position for more than 2 hours while in bed  -Keep HOB at 45 degrees     Toileting:  -Offer bedside commode  -Assess for incontinence   -Use incontinent care products after each incontinent episode     Activity:  -Mobilize patient 3 times a day  -Encourage activity and walks on unit  -Encourage or provide ROM exercises   -Turn and reposition patient every 2 Hours  -Use appropriate equipment to lift or move patient in bed  -Instruct/ Assist with weight shifting every 2 when out of bed in chair  -Consider limitation of chair time 2 hour intervals    Skin Care:  -Avoid use of baby powder, tape, friction and shearing, hot water or constrictive clothing  -Relieve pressure over bony prominences   -Do not massage red bony areas    Next Steps:  -Teach patient strategies to minimize risks    -Consider consults to  interdisciplinary teams   Outcome: Progressing

## 2023-02-19 NOTE — PLAN OF CARE
Problem: PHYSICAL THERAPY ADULT  Goal: Performs mobility at highest level of function for planned discharge setting  See evaluation for individualized goals  Description: Treatment/Interventions: Functional transfer training, LE strengthening/ROM, Therapeutic exercise, Endurance training, Patient/family training, Bed mobility, Gait training, Spoke to nursing, OT  Equipment Recommended: Steve Pedroza (Pt owns walker)       See flowsheet documentation for full assessment, interventions and recommendations  Note: Prognosis: Good  Problem List: Decreased strength, Decreased endurance, Decreased mobility, Impaired balance, Impaired sensation  Assessment: Pt  89 y  o female presents with diarrhea  Past medical hx includes anemia, anxiety, basal cell carcinoma, carcinoid tumor, DM, high cholesterol, hyperlipidemia, HTN, OA, sciatica and stage 3 CKD  Pt admitted for Sepsis [facility]) w/ Diarrhea, Weakness, Pleural effusion, EILEEN, Sepsis, Right lower lobe pulmonary infiltrate, Stercoral colitis  Pt referred to PT for functional mobility evaluation & D/C planning w/ orders of up w/ assistance  PTA, pt reports being independent with use of RW in home, and [facility] for short distances   During evaluation, deficits included dec mobility, balance, ambulation  Please see flow sheet above for objective findings and level of assistance required for safe completion of functional tasks  Pt demonstrated dec endurance and tolerance to activity  Required modAx1 for bed mobility, minAx1 for sit<>stand transfers, and modAx1 for ambulation  Pt performed 4 trials of sit<>stand tranfers; incontinent with each stand requiring pericare from therapist  Pt was able to side step 2 steps towards [facility] however remained incontinent  No pain reported but had inc soreness to buttock  Denies reports of dizziness or SOB t/o session  Pt was educated on fall precautions and reinforced w/ good understanding   Pt would benefit from continued PT to address deficits as defined above and maximize level of independence with functional mobility and safety  The patient's AM-PAC Basic Mobility Inpatient Short Form Raw Score is 12  A Raw score of less than or equal to 16 suggests the patient may benefit from discharge to post-acute rehabilitation services  Please also refer to the recommendation of the Physical Therapist for safe discharge planning  From PT/mobility standpoint recommendation for D/C to STR, when medically cleared based on objective measures above, current function  CM to follow  Nsg staff to continue to mobilized pt (OOB in chair for all meals & ambulate in room/unit) as tolerated to prevent further decline in function  Nsg notified  PT Discharge Recommendation: Post acute rehabilitation services    See flowsheet documentation for full assessment

## 2023-02-19 NOTE — CONSULTS
Consultation - Nephrology   James Paiz 80 y o  female MRN: [id number]  Unit/Bed#: [id number] Encounter: [id number]    Inpatient consult to Nephrology  Consult performed by: Cici Delarosa MD  Consult ordered by: Micha Chicas MD          History of Present Illness   Physician Requesting Consult: Micha Chicas MD  Reason for Consult / Principal Problem: EILEEN/CKD 3    HPI: James Paiz is a 80y o  year old female with a PMH of diabetes mellitus type 2/hypertension/hypothyroidism who presents with diarrhea/watery for a couple of days occasionally incontinent of stool  This was associated with progressive weakness poor oral intake  No nausea vomiting, no significant abdominal pain    Patient also found to have potential sepsis with right lower lobe pneumonia and, stercoral colitis with stool impaction  We are asked to see the patient for acute on top of chronic kidney disease      The patient denies any prior kidney problems  She denies any history of urinary tract infection/kidney stone/bladder problems  She denies any significant NSAID intake  Baseline creatinine approximately 1 37-1 45 most recently in January 26, 2023  Creatinine on admission was 2 39 on 2/17 asad to 2 41 on 2/18  Current creatinine improved to 1 86  She also denies any dysuria hematuria voiding symptoms foamy urine        History obtained from the patient, triannual records which I completely reviewed        Review of systems:  General: No fevers or chills eyes see HPI  Cardiovascular: No chest pain, some very mild rare dyspnea on exertion, chronic lower extremity edema since August since she was placed on Actos  Respiratory: No wheezing or significant coughing  Gastrointestinal: See HPI, no nausea or vomiting  Genitourinary: See HPI  Neuro: No significant headaches  All other systems were reviewed and are negative    Historical Information   Past Medical History:   Diagnosis Date   • Abnormal mammogram 2008   • Anemia 12/22/2020   • Anxiety 5/15/2014   • Basal cell carcinoma (BCC) of neck 6/23/2021   • Carcinoid tumor    • Diabetes mellitus ([facility] [address] )    • Disease of thyroid gland    • High cholesterol    • Hyperlipidemia    • Hypertension    • Osteoarthritis 7/24/2008   • Pneumonia 2/17/2023   • Sciatica 2008   • Stage 3a chronic kidney disease ([facility] [address] ) 1/12/2022     Past Surgical History:   Procedure Laterality Date   • APPENDECTOMY      incidental finding at appendectomy   • LUMBAR DISC SURGERY     • TOTAL ABDOMINAL HYSTERECTOMY W/ BILATERAL SALPINGOOPHORECTOMY      benign   • TUMOR REMOVAL      carcinoid tumor surgery     Social History   Social History     Substance and Sexual Activity   Alcohol Use Not Currently    Comment: 0     Social History     Substance and Sexual Activity   Drug Use No     Social History     Tobacco Use   Smoking Status Never   Smokeless Tobacco Never   Tobacco Comments    no exposure to passive smoke     Family History   Problem Relation Age of Onset   • Liver cancer Mother    • Hyperlipidemia Father    • Heart attack Father    • Heart disease Maternal Grandfather        Meds/Allergies   all current active meds have been reviewed, current meds:   Current Facility-Administered Medications   Medication Dose Route Frequency   • acetaminophen (TYLENOL) tablet 650 mg  650 mg Oral Q6H PRN   • cefTRIAXone (ROCEPHIN) IVPB (premix in dextrose) 1,000 mg 50 mL  1,000 mg Intravenous Q24H   • diazepam (VALIUM) tablet 2 mg  2 mg Oral Q8H PRN   • docusate sodium (COLACE) capsule 100 mg  100 mg Oral BID   • heparin (porcine) subcutaneous injection 5,000 Units  5,000 Units Subcutaneous Q8H [address]   • levothyroxine tablet 88 mcg  88 mcg Oral Early Morning   • metroNIDAZOLE (FLAGYL) IVPB (premix) 500 mg 100 mL  500 mg Intravenous Q8H   • NIFEdipine (PROCARDIA XL) 24 hr tablet 60 mg  60 mg Oral Daily   • ondansetron (ZOFRAN) injection 4 mg  4 mg Intravenous Q6H PRN   • polyethylene glycol (MIRALAX) packet 17 g  17 g Oral Daily   • pravastatin (PRAVACHOL) tablet 40 mg  40 mg Oral Daily With Dinner   • senna (SENOKOT) tablet 8 6 mg  1 tablet Oral Daily   • sodium chloride 0 9 % infusion  100 mL/hr Intravenous Continuous    and PTA meds:    Medications Prior to Admission   Medication   • diazepam (VALIUM) 2 mg tablet   • furosemide (LASIX) 20 mg tablet   • glyBURIDE micronized (GLYNASE) 3 mg tablet   • levothyroxine (Euthyrox) 88 mcg tablet   • lisinopril (ZESTRIL) 40 mg tablet   • lovastatin (MEVACOR) 40 MG tablet   • NIFEdipine ER (ADALAT CC) 60 MG 24 hr tablet   • ondansetron (ZOFRAN-ODT) 4 mg disintegrating tablet   • Blood Glucose Monitoring Suppl (ONETOUCH VERIO FLEX SYSTEM) w/Device KIT   • glucose blood test strip   • Lancets (onetouch ultrasoft) lancets       Allergies   Allergen Reactions   • Codeine      Other reaction(s): GI problems   • Hydrochlorothiazide      Other reaction(s): HYPONATREMIA   • Hydrocodone GI Intolerance   • Ibuprofen Other (See Comments)     Pr report told to not take due to kidney problems   • Oxycodone      Other reaction(s): GI       Objective     Intake/Output Summary (Last 24 hours) at 2/19/2023 0827  Last data filed at 2/19/2023 0645  Gross per 24 hour   Intake 1540 ml   Output 225 ml   Net 1315 ml     Patient Vitals for the past 24 hrs:   BP Temp Temp src Pulse Resp SpO2 Height Weight   02/19/23 0755 126/68 98 °F (36 7 °C) Oral 84 16 92 % -- --   02/19/23 0600 -- -- -- -- -- -- -- 64 9 kg (143 lb 1 3 oz)   02/18/23 2318 113/63 98 1 °F (36 7 °C) -- -- 18 -- -- --   02/18/23 1502 113/66 (!) 97 4 °F (36 3 °C) Oral 85 18 93 % 4' 9" (1 448 m) --   02/18/23 0915 -- -- -- -- -- 94 % -- --       Invasive Devices:      Vitals:    02/19/23 0755   BP: 126/68   Pulse: 84   Resp: 16   Temp: 98 °F (36 7 °C)   SpO2: 92%     General: Well-developed well-nourished no acute distress slightly weak appearing  Skin: No acute rash  Eyes: No scleral icterus noninjected  ENT: Normal cephalic/atraumatic mucous membranes slightly dry  Neck: Supple, no jugular venous distention, no carotid bruits, 1+ carotid upstroke, no thyromegaly and trachea midline  Back: No CVA tenderness  Chest: Clear to auscultation percussion, no use of accessory respiratory muscles and good respiratory effort  CVS: Regular rate and rhythm without a murmur rub or gallops appreciable  Abdomen: Soft and nontender with normal bowel sounds, hepatosplenomegaly or bruits appreciable  Extremities: No clubbing, no cyanosis, 2+ edema 2 through 2 at the pretibial region no pedal edema, this is pitting; and equal bilaterally; poor distal pulses, some moderate arthritic changes  Neuro: No gross focality  Psych: Alert oriented and appropriate    Current Weight: Weight - Scale: 64 9 kg (143 lb 1 3 oz)  First Weight: Weight - Scale: 62 kg (136 lb 11 oz)    Lab Results:  I have personally reviewed pertinent labs    CBC:   Lab Results   Component Value Date    WBC 6 37 02/19/2023    WBC 10 0 05/24/2018    RBC 2 29 (L) 02/19/2023    RBC 3 73 (L) 05/24/2018     CMP:   Lab Results   Component Value Date     05/24/2018     (H) 02/19/2023     05/24/2018    CO2 19 (L) 02/19/2023    CO2 22 05/24/2018    ANIONGAP 13 05/24/2018    BUN 51 (H) 02/19/2023    BUN 29 (H) 05/24/2018    CREATININE 1 86 (H) 02/19/2023    GLUCOSE 205 (H) 05/24/2018    CALCIUM 6 3 (L) 02/19/2023    CALCIUM 9 3 05/24/2018    AST 9 (L) 02/19/2023    ALT 9 02/19/2023    ALKPHOS 46 02/19/2023    EGFR 23 02/19/2023     Phosphorus: No results found for: PHOS  Magnesium:   Lab Results   Component Value Date    MG 1 7 (L) 02/18/2023     Urinalysis:   Lab Results   Component Value Date    COLORU Yellow 02/18/2023    CLARITYU Clear 02/18/2023    SPECGRAV 1 025 02/18/2023    PHUR 5 0 02/18/2023    LEUKOCYTESUR Negative 02/18/2023    NITRITE Negative 02/18/2023    GLUCOSEU Negative 02/18/2023    KETONESU Negative 02/18/2023    BILIRUBINUR Small (A) 02/18/2023    BLOODU Negative 02/18/2023     BMP:   Lab Results   Component Value Date    GLUCOSE 205 (H) 05/24/2018    SODIUM 138 02/19/2023    CO2 19 (L) 02/19/2023    CO2 22 05/24/2018    BUN 51 (H) 02/19/2023    BUN 29 (H) 05/24/2018    CREATININE 1 86 (H) 02/19/2023    CALCIUM 6 3 (L) 02/19/2023    CALCIUM 9 3 05/24/2018     ABGs: No results found for: [facility]  Radiology review:     · Chest x-ray: Subsegmental atelectasis versus infiltrate at the right base and elevated right hemidiaphragm small bilateral pleural effusions  · CT scan: No hydronephrosis, right lower lobe infiltrate small bilateral effusions; stool impaction in the rectum with changes of stercoral colitis bladder wall thickening rule out UTI        Assessment/Plan   80y o  year old female with a PMH of diabetes mellitus type 2/hypertension/hypothyroidism who presents with diarrhea/watery for a couple of days occasionally incontinent of stool  Patient also found to have potential sepsis with right lower lobe pneumonia and, stercoral colitis with stool impaction  We are asked to see the patient for acute on top of chronic kidney disease    1  AK I/POA:  Etiology: Prerenal in the setting of diuretics and also autoregulatory abnormality with lisinopril/relative hypotension/sepsis which may be leading to ATN  • Current creatinine: 1 86 improved  • Peak creatinine: 2 41 on 2/18  • Admission creatinine 2 39 on 2/17  • Baseline creatinine 1 45 please see below  • UA: Trace protein/negative heme negative nitrates, no RBCs, 0-1 WBCs  • Renal imaging : CT without hydronephrosis  Treatment:  • IV fluids  • Hold ACE inhibitor/diuretics  • Maintain MAP:  Over 65 mmHg if possible/avoid hypoperfusion:  Hold parameters on blood pressure medications  • Avoid nephrotoxic agents such as NSAIDs, and IV contrast if possible    2  CKD: 3B  • Baseline creatinine: 1 45 as of 1/26/2023  • Etiology: Diabetic kidney disease associate with microalbuminuria in the past/hypertensive nephrosclerosis/arteriolar nephrosclerosis/possible cardiorenal syndrome with lower extremity edema on relatively high-dose lisinopril and furosemide  • Has not seen nephrology as an outpatient will need follow-up upon discharge    3  Volume/hypertension:  • Volume: Prerenal: Hold ACE inhibitor and IV fluids as noted below  • Blood pressure: Low normal in the systolic range of 476  • Lower extremity edema: Differential would include high-dose Procardia XL 60 mg rule out cardiac, rule out DVT, rule out hypothyroidism  Possible mild component of hypoalbuminemia   • Hold Procardia XL  • Thyroid profile: TSH 21 14: Check free T4 per primary service: Apparently thyroid supplement was recently increased to 88 mcg at the end of January to have follow-up TSH in 3 to 4 weeks  • Echocardiogram  • Venous duplex  Treatment:  · For now leg elevation/low-sodium diet and hold Procardia XL and potentially adjust and treat the hypothyroidism per primary service      4  Electrolytes/fluid and electrolyte:  • Metabolic acidosis: Non anion gap compatible with the diarrhea  I will place on IV bicarbonate fluids  • Replete borderline potassium of 3 7      5  MBD of CKD/AK I:  • Recheck magnesium status post treatment with borderline low magnesium    6  Anemia:  • Current hemoglobin: 10 1  • Given anemia and CKD rule out multiple myeloma with an SPEP UPEP light chain ratio  • Check iron studies  • Stool for occult blood  Treatment:  • Transfuse for hemoglobin less than 7 0 per primary service      7  Major reason for hospitalization:  o Sepsis: Stercoral colitis/right lower lobe pneumonia/possible UTI per primary service    8  Other problems:  o Diabetes mellitus type 2  o Carcinoid tumor  o Dyslipidemia  o Hypothyroidism    Discussed with LO in particular regarding the metabolic acidosis and EILEEN: As a consequence/result we will be switching IV fluids to bicarbonate containing fluids  Also discussed lower extremity edema will order venous duplex as a result      Portions of the record may have been created with voice recognition software  Occasional wrong word or "sound a like" substitutions may have occurred due to the inherent limitations of voice recognition software  Read the chart carefully and recognize, using context, where substitutions have occurred

## 2023-02-19 NOTE — PHYSICAL THERAPY NOTE
PT EVALUATION    Pt  Name: Lou Yoder  Pt  Age: 80 y o  MRN: [id number]  LENGTH OF STAY: 2    Patient Active Problem List   Diagnosis    Anxiety    Osteoarthritis    Type 2 diabetes mellitus with diabetic neuropathy, without long-term current use of insulin (HCC)    Hyperlipidemia    Hypertension    Hypothyroid    Microalbuminuria    Neuropathy    Vertigo    Anemia    Basal cell carcinoma (BCC) of neck    Stage 3a chronic kidney disease (HCC)    Stercoral colitis    Sepsis ([address] )    Pneumonia    EILEEN (acute kidney injury) ([facility] [address] )    Lower extremity edema       Admitting Diagnoses:   Diarrhea [R19 7]  Weakness [R53 1]  Pleural effusion [J90]  EILEEN (acute kidney injury) ([address] ) [N17 9]  Sepsis ([facility] [address] ) [A41 9]  Right lower lobe pulmonary infiltrate [R91 8]  Stercoral colitis [K52 89]    Past Medical History:   Diagnosis Date    Abnormal mammogram 2008    Anemia 12/22/2020    Anxiety 5/15/2014    Basal cell carcinoma (BCC) of neck 6/23/2021    Carcinoid tumor     Diabetes mellitus ([facility] [address] )     Disease of thyroid gland     High cholesterol     Hyperlipidemia     Hypertension     Osteoarthritis 7/24/2008    Pneumonia 2/17/2023    Sciatica 2008    Stage 3a chronic kidney disease ([facility] [address] ) 1/12/2022       Past Surgical History:   Procedure Laterality Date    APPENDECTOMY      incidental finding at appendectomy    Jaxon Paul W/ BILATERAL SALPINGOOPHORECTOMY      benign    TUMOR REMOVAL      carcinoid tumor surgery       Imaging Studies:  XR chest 2 views   Final Result by Renae Joyce MD (02/18 7568)      Subsegmental atelectasis vs  infiltrate right lung base  Elevated right hemidiaphragm  Small bilateral pleural effusions                     Workstation performed: [id number]         CT abdomen pelvis wo contrast   Final Result by Galindo West MD (02/17 4513)      1  Suggestion for right lower lobe infiltrate with bilateral small effusions        2  Stool impaction in the rectum with changes of stercoral colitis  Small amount of presacral fluid  Stool throughout the colon  3  Bladder wall thickening  Correlate for cystitis with urinalysis  Workstation performed: [id number]         VAS lower limb venous duplex study, complete bilateral    (Results Pending)        02/19/23 0909   PT Last Visit   PT Visit Date 02/19/23   Note Type   Note type Evaluation   Pain Assessment   Pain Assessment Tool 0-10   Pain Score No Pain   Restrictions/Precautions   Weight Bearing Precautions Per Order No   Other Precautions Contact/isolation; Chair Alarm; Bed Alarm;Multiple lines; Fall Risk  ([place])   Home Living   Type of Home House  [place])   Home Layout One level;Performs ADLs on one level; Able to live on main level with bedroom/bathroom; Ramped entrance   Bathroom Shower/Tub Tub/shower unit   [employer] Raised   Bathroom Equipment Grab bars in shower;Built-in shower seat   Bathroom Accessibility Accessible   Home Equipment Walker;Cane   Prior Function   Level of [place] Independent with ADLs; Independent with functional mobility; Independent with IADLS   Lives With Daughter  (& SUSAN)   Receives Help From Family   IADLs Independent with meal prep; Independent with medication management; Family/Friend/Other provides transportation   Falls in the last 6 months 1 to 4  (1x)   Vocational Retired   Comments PTA use of RW in home and [facility] for short distances (-) ; family normally always home however pt reports if they leave she will stay in her chair until family returns   General   Family/Caregiver Present No   Cognition   Overall Cognitive Status WFL   Arousal/Participation Alert   Orientation Level Oriented X4   Following Commands Follows one step commands without difficulty   Comments Cooperative   Subjective   Subjective Pt agreeable to PT evaluation   RUE Assessment   RUE Assessment WFL  (4-/5 grossly)   LUE Assessment   LUE Assessment WFL  (4-/5 grossly)   RLE Assessment   RLE Assessment WFL  (4-/5 grossly)   LLE Assessment   LLE Assessment WFL  (4-/5 grossly)   Light Touch   RLE Light Touch Impaired  (Neuropathy)   LLE Light Touch Impaired  (Neuropathy)   Bed Mobility   Supine to Sit 3  Moderate assistance   Additional items Assist x 1;Bedrails; Increased time required;Verbal cues;LE management   Sit to Supine 3  Moderate assistance   Additional items Assist x 1;Bedrails; Increased time required;Verbal cues;LE management   Additional Comments Cues for hand placement and safety   Transfers   Sit to Stand 4  Minimal assistance   Additional items Assist x 1; Increased time required;Verbal cues   Stand to Sit 4  Minimal assistance   Additional items Assist x 1; Increased time required;Verbal cues   Additional Comments Cues for technique and safety  Ambulation/Elevation   Gait pattern Poor UE support; Improper Weight shift;Narrow CLAUDIA; Decreased foot clearance; Short stride; Step to;Excessively slow   Gait Assistance 3  Moderate assist   Additional items Assist x 1;Verbal cues; Tactile cues   Assistive Device Rolling walker   Distance 2 side steps to [facility]   Ambulation/Elevation Additional Comments Cues for RW management and safety   Balance   Static Sitting Fair +   Dynamic Sitting Fair -   Static Standing Poor +   Dynamic Standing Poor   Ambulatory Poor   Activity Tolerance   Activity Tolerance Treatment limited secondary to medical complications (Comment); Other (Comment)  (Incontinence)   Nurse Made Aware RN Tamy Litter   Assessment   Prognosis Good   Problem List Decreased strength;Decreased endurance;Decreased mobility; Impaired balance; Impaired sensation   Assessment Pt  89 y  o female presents with diarrhea  Past medical hx includes anemia, anxiety, basal cell carcinoma, carcinoid tumor, DM, high cholesterol, hyperlipidemia, HTN, OA, sciatica and stage 3 CKD   Pt admitted for Sepsis [facility]) w/ Diarrhea, Weakness, Pleural effusion, EILEEN, Sepsis, Right lower lobe pulmonary infiltrate, Stercoral colitis  Pt referred to PT for functional mobility evaluation & D/C planning w/ orders of up w/ assistance  PTA, pt reports being independent with use of RW in home, and [facility] for short distances   During evaluation, deficits included dec mobility, balance, ambulation  Please see flow sheet above for objective findings and level of assistance required for safe completion of functional tasks  Pt demonstrated dec endurance and tolerance to activity  Required modAx1 for bed mobility, minAx1 for sit<>stand transfers, and modAx1 for ambulation  Pt performed 4 trials of sit<>stand tranfers; incontinent with each stand requiring pericare from therapist  Pt was able to side step 2 steps towards [facility] however remained incontinent  No pain reported but had inc soreness to buttock  Denies reports of dizziness or SOB t/o session  Pt was educated on fall precautions and reinforced w/ good understanding  Pt would benefit from continued PT to address deficits as defined above and maximize level of independence with functional mobility and safety  The patient's AM-PAC Basic Mobility Inpatient Short Form Raw Score is 12  A Raw score of less than or equal to 16 suggests the patient may benefit from discharge to post-acute rehabilitation services  Please also refer to the recommendation of the Physical Therapist for safe discharge planning  From PT/mobility standpoint recommendation for D/C to STR, when medically cleared based on objective measures above, current function  CM to follow  [facility] staff to continue to mobilized pt (OOB in chair for all meals & ambulate in room/unit) as tolerated to prevent further decline in function  Nsg notified     Goals   Patient Goals to feel better   STG Expiration Date 03/05/23   Short Term Goal #1 1) Inc overall LE strength by 1/2 MMT grade to improve functional mobility; 2) Pt will demonstrate improved bed mobility with minAx1 to dec caregiver burden; 3) Pt will demonstrate improved transfers w/ S for inc safety; 4) Pt will be able to amb w/ S >150' w/ RW for household distances to inc safety and dec caregiver burden; 5) Improve general balance by 1 grade to inc safety; 6) PT for ongoing patient and caregiver education   PT Treatment Day 0   Plan   Treatment/Interventions Functional transfer training;LE strengthening/ROM; Therapeutic exercise; Endurance training;Patient/family training;Bed mobility;Gait training;Spoke to nursing;OT   PT Frequency 3-5x/wk   Recommendation   PT Discharge Recommendation Post acute rehabilitation services   Equipment Recommended Walker  (Pt owns walker)   AM-PAC Basic Mobility Inpatient   Turning in Flat Bed Without Bedrails 2   Lying on Back to Sitting on Edge of Flat Bed Without Bedrails 2   Moving Bed to Chair 2   Standing Up From Chair Using Arms 3   Walk in Room 2   Climb 3-5 Stairs With Railing 1   Basic Mobility Inpatient Raw Score 12   Basic Mobility Standardized Score 32 23   Highest Level Of Mobility   -[facility] Goal 4: Move to chair/commode   -HL Achieved 5: Stand (1 or more minutes)   End of Consult   Patient Position at End of Consult Supine;Bed/Chair alarm activated; All needs within reach   End of Consult Comments Pt in stable condition at end of session  RN notified     Hx/personal factors: co-morbidities, advanced age, mutliple lines, use of AD, h/o of falls, fall risk, and assist w/ ADL's  Examination: dec mobility, dec balance, dec endurance, dec amb, risk for falls, assessed body system, balance, endurance, amb, D/C disposition & fall risk, impairements in locomotion, musculoskeletal, balance, endurance, posture, coordination  Clinical: unpredictable (ongoing medical status, abnormal lab values, and risk for falls)  Complexity: high      Baldemar Gallegos, PT

## 2023-02-19 NOTE — PLAN OF CARE
Problem: Potential for Falls  Goal: Patient will remain free of falls  Description: INTERVENTIONS:  - Educate patient/family on patient safety including physical limitations  - Instruct patient to call for assistance with activity   - Consult OT/PT to assist with strengthening/mobility   - Keep Call bell within reach  - Keep bed low and locked with side rails adjusted as appropriate  - Keep care items and personal belongings within reach  - Initiate and maintain comfort rounds  - Make Fall Risk Sign visible to staff  - Offer Toileting every 2 Hours, in advance of need  - Initiate/Maintain bed alarm  - Obtain necessary fall risk management equipment: alarms  - Apply yellow socks and bracelet for high fall risk patients  - Consider moving patient to room near nurses station  Outcome: Progressing     Problem: MOBILITY - ADULT  Goal: Maintain or return to baseline ADL function  Description: INTERVENTIONS:  -  Assess patient's ability to carry out ADLs; assess patient's baseline for ADL function and identify physical deficits which impact ability to perform ADLs (bathing, care of mouth/teeth, toileting, grooming, dressing, etc )  - Assess/evaluate cause of self-care deficits   - Assess range of motion  - Assess patient's mobility; develop plan if impaired  - Assess patient's need for assistive devices and provide as appropriate  - Encourage maximum independence but intervene and supervise when necessary  - Involve family in performance of ADLs  - Assess for home care needs following discharge   - Consider OT consult to assist with ADL evaluation and planning for discharge  - Provide patient education as appropriate  Outcome: Progressing  Goal: Maintains/Returns to pre admission functional level  Description: INTERVENTIONS:  - Perform BMAT or MOVE assessment daily    - Set and communicate daily mobility goal to care team and patient/family/caregiver     - Collaborate with rehabilitation services on mobility goals if consulted  - Perform Range of Motion 4 times a day  - Reposition patient every 2 hours  - Dangle patient 4 times a day  - Stand patient 4 times a day  - Ambulate patient 4 times a day  - Out of bed to chair 4 times a day   - Out of bed for meals 3 times a day  - Out of bed for toileting  - Record patient progress and toleration of activity level   Outcome: Progressing     Problem: GASTROINTESTINAL - ADULT  Goal: Maintains or returns to baseline bowel function  Description: INTERVENTIONS:  - Assess bowel function  - Encourage oral fluids to ensure adequate hydration  - Administer IV fluids if ordered to ensure adequate hydration  - Administer ordered medications as needed  - Encourage mobilization and activity  - Consider nutritional services referral to assist patient with adequate nutrition and appropriate food choices  Outcome: Progressing     Problem: Knowledge Deficit  Goal: Patient/family/caregiver demonstrates understanding of disease process, treatment plan, medications, and discharge instructions  Description: Complete learning assessment and assess knowledge base    Interventions:  - Provide teaching at level of understanding  - Provide teaching via preferred learning methods  Outcome: Progressing     Problem: Prexisting or High Potential for Compromised Skin Integrity  Goal: Skin integrity is maintained or improved  Description: INTERVENTIONS:  - Identify patients at risk for skin breakdown  - Assess and monitor skin integrity  - Assess and monitor nutrition and hydration status  - Monitor labs   - Assess for incontinence   - Turn and reposition patient  - Assist with mobility/ambulation  - Relieve pressure over bony prominences  - Avoid friction and shearing  - Provide appropriate hygiene as needed including keeping skin clean and dry  - Evaluate need for skin moisturizer/barrier cream  - Collaborate with interdisciplinary team   - Patient/family teaching  - Consider wound care consult   Outcome: Progressing     Problem: INFECTION - ADULT  Goal: Absence or prevention of progression during hospitalization  Description: INTERVENTIONS:  - Assess and monitor for signs and symptoms of infection  - Monitor lab/diagnostic results  - Monitor all insertion sites, i e  indwelling lines, tubes, and drains  - Monitor endotracheal if appropriate and nasal secretions for changes in amount and color  - [place] appropriate cooling/warming therapies per order  - Administer medications as ordered  - Instruct and encourage patient and family to use good hand hygiene technique  - Identify and instruct in appropriate isolation precautions for identified infection/condition  Outcome: Progressing     Problem: SAFETY ADULT  Goal: Patient will remain free of falls  Description: INTERVENTIONS:  - Educate patient/family on patient safety including physical limitations  - Instruct patient to call for assistance with activity   - Consult OT/PT to assist with strengthening/mobility   - Keep Call bell within reach  - Keep bed low and locked with side rails adjusted as appropriate  - Keep care items and personal belongings within reach  - Initiate and maintain comfort rounds  - Make Fall Risk Sign visible to staff  - Offer Toileting every 2 Hours, in advance of need  - Initiate/Maintain bed alarm  - Obtain necessary fall risk management equipment: alarms  - Apply yellow socks and bracelet for high fall risk patients  - Consider moving patient to room near nurses station  Outcome: Progressing  Goal: Maintain or return to baseline ADL function  Description: INTERVENTIONS:  -  Assess patient's ability to carry out ADLs; assess patient's baseline for ADL function and identify physical deficits which impact ability to perform ADLs (bathing, care of mouth/teeth, toileting, grooming, dressing, etc )  - Assess/evaluate cause of self-care deficits   - Assess range of motion  - Assess patient's mobility; develop plan if impaired  - Assess patient's need for assistive devices and provide as appropriate  - Encourage maximum independence but intervene and supervise when necessary  - Involve family in performance of ADLs  - Assess for home care needs following discharge   - Consider OT consult to assist with ADL evaluation and planning for discharge  - Provide patient education as appropriate  Outcome: Progressing  Goal: Maintains/Returns to pre admission functional level  Description: INTERVENTIONS:  - Perform BMAT or MOVE assessment daily    - Set and communicate daily mobility goal to care team and patient/family/caregiver  - Collaborate with rehabilitation services on mobility goals if consulted  - Perform Range of Motion 4 times a day  - Reposition patient every 2 hours    - Dangle patient 4 times a day  - Stand patient 4 times a day  - Ambulate patient 4 times a day  - Out of bed to chair 4 times a day   - Out of bed for meals 3 times a day  - Out of bed for toileting  - Record patient progress and toleration of activity level   Outcome: Progressing     Problem: DISCHARGE PLANNING  Goal: Discharge to home or other facility with appropriate resources  Description: INTERVENTIONS:  - Identify barriers to discharge w/patient and caregiver  - Arrange for needed discharge resources and transportation as appropriate  - Identify discharge learning needs (meds, wound care, etc )  - Arrange for interpretive services to assist at discharge as needed  - Refer to Case Management Department for coordinating discharge planning if the patient needs post-hospital services based on physician/advanced practitioner order or complex needs related to functional status, cognitive ability, or social support system  Outcome: Progressing     Problem: METABOLIC, FLUID AND ELECTROLYTES - ADULT  Goal: Fluid balance maintained  Description: INTERVENTIONS:  - Monitor labs   - Monitor I/O and WT  - Instruct patient on fluid and nutrition as appropriate  - Assess for signs & symptoms of volume excess or deficit  Outcome: Progressing     Problem: SKIN/TISSUE INTEGRITY - ADULT  Goal: Skin Integrity remains intact(Skin Breakdown Prevention)  Description: Assess:  -Perform [place] assessment every shift  -Clean and moisturize skin every shift  -Inspect skin when repositioning, toileting, and assisting with ADLS  -Assess under medical devices such as IV every shift  -Assess extremities for adequate circulation and sensation     Bed Management:  -Have minimal linens on bed & keep smooth, unwrinkled  -Change linens as needed when moist or perspiring  -Avoid sitting or lying in one position for more than 2 hours while in bed  -Keep HOB at 30 degrees     Toileting:  -Offer bedside commode  -Assess for incontinence every shift  -Use incontinent care products after each incontinent episode such as foam cleanser    Activity:  -Mobilize patient 3 times a day  -Encourage activity and walks on unit  -Encourage or provide ROM exercises   -Turn and reposition patient every 2 Hours  -Use appropriate equipment to lift or move patient in bed  -Instruct/ Assist with weight shifting every hour when out of bed in chair  -Consider limitation of chair time 2 hour intervals    Skin Care:  -Avoid use of baby powder, tape, friction and shearing, hot water or constrictive clothing  -Relieve pressure over bony prominences using pillows  -Do not massage red bony areas    Next Steps:  -Teach patient strategies to minimize risks such as skin breakdown   -Consider consults to  interdisciplinary teams such as wound care  Outcome: Progressing

## 2023-02-20 ENCOUNTER — APPOINTMENT (INPATIENT)
Dept: RADIOLOGY | Facility: HOSPITAL | Age: 88
End: 2023-02-20

## 2023-02-20 ENCOUNTER — APPOINTMENT (INPATIENT)
Dept: NON INVASIVE DIAGNOSTICS | Facility: HOSPITAL | Age: 88
End: 2023-02-20

## 2023-02-20 PROBLEM — E83.51 HYPOCALCEMIA: Status: ACTIVE | Noted: 2023-01-01

## 2023-02-20 LAB
ALBUMIN SERPL BCP-MCNC: 2.3 G/DL (ref 3.5–5)
ALP SERPL-CCNC: 42 U/L (ref 34–104)
ALT SERPL W P-5'-P-CCNC: 8 U/L (ref 7–52)
ANION GAP SERPL CALCULATED.3IONS-SCNC: 5 MMOL/L (ref 4–13)
AST SERPL W P-5'-P-CCNC: 7 U/L (ref 13–39)
BASOPHILS # BLD AUTO: 0.02 THOUSANDS/ÂΜL (ref 0–0.1)
BASOPHILS NFR BLD AUTO: 0 % (ref 0–1)
BILIRUB SERPL-MCNC: 0.25 MG/DL (ref 0.2–1)
BUN SERPL-MCNC: 40 MG/DL (ref 5–25)
C DIFF TOX GENS STL QL NAA+PROBE: NEGATIVE
CALCIUM ALBUM COR SERPL-MCNC: 7.3 MG/DL (ref 8.3–10.1)
CALCIUM SERPL-MCNC: 5.9 MG/DL (ref 8.4–10.2)
CHLORIDE SERPL-SCNC: 120 MMOL/L (ref 96–108)
CO2 SERPL-SCNC: 16 MMOL/L (ref 21–32)
CREAT SERPL-MCNC: 1.32 MG/DL (ref 0.6–1.3)
DATE SPECIMEN #1: ABNORMAL
DATE SPECIMEN #2: ABNORMAL
DATE SPECIMEN #3: ABNORMAL
EOSINOPHIL # BLD AUTO: 0.05 THOUSAND/ÂΜL (ref 0–0.61)
EOSINOPHIL NFR BLD AUTO: 1 % (ref 0–6)
ERYTHROCYTE [DISTWIDTH] IN BLOOD BY AUTOMATED COUNT: 13.1 % (ref 11.6–15.1)
FERRITIN SERPL-MCNC: 97 NG/ML (ref 8–388)
GFR SERPL CREATININE-BSD FRML MDRD: 35 ML/MIN/1.73SQ M
GLUCOSE SERPL-MCNC: 94 MG/DL (ref 65–140)
HCT VFR BLD AUTO: 30.9 % (ref 34.8–46.1)
HEMOCCULT SP1 STL QL: POSITIVE
HEMOCCULT SP2 STL QL: POSITIVE
HEMOCCULT SP3 STL QL: POSITIVE
HGB BLD-MCNC: 9.3 G/DL (ref 11.5–15.4)
IMM GRANULOCYTES # BLD AUTO: 0.04 THOUSAND/UL (ref 0–0.2)
IMM GRANULOCYTES NFR BLD AUTO: 1 % (ref 0–2)
IRON SATN MFR SERPL: 7 % (ref 15–50)
IRON SERPL-MCNC: 14 UG/DL (ref 50–170)
LYMPHOCYTES # BLD AUTO: 1.12 THOUSANDS/ÂΜL (ref 0.6–4.47)
LYMPHOCYTES NFR BLD AUTO: 18 % (ref 14–44)
MAGNESIUM SERPL-MCNC: 1.6 MG/DL (ref 1.9–2.7)
MCH RBC QN AUTO: 29 PG (ref 26.8–34.3)
MCHC RBC AUTO-ENTMCNC: 30.1 G/DL (ref 31.4–37.4)
MCV RBC AUTO: 96 FL (ref 82–98)
MONOCYTES # BLD AUTO: 0.46 THOUSAND/ÂΜL (ref 0.17–1.22)
MONOCYTES NFR BLD AUTO: 7 % (ref 4–12)
NEUTROPHILS # BLD AUTO: 4.61 THOUSANDS/ÂΜL (ref 1.85–7.62)
NEUTS SEG NFR BLD AUTO: 73 % (ref 43–75)
NRBC BLD AUTO-RTO: 0 /100 WBCS
PLATELET # BLD AUTO: 183 THOUSANDS/UL (ref 149–390)
PMV BLD AUTO: 11.1 FL (ref 8.9–12.7)
POTASSIUM SERPL-SCNC: 3.6 MMOL/L (ref 3.5–5.3)
PROCALCITONIN SERPL-MCNC: 14.61 NG/ML
PROT SERPL-MCNC: 4.1 G/DL (ref 6.4–8.4)
RBC # BLD AUTO: 3.21 MILLION/UL (ref 3.81–5.12)
SODIUM SERPL-SCNC: 141 MMOL/L (ref 135–147)
T4 FREE SERPL-MCNC: 1.18 NG/DL (ref 0.76–1.46)
TIBC SERPL-MCNC: 204 UG/DL (ref 250–450)
WBC # BLD AUTO: 6.3 THOUSAND/UL (ref 4.31–10.16)

## 2023-02-20 RX ORDER — FUROSEMIDE 10 MG/ML
20 INJECTION INTRAMUSCULAR; INTRAVENOUS ONCE
Status: COMPLETED | OUTPATIENT
Start: 2023-02-20 | End: 2023-02-20

## 2023-02-20 RX ORDER — MAGNESIUM SULFATE HEPTAHYDRATE 40 MG/ML
4 INJECTION, SOLUTION INTRAVENOUS ONCE
Status: COMPLETED | OUTPATIENT
Start: 2023-02-20 | End: 2023-02-20

## 2023-02-20 RX ADMIN — SENNOSIDES 8.6 MG: 8.6 TABLET ORAL at 08:30

## 2023-02-20 RX ADMIN — METRONIDAZOLE 500 MG: 500 INJECTION, SOLUTION INTRAVENOUS at 01:48

## 2023-02-20 RX ADMIN — FUROSEMIDE 20 MG: 10 INJECTION, SOLUTION INTRAVENOUS at 19:27

## 2023-02-20 RX ADMIN — DOCUSATE SODIUM 100 MG: 100 CAPSULE, LIQUID FILLED ORAL at 16:42

## 2023-02-20 RX ADMIN — POLYETHYLENE GLYCOL 3350 17 G: 17 POWDER, FOR SOLUTION ORAL at 08:30

## 2023-02-20 RX ADMIN — SODIUM CHLORIDE 100 ML/HR: 0.9 INJECTION, SOLUTION INTRAVENOUS at 01:46

## 2023-02-20 RX ADMIN — MAGNESIUM SULFATE HEPTAHYDRATE 4 G: 40 INJECTION, SOLUTION INTRAVENOUS at 10:58

## 2023-02-20 RX ADMIN — HEPARIN SODIUM 5000 UNITS: 5000 INJECTION INTRAVENOUS; SUBCUTANEOUS at 13:10

## 2023-02-20 RX ADMIN — SODIUM CHLORIDE 100 ML/HR: 0.9 INJECTION, SOLUTION INTRAVENOUS at 10:59

## 2023-02-20 RX ADMIN — HEPARIN SODIUM 5000 UNITS: 5000 INJECTION INTRAVENOUS; SUBCUTANEOUS at 06:24

## 2023-02-20 RX ADMIN — PRAVASTATIN SODIUM 40 MG: 40 TABLET ORAL at 16:42

## 2023-02-20 RX ADMIN — HEPARIN SODIUM 5000 UNITS: 5000 INJECTION INTRAVENOUS; SUBCUTANEOUS at 22:14

## 2023-02-20 RX ADMIN — METRONIDAZOLE 500 MG: 500 INJECTION, SOLUTION INTRAVENOUS at 10:58

## 2023-02-20 RX ADMIN — FUROSEMIDE 20 MG: 10 INJECTION, SOLUTION INTRAVENOUS at 13:10

## 2023-02-20 RX ADMIN — DOCUSATE SODIUM 100 MG: 100 CAPSULE, LIQUID FILLED ORAL at 08:30

## 2023-02-20 RX ADMIN — METRONIDAZOLE 500 MG: 500 INJECTION, SOLUTION INTRAVENOUS at 17:37

## 2023-02-20 RX ADMIN — LEVOTHYROXINE SODIUM 100 MCG: 100 TABLET ORAL at 06:24

## 2023-02-20 RX ADMIN — CEFTRIAXONE 1000 MG: 1 INJECTION, SOLUTION INTRAVENOUS at 16:41

## 2023-02-20 NOTE — ASSESSMENT & PLAN NOTE
· continue on nifedipine 60 mg daily  · Lisinopril and furosemide were held due to EILEEN  · Now with resolution of EILEEN IV fluids discontinued, patient given IV Lasix 20 mg

## 2023-02-20 NOTE — PLAN OF CARE
Problem: Potential for Falls  Goal: Patient will remain free of falls  Description: INTERVENTIONS:  - Educate patient/family on patient safety including physical limitations  - Instruct patient to call for assistance with activity   - Consult OT/PT to assist with strengthening/mobility   - Keep Call bell within reach  - Keep bed low and locked with side rails adjusted as appropriate  - Keep care items and personal belongings within reach  - Initiate and maintain comfort rounds  - Make Fall Risk Sign visible to staff  - Offer Toileting every 2 Hours, in advance of need  - Initiate/Maintain bed, chair alarm  - Obtain necessary fall risk management equipment: bed rails, alarm  - Apply yellow socks and bracelet for high fall risk patients  - Consider moving patient to room near nurses station  Outcome: Progressing     Problem: MOBILITY - ADULT  Goal: Maintain or return to baseline ADL function  Description: INTERVENTIONS:  -  Assess patient's ability to carry out ADLs; assess patient's baseline for ADL function and identify physical deficits which impact ability to perform ADLs (bathing, care of mouth/teeth, toileting, grooming, dressing, etc )  - Assess/evaluate cause of self-care deficits   - Assess range of motion  - Assess patient's mobility; develop plan if impaired  - Assess patient's need for assistive devices and provide as appropriate  - Encourage maximum independence but intervene and supervise when necessary  - Involve family in performance of ADLs  - Assess for home care needs following discharge   - Consider OT consult to assist with ADL evaluation and planning for discharge  - Provide patient education as appropriate  Outcome: Progressing  Goal: Maintains/Returns to pre admission functional level  Description: INTERVENTIONS:  - Perform BMAT or MOVE assessment daily    - Set and communicate daily mobility goal to care team and patient/family/caregiver     - Collaborate with rehabilitation services on mobility goals if consulted  - Reposition patient every 2 hours  - Dangle patient 3 times a day  - Stand patient 3 times a day  - Ambulate patient 2 times a day  - Out of bed to chair 2 times a day   - Out of bed for meals 2 times a day  - Out of bed for toileting  - Record patient progress and toleration of activity level   Outcome: Progressing     Problem: GASTROINTESTINAL - ADULT  Goal: Maintains or returns to baseline bowel function  Description: INTERVENTIONS:  - Assess bowel function  - Encourage oral fluids to ensure adequate hydration  - Administer IV fluids if ordered to ensure adequate hydration  - Administer ordered medications as needed  - Encourage mobilization and activity  - Consider nutritional services referral to assist patient with adequate nutrition and appropriate food choices  Outcome: Progressing     Problem: Knowledge Deficit  Goal: Patient/family/caregiver demonstrates understanding of disease process, treatment plan, medications, and discharge instructions  Description: Complete learning assessment and assess knowledge base    Interventions:  - Provide teaching at level of understanding  - Provide teaching via preferred learning methods  Outcome: Progressing     Problem: Prexisting or High Potential for Compromised Skin Integrity  Goal: Skin integrity is maintained or improved  Description: INTERVENTIONS:  - Identify patients at risk for skin breakdown  - Assess and monitor skin integrity  - Assess and monitor nutrition and hydration status  - Monitor labs   - Assess for incontinence   - Turn and reposition patient  - Assist with mobility/ambulation  - Relieve pressure over bony prominences  - Avoid friction and shearing  - Provide appropriate hygiene as needed including keeping skin clean and dry  - Evaluate need for skin moisturizer/barrier cream  - Collaborate with interdisciplinary team   - Patient/family teaching  - Consider wound care consult   Outcome: Progressing     Problem: INFECTION - ADULT  Goal: Absence or prevention of progression during hospitalization  Description: INTERVENTIONS:  - Assess and monitor for signs and symptoms of infection  - Monitor lab/diagnostic results  - Monitor all insertion sites, i e  indwelling lines, tubes, and drains  - Monitor endotracheal if appropriate and nasal secretions for changes in amount and color  - [place] appropriate cooling/warming therapies per order  - Administer medications as ordered  - Instruct and encourage patient and family to use good hand hygiene technique  - Identify and instruct in appropriate isolation precautions for identified infection/condition  Outcome: Progressing     Problem: SAFETY ADULT  Goal: Patient will remain free of falls  Description: INTERVENTIONS:  - Educate patient/family on patient safety including physical limitations  - Instruct patient to call for assistance with activity   - Consult OT/PT to assist with strengthening/mobility   - Keep Call bell within reach  - Keep bed low and locked with side rails adjusted as appropriate  - Keep care items and personal belongings within reach  - Initiate and maintain comfort rounds  - Make Fall Risk Sign visible to staff  - Offer Toileting every 2 Hours, in advance of need  - Initiate/Maintain bed, chair alarm  - Obtain necessary fall risk management equipment: bed rails  - Apply yellow socks and bracelet for high fall risk patients  - Consider moving patient to room near nurses station  Outcome: Progressing  Goal: Maintain or return to baseline ADL function  Description: INTERVENTIONS:  -  Assess patient's ability to carry out ADLs; assess patient's baseline for ADL function and identify physical deficits which impact ability to perform ADLs (bathing, care of mouth/teeth, toileting, grooming, dressing, etc )  - Assess/evaluate cause of self-care deficits   - Assess range of motion  - Assess patient's mobility; develop plan if impaired  - Assess patient's need for assistive devices and provide as appropriate  - Encourage maximum independence but intervene and supervise when necessary  - Involve family in performance of ADLs  - Assess for home care needs following discharge   - Consider OT consult to assist with ADL evaluation and planning for discharge  - Provide patient education as appropriate  Outcome: Progressing  Goal: Maintains/Returns to pre admission functional level  Description: INTERVENTIONS:  - Perform BMAT or MOVE assessment daily    - Set and communicate daily mobility goal to care team and patient/family/caregiver  - Collaborate with rehabilitation services on mobility goals if consulted  - Reposition patient every 2 hours    - Dangle patient 3 times a day  - Stand patient 3 times a day  - Ambulate patient 2 times a day  - Out of bed to chair 2 times a day   - Out of bed for meals 2 times a day  - Out of bed for toileting  - Record patient progress and toleration of activity level   Outcome: Progressing     Problem: DISCHARGE PLANNING  Goal: Discharge to home or other facility with appropriate resources  Description: INTERVENTIONS:  - Identify barriers to discharge w/patient and caregiver  - Arrange for needed discharge resources and transportation as appropriate  - Identify discharge learning needs (meds, wound care, etc )  - Arrange for interpretive services to assist at discharge as needed  - Refer to Case Management Department for coordinating discharge planning if the patient needs post-hospital services based on physician/advanced practitioner order or complex needs related to functional status, cognitive ability, or social support system  Outcome: Progressing     Problem: METABOLIC, FLUID AND ELECTROLYTES - ADULT  Goal: Fluid balance maintained  Description: INTERVENTIONS:  - Monitor labs   - Monitor I/O and WT  - Instruct patient on fluid and nutrition as appropriate  - Assess for signs & symptoms of volume excess or deficit  Outcome: Progressing Problem: SKIN/TISSUE INTEGRITY - ADULT  Goal: Skin Integrity remains intact(Skin Breakdown Prevention)  Description: Assess:  -Perform [place] assessment every shift  -Clean and moisturize skin as needed  -Inspect skin when repositioning, toileting, and assisting with ADLS  -Assess under medical devices such as masimo every 4 hrs  -Assess extremities for adequate circulation and sensation     Bed Management:  -Have minimal linens on bed & keep smooth, unwrinkled  -Change linens as needed when moist or perspiring  -Avoid sitting or lying in one position for more than 2 hours while in bed  -Keep HOB at >30 degrees     Toileting:  -Offer bedside commode  -Assess for incontinence every 2 hrs  -Use incontinent care products after each incontinent episode such as wet bath wipes  Activity:  -Mobilize patient 2 times a day  -Encourage activity and walks on unit  -Encourage or provide ROM exercises   -Turn and reposition patient every 2 Hours  -Use appropriate equipment to lift or move patient in bed  -Instruct/ Assist with weight shifting every 15 min when out of bed in chair  -Consider limitation of chair time to 2  hour intervals    Skin Care:  -Avoid use of baby powder, tape, friction and shearing, hot water or constrictive clothing  -Relieve pressure over bony prominences using allevyn foams  -Do not massage red bony areas    Next Steps:  -Teach patient strategies to minimize risks   -Consider consults to  interdisciplinary teams   Outcome: Progressing

## 2023-02-20 NOTE — ASSESSMENT & PLAN NOTE
· Patient presented with non-bloody diarrhea associated with poor p o  intake  · CT abdomen pelvis showing stool impaction in the rectum with changes of stercoral colitis, stool throughout colon  · Had a BM with soapsuds enema initially, no significant BMs since - will repeat with evidence of stool in rectal vault  · Continue Rocephin and Flagyl  · Bowel regiment with senna, Colace and MiraLAX  · Pain control as needed, avoid opioids  · C   Difficile and stool PCR negative

## 2023-02-20 NOTE — ASSESSMENT & PLAN NOTE
· Likely prerenal secondary to GI losses and poor p o  intake  · Resolved  · Appreciate Nephrology input  · Patient now with evidence of fluid overload, Lasix ordered  · We will place Zee catheter for 24-48 hours to monitor urine output carefully, patient with evidence of urinary retention and setting of significant constipation  · Follow nephrology recommendations

## 2023-02-20 NOTE — PROGRESS NOTES
[address] [facility]  Progress Note - Madina Putnam 1/11/1933, 80 y o  female MRN: [id number]  Unit/Bed#: Sandra 2 [facility] [place] [id number] Encounter: [id number]  Primary Care Provider: Asia Roberson MD   Date and time admitted to hospital: 2/17/2023 11:09 AM    * Sepsis (HCC)  Assessment & Plan  · Met sepsis criteria with leukocytosis and tachycardia   · Suspected due to colitis   · Markedly elevated procalcitonin, now with significant improvement, continue to monitor  · Blood cultures with no growth to date   · Continue Rocephin and Flagyl  · Lactate within normal limits      EILEEN (acute kidney injury) ([address] )  Assessment & Plan  · Likely prerenal secondary to GI losses and poor p o  intake  · Resolved  · Appreciate Nephrology input  · Patient now with evidence of fluid overload, Lasix ordered  · We will place Zee catheter for 24-48 hours to monitor urine output carefully, patient with evidence of urinary retention and setting of significant constipation  · Follow nephrology recommendations      Hypocalcemia  Assessment & Plan  Likely dilutional component  Monitor CMP  Nephrology following    Lower extremity edema  Assessment & Plan  · Edema present for several months, since initiation of Actos which has been discontinued  · Venous duplex negative for DVT  · BNP elevated  · Chest xray reviewed, small b/l pleural effusions, "subsegmental atelectasis vs  infiltrate right lung base"  · 2D Echo pending  · IV fluids discontinued  · Today received Lasix 20 mg IV x1    Stercoral colitis  Assessment & Plan  · Patient presented with non-bloody diarrhea associated with poor p o  intake  · CT abdomen pelvis showing stool impaction in the rectum with changes of stercoral colitis, stool throughout colon  · Had a BM with soapsuds enema initially, no significant BMs since - will repeat with evidence of stool in rectal vault  · Continue Rocephin and Flagyl  · Bowel regiment with senna, Colace and MiraLAX  · Pain control as needed, avoid opioids  · C  Difficile and stool PCR negative     Hypothyroid  Assessment & Plan  · Uncontrolled, elevated TSH  · Patient's PCP recently increase levothyroxine from 75 to 88 mcg at the end of January- at that time TSH was 18, now 24 - we increased levothyroxine to 100 mcg daily  · Will need to repeat TSH in 3 to 4 weeks      Hypertension  Assessment & Plan  · continue on nifedipine 60 mg daily  · Lisinopril and furosemide were held due to EILEEN  · Now with resolution of EILEEN IV fluids discontinued, patient given IV Lasix 20 mg    Type 2 diabetes mellitus with diabetic neuropathy, without long-term current use of insulin [facility])  Assessment & Plan  Lab Results   Component Value Date    HGBA1C 8 7 (H) 01/26/2023       Recent Labs     02/17/23  1745 02/17/23  2135   POCGLU 131 210*       Blood Sugar Average: Last 72 hrs:  · (P) 170 5   · Patient maintained on glyburide outpatient   · Bad experience with insulin in the past and refuses any at this time        VTE Pharmacologic Prophylaxis: VTE Score: 6 High Risk (Score >/= 5) - Pharmacological DVT Prophylaxis Ordered: heparin  Sequential Compression Devices Ordered  Patient Centered Rounds: I performed bedside rounds with nursing staff today  Discussions with Specialists or Other Care Team Provider: Endocrinology     Education and Discussions with Family / Patient: Updated  (daughter) via phone  Total Time Spent on Date of Encounter in care of patient: 55 minutes This time was spent on one or more of the following: performing physical exam; counseling and coordination of care; obtaining or reviewing history; documenting in the medical record; reviewing/ordering tests, medications or procedures; communicating with other healthcare professionals and discussing with patient's family/caregivers      Current Length of Stay: 3 day(s)  Current Patient Status: Inpatient   Certification Statement: The patient will continue to require additional inpatient hospital stay due to close monitoring   Discharge Plan: Anticipate discharge in 48-72 hrs to rehab facility  Code Status: Level 3 - DNAR and DNI    Subjective:   Patient seen and examined  No significant bowel movement still  Overall reports feeling well  Agreeable to be discharged to rehab facility  Objective:     Vitals:   Temp (24hrs), Av 7 °F (36 5 °C), Min:97 6 °F (36 4 °C), Max:97 8 °F (36 6 °C)    Temp:  [97 6 °F (36 4 °C)-97 8 °F (36 6 °C)] 97 6 °F (36 4 °C)  HR:  [] 100  Resp:  [16-18] 18  BP: (146-171)/(77-93) 171/93  SpO2:  [90 %-100 %] 100 %  Body mass index is 31 01 kg/m²  Input and Output Summary (last 24 hours): Intake/Output Summary (Last 24 hours) at 2023 1558  Last data filed at 2023 1230  Gross per 24 hour   Intake 2331 67 ml   Output 1500 ml   Net 831 67 ml       Physical Exam:   Physical Exam  Constitutional:       General: She is not in acute distress  HENT:      Head: Normocephalic and atraumatic  Nose: No congestion  Mouth/Throat:      Pharynx: Oropharynx is clear  Eyes:      Conjunctiva/sclera: Conjunctivae normal    Cardiovascular:      Rate and Rhythm: Normal rate and regular rhythm  Heart sounds: No murmur heard  Pulmonary:      Effort: No respiratory distress  Breath sounds: No wheezing  Abdominal:      General: There is no distension  Tenderness: There is no abdominal tenderness  There is no guarding  Musculoskeletal:      Right lower leg: Edema present  Left lower leg: Edema present  Skin:     General: Skin is warm and dry  Neurological:      Mental Status: She is oriented to person, place, and time     Psychiatric:         Mood and Affect: Mood normal           Additional Data:     Labs:  Results from last 7 days   Lab Units 23  0554   WBC Thousand/uL 6 30   HEMOGLOBIN g/dL 9 3*   HEMATOCRIT % 30 9*   PLATELETS Thousands/uL 183   NEUTROS PCT % 73   LYMPHS PCT % 18   MONOS PCT % 7   EOS PCT % 1     Results from last 7 days   Lab Units 02/20/23  0554   SODIUM mmol/L 141   POTASSIUM mmol/L 3 6   CHLORIDE mmol/L 120*   CO2 mmol/L 16*   BUN mg/dL 40*   CREATININE mg/dL 1 32*   ANION GAP mmol/L 5   CALCIUM mg/dL 5 9*   ALBUMIN g/dL 2 3*   TOTAL BILIRUBIN mg/dL 0 25   ALK PHOS U/L 42   ALT U/L 8   AST U/L 7*   GLUCOSE RANDOM mg/dL 94     Results from last 7 days   Lab Units 02/17/23  1444   INR  1 34*     Results from last 7 days   Lab Units 02/17/23  2135 02/17/23  1745   POC GLUCOSE mg/dl 210* 131         Results from last 7 days   Lab Units 02/20/23  0554 02/19/23  0804 02/19/23  0601 02/18/23  0513 02/17/23  1444   LACTIC ACID mmol/L  --   --   --   --  1 2   PROCALCITONIN ng/ml 14 61* 38 66* 34 04* 77 33* 64 18*       Lines/Drains:  Invasive Devices     Peripheral Intravenous Line  Duration           Peripheral IV 02/17/23 Left Antecubital 3 days    Peripheral IV 02/18/23 Right;Ventral (anterior) Forearm 1 day          Drain  Duration           Urethral Catheter Non-latex 16 Fr  <1 day              Urinary Catheter:  Goal for removal: Voiding trial when ambulation improves               Imaging: Reviewed radiology reports from this admission including: ultrasound(s) and Personally reviewed the following imaging: ultrasound(s) venous duplex     Recent Cultures (last 7 days):   Results from last 7 days   Lab Units 02/19/23  1147 02/17/23  1451 02/17/23  1444   BLOOD CULTURE   --  No Growth at 48 hrs  No Growth at 48 hrs     C DIFF TOXIN B BY PCR  Negative  --   --        Last 24 Hours Medication List:   Current Facility-Administered Medications   Medication Dose Route Frequency Provider Last Rate   • acetaminophen  650 mg Oral Q6H PRN Uvaldo Gandhi PA-C     • cefTRIAXone  1,000 mg Intravenous Q24H Uvaldo Gandhi PA-C 1,000 mg (02/19/23 1429)   • diazepam  2 mg Oral Q8H PRN Uvaldo Gandhi PA-C     • docusate sodium  100 mg Oral BID Uvaldo Gandhi PA-C     • heparin (porcine)  5,000 Units Subcutaneous Q8H [address] PA-C     • levothyroxine  100 mcg Oral Early Morning Clarice Castaneda MD     • metroNIDAZOLE  500 mg Intravenous Q8H Natividad Boudreaux PA-C 500 mg (02/20/23 1058)   • ondansetron  4 mg Intravenous Q6H PRN Natividad Boudreaux PA-C     • polyethylene glycol  17 g Oral Daily Natividad Boudreaux PA-C     • pravastatin  40 mg Oral Daily With Dinner Estefany Chadwick PA-C     • senna  1 tablet Oral Daily Natividad Boudreaux PA-C          Today, Patient Was Seen By: Faby Montoya MD    **Please Note: This note may have been constructed using a voice recognition system  **

## 2023-02-20 NOTE — PLAN OF CARE
Problem: Potential for Falls  Goal: Patient will remain free of falls  Description: INTERVENTIONS:  - Educate patient/family on patient safety including physical limitations  - Instruct patient to call for assistance with activity   - Consult OT/PT to assist with strengthening/mobility   - Keep Call bell within reach  - Keep bed low and locked with side rails adjusted as appropriate  - Keep care items and personal belongings within reach  - Initiate and maintain comfort rounds  - Make Fall Risk Sign visible to staff  - Offer Toileting every 2 Hours, in advance of need  - Initiate/Maintain alarm  - Obtain necessary fall risk management equipment  - Apply yellow socks and bracelet for high fall risk patients  - Consider moving patient to room near nurses station  Outcome: Progressing     Problem: MOBILITY - ADULT  Goal: Maintain or return to baseline ADL function  Description: INTERVENTIONS:  -  Assess patient's ability to carry out ADLs; assess patient's baseline for ADL function and identify physical deficits which impact ability to perform ADLs (bathing, care of mouth/teeth, toileting, grooming, dressing, etc )  - Assess/evaluate cause of self-care deficits   - Assess range of motion  - Assess patient's mobility; develop plan if impaired  - Assess patient's need for assistive devices and provide as appropriate  - Encourage maximum independence but intervene and supervise when necessary  - Involve family in performance of ADLs  - Assess for home care needs following discharge   - Consider OT consult to assist with ADL evaluation and planning for discharge  - Provide patient education as appropriate  Outcome: Progressing  Goal: Maintains/Returns to pre admission functional level  Description: INTERVENTIONS:  - Perform BMAT or MOVE assessment daily    - Set and communicate daily mobility goal to care team and patient/family/caregiver     - Collaborate with rehabilitation services on mobility goals if consulted  - Perform Range of Motion 3 times a day  - Reposition patient every 2 hours  - Dangle patient 3 times a day  - Stand patient 3 times a day  - Ambulate patient 3 times a day  - Out of bed to chair 3 times a day   - Out of bed for meals 3 times a day  - Out of bed for toileting  - Record patient progress and toleration of activity level   Outcome: Progressing     Problem: GASTROINTESTINAL - ADULT  Goal: Maintains or returns to baseline bowel function  Description: INTERVENTIONS:  - Assess bowel function  - Encourage oral fluids to ensure adequate hydration  - Administer IV fluids if ordered to ensure adequate hydration  - Administer ordered medications as needed  - Encourage mobilization and activity  - Consider nutritional services referral to assist patient with adequate nutrition and appropriate food choices  Outcome: Progressing     Problem: Knowledge Deficit  Goal: Patient/family/caregiver demonstrates understanding of disease process, treatment plan, medications, and discharge instructions  Description: Complete learning assessment and assess knowledge base    Interventions:  - Provide teaching at level of understanding  - Provide teaching via preferred learning methods  Outcome: Progressing     Problem: Prexisting or High Potential for Compromised Skin Integrity  Goal: Skin integrity is maintained or improved  Description: INTERVENTIONS:  - Identify patients at risk for skin breakdown  - Assess and monitor skin integrity  - Assess and monitor nutrition and hydration status  - Monitor labs   - Assess for incontinence   - Turn and reposition patient  - Assist with mobility/ambulation  - Relieve pressure over bony prominences  - Avoid friction and shearing  - Provide appropriate hygiene as needed including keeping skin clean and dry  - Evaluate need for skin moisturizer/barrier cream  - Collaborate with interdisciplinary team   - Patient/family teaching  - Consider wound care consult   Outcome: Progressing Problem: INFECTION - ADULT  Goal: Absence or prevention of progression during hospitalization  Description: INTERVENTIONS:  - Assess and monitor for signs and symptoms of infection  - Monitor lab/diagnostic results  - Monitor all insertion sites, i e  indwelling lines, tubes, and drains  - Monitor endotracheal if appropriate and nasal secretions for changes in amount and color  - [place] appropriate cooling/warming therapies per order  - Administer medications as ordered  - Instruct and encourage patient and family to use good hand hygiene technique  - Identify and instruct in appropriate isolation precautions for identified infection/condition  Outcome: Progressing     Problem: SAFETY ADULT  Goal: Patient will remain free of falls  Description: INTERVENTIONS:  - Educate patient/family on patient safety including physical limitations  - Instruct patient to call for assistance with activity   - Consult OT/PT to assist with strengthening/mobility   - Keep Call bell within reach  - Keep bed low and locked with side rails adjusted as appropriate  - Keep care items and personal belongings within reach  - Initiate and maintain comfort rounds  - Make Fall Risk Sign visible to staff  - Offer Toileting every 2 Hours, in advance of need  - Initiate/Maintain alarm  - Obtain necessary fall risk management equipment  - Apply yellow socks and bracelet for high fall risk patients  - Consider moving patient to room near nurses station  Outcome: Progressing  Goal: Maintain or return to baseline ADL function  Description: INTERVENTIONS:  -  Assess patient's ability to carry out ADLs; assess patient's baseline for ADL function and identify physical deficits which impact ability to perform ADLs (bathing, care of mouth/teeth, toileting, grooming, dressing, etc )  - Assess/evaluate cause of self-care deficits   - Assess range of motion  - Assess patient's mobility; develop plan if impaired  - Assess patient's need for assistive devices and provide as appropriate  - Encourage maximum independence but intervene and supervise when necessary  - Involve family in performance of ADLs  - Assess for home care needs following discharge   - Consider OT consult to assist with ADL evaluation and planning for discharge  - Provide patient education as appropriate  Outcome: Progressing  Goal: Maintains/Returns to pre admission functional level  Description: INTERVENTIONS:  - Perform BMAT or MOVE assessment daily    - Set and communicate daily mobility goal to care team and patient/family/caregiver  - Collaborate with rehabilitation services on mobility goals if consulted  - Perform Range of Motion 3 times a day  - Reposition patient every 2 hours    - Dangle patient 3 times a day  - Stand patient 3 times a day  - Ambulate patient 3 times a day  - Out of bed to chair 3 times a day   - Out of bed for meals 3 times a day  - Out of bed for toileting  - Record patient progress and toleration of activity level   Outcome: Progressing     Problem: DISCHARGE PLANNING  Goal: Discharge to home or other facility with appropriate resources  Description: INTERVENTIONS:  - Identify barriers to discharge w/patient and caregiver  - Arrange for needed discharge resources and transportation as appropriate  - Identify discharge learning needs (meds, wound care, etc )  - Arrange for interpretive services to assist at discharge as needed  - Refer to Case Management Department for coordinating discharge planning if the patient needs post-hospital services based on physician/advanced practitioner order or complex needs related to functional status, cognitive ability, or social support system  Outcome: Progressing     Problem: METABOLIC, FLUID AND ELECTROLYTES - ADULT  Goal: Fluid balance maintained  Description: INTERVENTIONS:  - Monitor labs   - Monitor I/O and WT  - Instruct patient on fluid and nutrition as appropriate  - Assess for signs & symptoms of volume excess or deficit  Outcome: Progressing     Problem: SKIN/TISSUE INTEGRITY - ADULT  Goal: Skin Integrity remains intact(Skin Breakdown Prevention)  Description: Assess:  -Perform [place] assessment   -Clean and moisturize skin   -Inspect skin when repositioning, toileting, and assisting with ADLS  -Assess under medical devices   -Assess extremities for adequate circulation and sensation     Bed Management:  -Have minimal linens on bed & keep smooth, unwrinkled  -Change linens as needed when moist or perspiring  -Avoid sitting or lying in one position for more than 2 hours while in bed  -Keep HOB at 45 degrees     Toileting:  -Offer bedside commode  -Assess for incontinence   -Use incontinent care products after each incontinent episode     Activity:  -Mobilize patient 3 times a day  -Encourage activity and walks on unit  -Encourage or provide ROM exercises   -Turn and reposition patient every 2 Hours  -Use appropriate equipment to lift or move patient in bed  -Instruct/ Assist with weight shifting every 2 when out of bed in chair  -Consider limitation of chair time 2 hour intervals    Skin Care:  -Avoid use of baby powder, tape, friction and shearing, hot water or constrictive clothing  -Relieve pressure over bony prominences   -Do not massage red bony areas    Next Steps:  -Teach patient strategies to minimize risks   -Consider consults to  interdisciplinary teams   Outcome: Progressing

## 2023-02-20 NOTE — PROGRESS NOTES
NEPHROLOGY PROGRESS NOTE   [place] 80 y o  female MRN: [id number]  Unit/Bed#: [place] 2 [id number] Encounter: [id number]        ASSESSMENT & PLAN    61-year-old female with a past medical history of type 2 diabetes mellitus/hypertension, hypothyroidism presented with diarrhea, stool incontinence, sepsis with a right lower lobe pneumonia, complicated by an acute kidney injury    1  Acute kidney injury-present on admission  o Secondary to prerenal azotemia and autoregulatory failure  o Peak creatinine was 2 41  o Creatinine now improved to below baseline  o Discontinue normal saline  o Encourage oral intake  o Blood pressures well controlled  o Urinalysis shows trace protein and small bilirubin with a specific gravity of 1 025    2  CKD G3bA2  o Should have outpatient follow-up with nephrology  o Baseline creatinine around 1 45  o Hold ACE inhibitor, hold diuretic    3  Hypertension  o Blood pressures are currently acceptable  o Procardia on hold, lisinopril on hold, furosemide on hold  o Ongoing treatment of hypothyroidism with a recent increase in levothyroxine    4  Nonanion gap metabolic acidosis  o Hyperchloremic  o Stop IV fluids  o If continues to have diarrhea can place on [place]    5  Anemia of chronic kidney disease  o Current hemoglobin stable         DISCUSSION:    Stop IV fluids  Monitor BP  Monitor diarrhea  Consider checking echocardiogram  Give 20 mg of IV Lasix    SUBJECTIVE:    Patient was seen today  Now having some increased shortness of breath  Urinating well  Still with poor appetite  Pressures are better    12 point review of systems was otherwise negative besides what is mentioned above      Medications:    Current Facility-Administered Medications:   •  acetaminophen (TYLENOL) tablet 650 mg, 650 mg, Oral, Q6H PRN, Ozzie JESUS [place]  •  cefTRIAXone (ROCEPHIN) IVPB (premix in dextrose) 1,000 mg 50 mL, 1,000 mg, Intravenous, Q24H, Estefany Martines PA-C, Last Rate: 100 mL/hr at 02/19/23 1429, 1,000 mg at 02/19/23 1429  •  diazepam (VALIUM) tablet 2 mg, 2 mg, Oral, Q8H PRN, Irene Leung PA-C  •  docusate sodium (COLACE) capsule 100 mg, 100 mg, Oral, BID, Irene Leung PA-C, 100 mg at 02/20/23 0830  •  heparin (porcine) subcutaneous injection 5,000 Units, 5,000 Units, Subcutaneous, Q8H [facility], 5,000 Units at 02/20/23 0536 **AND** Platelet count, , , Once, Irene Leung PA-C  •  levothyroxine tablet 100 mcg, 100 mcg, Oral, Early Morning, Clarice Daniel MD, 100 mcg at 02/20/23 3583  •  magnesium sulfate 4 g/100 mL IVPB (premix) 4 g, 4 g, Intravenous, Once, Scot Rubinstein, MD, Last Rate: 25 mL/hr at 02/20/23 1058, 4 g at 02/20/23 1058  •  metroNIDAZOLE (FLAGYL) IVPB (premix) 500 mg 100 mL, 500 mg, Intravenous, Q8H, Irene Leung PA-C, Last Rate: 200 mL/hr at 02/20/23 1058, 500 mg at 02/20/23 1058  •  ondansetron (ZOFRAN) injection 4 mg, 4 mg, Intravenous, Q6H PRN, Irene Leung PA-C  •  polyethylene glycol (MIRALAX) packet 17 g, 17 g, Oral, Daily, Estefany Vázquez PA-C, 17 g at 02/20/23 0830  •  pravastatin (PRAVACHOL) tablet 40 mg, 40 mg, Oral, Daily With Caleb Reyes PA-C, 40 mg at 02/19/23 1802  •  senna (SENOKOT) tablet 8 6 mg, 1 tablet, Oral, Daily, Irene Leung PA-C, 8 6 mg at 02/20/23 0830    OBJECTIVE:    Vitals:    02/19/23 2000 02/19/23 2130 02/20/23 0557 02/20/23 0733   BP:  146/77  151/79   BP Location:    Right arm   Pulse:    96   Resp:  18  16   Temp:  97 8 °F (36 6 °C)  97 7 °F (36 5 °C)   TempSrc:    Oral   SpO2: 92%   91%   Weight:   65 kg (143 lb 4 8 oz)    Height:            Temp:  [97 5 °F (36 4 °C)-97 8 °F (36 6 °C)] 97 7 °F (36 5 °C)  HR:  [87-96] 96  Resp:  [16-18] 16  BP: (130-151)/(66-79) 151/79  SpO2:  [90 %-92 %] 91 %     Body mass index is 31 01 kg/m²      Weight (last 2 days)     Date/Time Weight    02/20/23 0557 65 (143 3)    02/19/23 0600 64 9 (143 08) 02/18/23 0600 62 3 (137 35)          I/O last 3 completed shifts: In: 3536 7 [P O :780; I V :2556 7; IV Piggyback:200]  Out: 1000 [Urine:1000]    I/O this shift: In: 0   Out: 200 [Urine:200]      Physical exam:    General: no acute distress, cooperative  Eyes: conjunctivae pink, anicteric sclerae  ENT: lips and mucous membranes moist, no exudates, normal external ears  Neck: ROM intact, positive JVD  Chest: No respiratory distress, no accessory muscle use  CVS: normal rate, non pericardial friction rub  Abdomen: soft, non-tender, non-distended, normoactive bowel sounds  Extremities: Positive edema  Skin: no rash  Neuro: awake, alert, oriented, grossly intact  Psych:  Pleasant affect    Invasive Devices:      Lab Results:   Results from last 7 days   Lab Units 02/20/23  0554 02/19/23  0804 02/19/23  0601 02/18/23  1726 02/18/23  0513 02/17/23  1451 02/17/23  1444 02/17/23  1117   WBC Thousand/uL 6 30 9 43 6 37  --  14 14*  --   --  23 24*   HEMOGLOBIN g/dL 9 3* 10 1* 6 9*  --  9 8*  --   --  11 3*   HEMATOCRIT % 30 9* 33 4* 22 8*  --  32 3*  --   --  36 1   PLATELETS Thousands/uL 183 175 123*  --  138*  --   --  187   POTASSIUM mmol/L 3 6 4 3 3 7  --  4 6  --   --  4 8   CHLORIDE mmol/L 120* 106 114*  --  107  --   --  102   CO2 mmol/L 16* 22 19*  --  22  --   --  23   BUN mg/dL 40* 59* 51*  --  56*  --   --  50*   CREATININE mg/dL 1 32* 2 15* 1 86*  --  2 41*  --   --  2 39*   CALCIUM mg/dL 5 9* 7 9* 6 3*  --  8 1*  --   --  8 9   MAGNESIUM mg/dL 1 6*  --   --   --  1 7*  --   --   --    ALK PHOS U/L 42 59 46  --  57  --   --  64   ALT U/L 8 12 9  --  12  --   --  15   AST U/L 7* 11* 9*  --  11*  --   --  18   BLOOD CULTURE   --   --   --   --   --  No Growth at 48 hrs  No Growth at 48 hrs  --    LEUKOCYTES UA   --   --   --  Negative  --   --   --   --    BLOOD UA   --   --   --  Negative  --   --   --   --          Portions of the record may have been created with voice recognition software   Occasional wrong word or "sound a like" substitutions may have occurred due to the inherent limitations of voice recognition software  Read the chart carefully and recognize, using context, where substitutions have occurred  If you have any questions, please contact the dictating provider

## 2023-02-20 NOTE — ASSESSMENT & PLAN NOTE
· Uncontrolled, elevated TSH  · Patient's PCP recently increase levothyroxine from 75 to 88 mcg at the end of January- at that time TSH was 18, now 24 - we increased levothyroxine to 100 mcg daily  · Will need to repeat TSH in 3 to 4 weeks

## 2023-02-20 NOTE — OCCUPATIONAL THERAPY NOTE
Occupational Therapy Evaluation     Patient Name: Torres Cameron  CLFLS'K Date: 2/20/2023  Problem List  Principal Problem:    Sepsis [facility])  Active Problems:    Type 2 diabetes mellitus with diabetic neuropathy, without long-term current use of insulin ([facility] )    Hypertension    Hypothyroid    Stercoral colitis    Pneumonia    EILEEN (acute kidney injury) ([facility] )    Lower extremity edema    Past Medical History  Past Medical History:   Diagnosis Date    Abnormal mammogram 2008    Anemia 12/22/2020    Anxiety 5/15/2014    Basal cell carcinoma (BCC) of neck 6/23/2021    Carcinoid tumor     Diabetes mellitus ([facility] )     Disease of thyroid gland     High cholesterol     Hyperlipidemia     Hypertension     Osteoarthritis 7/24/2008    Pneumonia 2/17/2023    Sciatica 2008    Stage 3a chronic kidney disease ([facility] ) 1/12/2022     Past Surgical History  Past Surgical History:   Procedure Laterality Date    APPENDECTOMY      incidental finding at appendectomy    Jaxon Paul W/ BILATERAL SALPINGOOPHORECTOMY      benign    TUMOR REMOVAL      carcinoid tumor surgery         02/20/23 1025   OT Last Visit   OT Visit Date 02/20/23   Note Type   Note type Evaluation   Additional Comments Pt presents supine in bed, agreeable to OT eval    Pain Assessment   Pain Assessment Tool 0-10   Pain Score No Pain   Restrictions/Precautions   Weight Bearing Precautions Per Order No   Other Precautions Contact/isolation; Chair Alarm; Bed Alarm;Multiple lines; Fall Risk   Home Living   Type of [address] [place] [address] One level;Performs ADLs on one level; Able to live on main level with bedroom/bathroom; Ramped entrance   Bathroom Shower/Tub Tub/shower unit   [employer] Raised   Bathroom Equipment Grab bars in shower;Built-in shower seat   Home Equipment Walker;Cane   Prior Function   Level of [place] Independent with ADLs; Independent with functional mobility; Independent with IADLS   Lives With Daughter  (and SUSAN)   Receives Help From Family   IADLs Independent with meal prep; Independent with medication management; Family/Friend/Other provides transportation   Falls in the last 6 months 1 to 4   Vocational Retired   Lifestyle   Autonomy Pt lives with her daughter and SUSAN in a ranch styled home  Reports [place] with ADLs, some (A) with IADLs  (-)   + falls  +Home alone, but pt usually sedentary when family is not home   Reciprocal Relationships Daughter   Intrinsic Gratification Watching TV   Subjective   Subjective "I'm tired"   ADL   Where Assessed Edge of bed   Eating Assistance 5  Supervision/Setup   Grooming Assistance 5  Supervision/Setup   UB Bathing Assistance 4  Minimal Assistance   LB Bathing Assistance 3  Moderate Assistance   [address] 4  Minimal Assistance   LB Dressing Assistance 2  Maximal [address]  2  Maximal Assistance   Bed Mobility   Supine to Sit 3  Moderate assistance   Additional items Assist x 1;Bedrails; Increased time required;Verbal cues;LE management   Sit to Supine 2  Maximal assistance   Additional items Assist x 1;Bedrails; Increased time required;Verbal cues;LE management   Additional Comments VCs for safe transfer technique   Transfers   Sit to Stand 3  Moderate assistance   Additional items Assist x 1; Increased time required;Verbal cues   Stand to Sit 4  Minimal assistance   Additional items Assist x 1; Increased time required;Verbal cues   Toilet transfer 3  Moderate assistance   Additional items Assist x 1; Increased time required;Verbal cues;Standard toilet; Other;Armrests  (RW)   Functional Mobility   Functional Mobility 4  Minimal assistance   Additional Comments Ax1, RW   Balance   Static Sitting Fair +   Dynamic Sitting Fair -   Static Standing Poor +   Dynamic Standing Poor   Ambulatory Poor   Activity Tolerance   Activity Tolerance Patient limited by fatigue;Treatment limited secondary to medical complications (Comment)   Medical Staff Made Aware CM, RN   Nurse Made Aware Cleared for therapy   RUE Assessment   RUE Assessment WFL  (grossly 4-/5)   LUE Assessment   LUE Assessment WFL  (grossly 4-/5)   Hand Function   Gross Motor Coordination Functional   Sensation   Light Touch No apparent deficits   Proprioception   Proprioception No apparent deficits   Vision-Basic Assessment   Current Vision Wears glasses all the time   Vision - Complex Assessment   Ocular Range of Motion Intact   Acuity Able to read clock/calendar on wall without difficulty; Able to read employee name badge without difficulty   Perception   Inattention/Neglect Appears intact   Cognition   Overall Cognitive Status [facility]   Arousal/Participation Alert; Responsive; Cooperative   Attention Attends with cues to redirect   Orientation Level Oriented X4   Memory Decreased recall of precautions   Following Commands Follows one step commands without difficulty   Assessment   Limitation Decreased ADL status; Decreased UE strength;Decreased Safe judgement during ADL;Decreased endurance;Decreased self-care trans;Decreased high-level ADLs   Prognosis Good   Assessment Patient is a 80y o  year old female seen for OT eval s/p admit to [facility] on 2/17/2023 with sepsis, pneumonia, EILEEN, LE edema, stercoral colitis  Comorbidities affecting pt’s functional performance include a significant PMH of: DM2, HTN, hypothyroid, anxiety, OA, HLD, neuropathy, hx of vertigo, anemia, casal call carcinoma, CKD3  Patient with active OT orders and activity orders for Up with assistance  Personal factors affecting pt at time of IE include: difficulty performing ADLs and IADLs, difficulty with functional mobility/transfers  Prior to admission, patient was (I) with ADLs, requires assistance with IADLs   Upon evaluation, patient’s functional status as follows: eating: SBA, grooming: SBA, UB bathing: MIN A , LB bathing: MOD A, UB dressing: MIN A , LB dressing: MAX A, toileting: MAX A; functional transfers: MOD A, bed mobility: MOD A, functional mobility: MIN A with RW, sitting/standing tolerance: ~1 minute with b/l UE support- due to the following deficits impacting occupational performance: decreased B UE strength, decreased static/dynamic sitting/standing balance, decreased activity tolerance, decreased safety awareness  Patient would benefit from continued skilled OT therapy while in acute setting to address deficits as defined above and maximize (I) with ADLs and functional mobility  Occupational performance areas to address include: grooming, bathing, toileting, UB/LB dressing, functional mobility, household maintenance, and medication management  Based on the aforementioned OT evaluation, functional performance deficits, and assessments, pt has been identified as a high complexity evaluation      The patient's raw score on the AM-PAC Daily Activity Inpatient Short Form is 16  A raw score of less than 19 suggests the patient may benefit from discharge to post-acute rehabilitation services  Please refer to the recommendation of the Occupational Therapist for safe discharge planning  Goals   Patient Goals to get stronger   LTG Time Frame 10-14   Plan   Treatment Interventions ADL retraining;Functional transfer training;UE strengthening/ROM; Endurance training;Patient/family training;Equipment evaluation/education; Compensatory technique education;Continued evaluation; Energy conservation; Activityengagement   Goal Expiration Date 03/06/23   OT Treatment Day 0   OT Frequency 3-5x/wk   Recommendation   OT Discharge Recommendation Post acute rehabilitation services   AM-[facility] Daily Activity Inpatient   Lower Body Dressing 2   Bathing 2   Toileting 2   Upper Body Dressing 3   Grooming 3   Eating 4   Daily Activity Raw Score 16   Daily Activity Standardized Score (Calc for Raw Score >=11) 35 96   AM-[facility] Applied Cognition Inpatient   Following a Speech/Presentation 4   Understanding Ordinary Conversation 4   Taking Medications 3 Biopsy Photograph Reviewed: Yes Remembering Where Things Are Placed or Put Away 3   Remembering List of 4-5 Errands 3   Taking Care of Complicated Tasks 3   Applied Cognition Raw Score 20   Applied Cognition Standardized Score 41 76     Occupational Therapy goals: In 7-14 days:     1- Patient will verbalize and demonstrate use of energy conservation/deep breathing technique and work simplification skills during functional activity with no verbal cues  2- Patient will verbalize and demonstrate good body mechanics and joint protection techniques during ADLs/IADLs with no verbal cues     3- Pt will complete bed mobility at a Mod I level w/ G balance/safety demonstrated to decrease caregiver assistance required     4- Patient will increase OOB/ sitting tolerance to 2-4 hours per day for increased participation in self care and leisure tasks with no s/s of exertion  5-Patient will increase standing tolerance time to 5 minutes with unilateral UE support to complete sink level ADLs@ mod I level      5- Patient will increase sitting tolerance at edge of bed to 20 minutes to complete UB ADLs @ set up assist level       6- Pt will improve functional transfers to Mod I on/off all surfaces using DME as needed w/ G balance/safety     7- Patient will complete UB ADLs with [place] utilizing appropriate DME/AE PRN     8- Patient will complete LB ADLs with [place] utilizing appropriate DME/AE PRN     9- Patient will complete toileting hygiene with [place] with G hygiene/thoroughness utilizing appropriate DME/AE PRN     10- Pt will improve functional mobility during ADL/IADL/leisure tasks to Mod I using DME as needed w/ G balance/safety      11- Pt will be attentive 100% of the time during ongoing cognitive assessment w/ G participation to assist w/ safe d/c planning/recommendations     12- Pt will participate in simulated IADL management task to increase independence to Mod I w/ G safety and endurance     13- Pt will increase BUE strength by 1MM grade via AROM/AAROM/PROM exercises to increase independence in ADLs and transfers       [facility] Size Of Lesion In Cm: 1.2 X Size Of Lesion In Cm (Optional): 0 Size Of Margin In Cm: 0.4 Excision Method: Saucerization Anesthesia Volume In Cc: 6 Did You Provide Opioid Counseling: No Repair Type: None Suturegard Retention Suture: 2-0 Nylon Retention Suture Bite Size: 3 mm Length To Time In Minutes Device Was In Place: 10 Number Of Hemigard Strips Per Side: 1 Undermining Type: Entire Wound Debridement Text: The wound edges were debrided prior to proceeding with the closure to facilitate wound healing. Helical Rim Text: The closure involved the helical rim. Vermilion Border Text: The closure involved the vermilion border. Nostril Rim Text: The closure involved the nostril rim. Retention Suture Text: Retention sutures were placed to support the closure and prevent dehiscence. Lab Facility: 3 Graft Donor Site Bandage (Optional-Leave Blank If You Don't Want In Note): Steri-strips and a pressure bandage were applied to the donor site. Epidermal Closure Graft Donor Site (Optional): simple interrupted Billing Type: Third-Party Bill Excision Depth: adipose tissue Scalpel Size: 15 blade Anesthesia Type: 1% lidocaine with epinephrine Hemostasis: Electrocautery Estimated Blood Loss (Cc): minimal Detail Level: Detailed Deep Sutures: 3-0 Polysorb Epidermal Sutures: 4-0 Monosof Epidermal Closure: running Wound Care: Polysporin ointment Dressing: pressure dressing Suturegard Intro: Intraoperative tissue expansion was performed, utilizing the SUTUREGARD device, in order to reduce wound tension. Suturegard Body: The suture ends were repeatedly re-tightened and re-clamped to achieve the desired tissue expansion. Hemigard Intro: Due to skin fragility and wound tension, it was decided to use HEMIGARD adhesive retention suture devices to permit a linear closure. The skin was cleaned and dried for a 6cm distance away from the wound. Excessive hair, if present, was removed to allow for adhesion. Hemigard Postcare Instructions: The HEMIGARD strips are to remain completely dry for at least 5-7 days. Positioning (Leave Blank If You Do Not Want): The patient was placed in a comfortable position exposing the surgical site. Pre-Excision Curettage Text (Leave Blank If You Do Not Want): Prior to drawing the surgical margin the visible lesion was removed with electrodesiccation and curettage to clearly define the lesion size. Complex Repair Preamble Text (Leave Blank If You Do Not Want): Extensive wide undermining was performed. Intermediate Repair Preamble Text (Leave Blank If You Do Not Want): Undermining was performed with blunt dissection. Curvilinear Excision Additional Text (Leave Blank If You Do Not Want): The margin was drawn around the clinically apparent lesion.  A curvilinear shape was then drawn on the skin incorporating the lesion and margins.  Incisions were then made along these lines to the appropriate tissue plane and the lesion was extirpated. Fusiform Excision Additional Text (Leave Blank If You Do Not Want): The margin was drawn around the clinically apparent lesion.  A fusiform shape was then drawn on the skin incorporating the lesion and margins.  Incisions were then made along these lines to the appropriate tissue plane and the lesion was extirpated. Elliptical Excision Additional Text (Leave Blank If You Do Not Want): The margin was drawn around the clinically apparent lesion.  An elliptical shape was then drawn on the skin incorporating the lesion and margins.  Incisions were then made along these lines to the appropriate tissue plane and the lesion was extirpated. Saucerization Excision Additional Text (Leave Blank If You Do Not Want): The margin was drawn around the clinically apparent lesion.  Incisions were then made along these lines, in a tangential fashion, to the appropriate tissue plane and the lesion was extirpated. Slit Excision Additional Text (Leave Blank If You Do Not Want): A linear line was drawn on the skin overlying the lesion. An incision was made slowly until the lesion was visualized.  Once visualized, the lesion was removed with blunt dissection. Excisional Biopsy Additional Text (Leave Blank If You Do Not Want): The margin was drawn around the clinically apparent lesion. An elliptical shape was then drawn on the skin incorporating the lesion and margins.  Incisions were then made along these lines to the appropriate tissue plane and the lesion was extirpated. Perilesional Excision Additional Text (Leave Blank If You Do Not Want): The margin was drawn around the clinically apparent lesion. Incisions were then made along these lines to the appropriate tissue plane and the lesion was extirpated. Repair Performed By Another Provider Text (Leave Blank If You Do Not Want): After the tissue was excised the defect was repaired by another provider. No Repair - Repaired With Adjacent Surgical Defect Text (Leave Blank If You Do Not Want): After the excision the defect was repaired concurrently with another surgical defect which was in close approximation. Advancement Flap (Single) Text: The defect edges were debeveled with a #15 scalpel blade.  Given the location of the defect and the proximity to free margins a single advancement flap was deemed most appropriate.  Using a sterile surgical marker, an appropriate advancement flap was drawn incorporating the defect and placing the expected incisions within the relaxed skin tension lines where possible.    The area thus outlined was incised deep to adipose tissue with a #15 scalpel blade.  The skin margins were undermined to an appropriate distance in all directions utilizing iris scissors. Advancement Flap (Double) Text: The defect edges were debeveled with a #15 scalpel blade.  Given the location of the defect and the proximity to free margins a double advancement flap was deemed most appropriate.  Using a sterile surgical marker, the appropriate advancement flaps were drawn incorporating the defect and placing the expected incisions within the relaxed skin tension lines where possible.    The area thus outlined was incised deep to adipose tissue with a #15 scalpel blade.  The skin margins were undermined to an appropriate distance in all directions utilizing iris scissors. Burow's Advancement Flap Text: The defect edges were debeveled with a #15 scalpel blade.  Given the location of the defect and the proximity to free margins a Burow's advancement flap was deemed most appropriate.  Using a sterile surgical marker, the appropriate advancement flap was drawn incorporating the defect and placing the expected incisions within the relaxed skin tension lines where possible.    The area thus outlined was incised deep to adipose tissue with a #15 scalpel blade.  The skin margins were undermined to an appropriate distance in all directions utilizing iris scissors. Chonodrocutaneous Helical Advancement Flap Text: The defect edges were debeveled with a #15 scalpel blade.  Given the location of the defect and the proximity to free margins a chondrocutaneous helical advancement flap was deemed most appropriate.  Using a sterile surgical marker, the appropriate advancement flap was drawn incorporating the defect and placing the expected incisions within the relaxed skin tension lines where possible.    The area thus outlined was incised deep to adipose tissue with a #15 scalpel blade.  The skin margins were undermined to an appropriate distance in all directions utilizing iris scissors. Crescentic Advancement Flap Text: The defect edges were debeveled with a #15 scalpel blade.  Given the location of the defect and the proximity to free margins a crescentic advancement flap was deemed most appropriate.  Using a sterile surgical marker, the appropriate advancement flap was drawn incorporating the defect and placing the expected incisions within the relaxed skin tension lines where possible.    The area thus outlined was incised deep to adipose tissue with a #15 scalpel blade.  The skin margins were undermined to an appropriate distance in all directions utilizing iris scissors. A-T Advancement Flap Text: The defect edges were debeveled with a #15 scalpel blade.  Given the location of the defect, shape of the defect and the proximity to free margins an A-T advancement flap was deemed most appropriate.  Using a sterile surgical marker, an appropriate advancement flap was drawn incorporating the defect and placing the expected incisions within the relaxed skin tension lines where possible.    The area thus outlined was incised deep to adipose tissue with a #15 scalpel blade.  The skin margins were undermined to an appropriate distance in all directions utilizing iris scissors. O-T Advancement Flap Text: The defect edges were debeveled with a #15 scalpel blade.  Given the location of the defect, shape of the defect and the proximity to free margins an O-T advancement flap was deemed most appropriate.  Using a sterile surgical marker, an appropriate advancement flap was drawn incorporating the defect and placing the expected incisions within the relaxed skin tension lines where possible.    The area thus outlined was incised deep to adipose tissue with a #15 scalpel blade.  The skin margins were undermined to an appropriate distance in all directions utilizing iris scissors. O-L Flap Text: The defect edges were debeveled with a #15 scalpel blade.  Given the location of the defect, shape of the defect and the proximity to free margins an O-L flap was deemed most appropriate.  Using a sterile surgical marker, an appropriate advancement flap was drawn incorporating the defect and placing the expected incisions within the relaxed skin tension lines where possible.    The area thus outlined was incised deep to adipose tissue with a #15 scalpel blade.  The skin margins were undermined to an appropriate distance in all directions utilizing iris scissors. O-Z Flap Text: The defect edges were debeveled with a #15 scalpel blade.  Given the location of the defect, shape of the defect and the proximity to free margins an O-Z flap was deemed most appropriate.  Using a sterile surgical marker, an appropriate transposition flap was drawn incorporating the defect and placing the expected incisions within the relaxed skin tension lines where possible. The area thus outlined was incised deep to adipose tissue with a #15 scalpel blade.  The skin margins were undermined to an appropriate distance in all directions utilizing iris scissors. Double O-Z Flap Text: The defect edges were debeveled with a #15 scalpel blade.  Given the location of the defect, shape of the defect and the proximity to free margins a Double O-Z flap was deemed most appropriate.  Using a sterile surgical marker, an appropriate transposition flap was drawn incorporating the defect and placing the expected incisions within the relaxed skin tension lines where possible. The area thus outlined was incised deep to adipose tissue with a #15 scalpel blade.  The skin margins were undermined to an appropriate distance in all directions utilizing iris scissors. V-Y Flap Text: The defect edges were debeveled with a #15 scalpel blade.  Given the location of the defect, shape of the defect and the proximity to free margins a V-Y flap was deemed most appropriate.  Using a sterile surgical marker, an appropriate advancement flap was drawn incorporating the defect and placing the expected incisions within the relaxed skin tension lines where possible.    The area thus outlined was incised deep to adipose tissue with a #15 scalpel blade.  The skin margins were undermined to an appropriate distance in all directions utilizing iris scissors. Advancement-Rotation Flap Text: The defect edges were debeveled with a #15 scalpel blade.  Given the location of the defect, shape of the defect and the proximity to free margins an advancement-rotation flap was deemed most appropriate.  Using a sterile surgical marker, an appropriate flap was drawn incorporating the defect and placing the expected incisions within the relaxed skin tension lines where possible. The area thus outlined was incised deep to adipose tissue with a #15 scalpel blade.  The skin margins were undermined to an appropriate distance in all directions utilizing iris scissors. Mercedes Flap Text: The defect edges were debeveled with a #15 scalpel blade.  Given the location of the defect, shape of the defect and the proximity to free margins a Mercedes flap was deemed most appropriate.  Using a sterile surgical marker, an appropriate advancement flap was drawn incorporating the defect and placing the expected incisions within the relaxed skin tension lines where possible. The area thus outlined was incised deep to adipose tissue with a #15 scalpel blade.  The skin margins were undermined to an appropriate distance in all directions utilizing iris scissors. Modified Advancement Flap Text: The defect edges were debeveled with a #15 scalpel blade.  Given the location of the defect, shape of the defect and the proximity to free margins a modified advancement flap was deemed most appropriate.  Using a sterile surgical marker, an appropriate advancement flap was drawn incorporating the defect and placing the expected incisions within the relaxed skin tension lines where possible.    The area thus outlined was incised deep to adipose tissue with a #15 scalpel blade.  The skin margins were undermined to an appropriate distance in all directions utilizing iris scissors. Mucosal Advancement Flap Text: Given the location of the defect, shape of the defect and the proximity to free margins a mucosal advancement flap was deemed most appropriate. Incisions were made with a 15 blade scalpel in the appropriate fashion along the cutaneous vermilion border and the mucosal lip. The remaining actinically damaged mucosal tissue was excised.  The mucosal advancement flap was then elevated to the gingival sulcus with care taken to preserve the neurovascular structures and advanced into the primary defect. Care was taken to ensure that precise realignment of the vermilion border was achieved. Peng Advancement Flap Text: The defect edges were debeveled with a #15 scalpel blade.  Given the location of the defect, shape of the defect and the proximity to free margins a Peng advancement flap was deemed most appropriate.  Using a sterile surgical marker, an appropriate advancement flap was drawn incorporating the defect and placing the expected incisions within the relaxed skin tension lines where possible. The area thus outlined was incised deep to adipose tissue with a #15 scalpel blade.  The skin margins were undermined to an appropriate distance in all directions utilizing iris scissors. Hatchet Flap Text: The defect edges were debeveled with a #15 scalpel blade.  Given the location of the defect, shape of the defect and the proximity to free margins a hatchet flap was deemed most appropriate.  Using a sterile surgical marker, an appropriate hatchet flap was drawn incorporating the defect and placing the expected incisions within the relaxed skin tension lines where possible.    The area thus outlined was incised deep to adipose tissue with a #15 scalpel blade.  The skin margins were undermined to an appropriate distance in all directions utilizing iris scissors. Rotation Flap Text: The defect edges were debeveled with a #15 scalpel blade.  Given the location of the defect, shape of the defect and the proximity to free margins a rotation flap was deemed most appropriate.  Using a sterile surgical marker, an appropriate rotation flap was drawn incorporating the defect and placing the expected incisions within the relaxed skin tension lines where possible.    The area thus outlined was incised deep to adipose tissue with a #15 scalpel blade.  The skin margins were undermined to an appropriate distance in all directions utilizing iris scissors. Spiral Flap Text: The defect edges were debeveled with a #15 scalpel blade.  Given the location of the defect, shape of the defect and the proximity to free margins a spiral flap was deemed most appropriate.  Using a sterile surgical marker, an appropriate rotation flap was drawn incorporating the defect and placing the expected incisions within the relaxed skin tension lines where possible. The area thus outlined was incised deep to adipose tissue with a #15 scalpel blade.  The skin margins were undermined to an appropriate distance in all directions utilizing iris scissors. Star Wedge Flap Text: The defect edges were debeveled with a #15 scalpel blade.  Given the location of the defect, shape of the defect and the proximity to free margins a star wedge flap was deemed most appropriate.  Using a sterile surgical marker, an appropriate rotation flap was drawn incorporating the defect and placing the expected incisions within the relaxed skin tension lines where possible. The area thus outlined was incised deep to adipose tissue with a #15 scalpel blade.  The skin margins were undermined to an appropriate distance in all directions utilizing iris scissors. Transposition Flap Text: The defect edges were debeveled with a #15 scalpel blade.  Given the location of the defect and the proximity to free margins a transposition flap was deemed most appropriate.  Using a sterile surgical marker, an appropriate transposition flap was drawn incorporating the defect.    The area thus outlined was incised deep to adipose tissue with a #15 scalpel blade.  The skin margins were undermined to an appropriate distance in all directions utilizing iris scissors. Muscle Hinge Flap Text: The defect edges were debeveled with a #15 scalpel blade.  Given the size, depth and location of the defect and the proximity to free margins a muscle hinge flap was deemed most appropriate.  Using a sterile surgical marker, an appropriate hinge flap was drawn incorporating the defect. The area thus outlined was incised with a #15 scalpel blade.  The skin margins were undermined to an appropriate distance in all directions utilizing iris scissors. Melolabial Transposition Flap Text: The defect edges were debeveled with a #15 scalpel blade.  Given the location of the defect and the proximity to free margins a melolabial flap was deemed most appropriate.  Using a sterile surgical marker, an appropriate melolabial transposition flap was drawn incorporating the defect.    The area thus outlined was incised deep to adipose tissue with a #15 scalpel blade.  The skin margins were undermined to an appropriate distance in all directions utilizing iris scissors. Rhombic Flap Text: The defect edges were debeveled with a #15 scalpel blade.  Given the location of the defect and the proximity to free margins a rhombic flap was deemed most appropriate.  Using a sterile surgical marker, an appropriate rhombic flap was drawn incorporating the defect.    The area thus outlined was incised deep to adipose tissue with a #15 scalpel blade.  The skin margins were undermined to an appropriate distance in all directions utilizing iris scissors. Rhomboid Transposition Flap Text: The defect edges were debeveled with a #15 scalpel blade.  Given the location of the defect and the proximity to free margins a rhomboid transposition flap was deemed most appropriate.  Using a sterile surgical marker, an appropriate rhomboid flap was drawn incorporating the defect.    The area thus outlined was incised deep to adipose tissue with a #15 scalpel blade.  The skin margins were undermined to an appropriate distance in all directions utilizing iris scissors. Bi-Rhombic Flap Text: The defect edges were debeveled with a #15 scalpel blade.  Given the location of the defect and the proximity to free margins a bi-rhombic flap was deemed most appropriate.  Using a sterile surgical marker, an appropriate rhombic flap was drawn incorporating the defect. The area thus outlined was incised deep to adipose tissue with a #15 scalpel blade.  The skin margins were undermined to an appropriate distance in all directions utilizing iris scissors. Helical Rim Advancement Flap Text: The defect edges were debeveled with a #15 blade scalpel.  Given the location of the defect and the proximity to free margins (helical rim) a double helical rim advancement flap was deemed most appropriate.  Using a sterile surgical marker, the appropriate advancement flaps were drawn incorporating the defect and placing the expected incisions between the helical rim and antihelix where possible.  The area thus outlined was incised through and through with a #15 scalpel blade.  With a skin hook and iris scissors, the flaps were gently and sharply undermined and freed up. Bilateral Helical Rim Advancement Flap Text: The defect edges were debeveled with a #15 blade scalpel.  Given the location of the defect and the proximity to free margins (helical rim) a bilateral helical rim advancement flap was deemed most appropriate.  Using a sterile surgical marker, the appropriate advancement flaps were drawn incorporating the defect and placing the expected incisions between the helical rim and antihelix where possible.  The area thus outlined was incised through and through with a #15 scalpel blade.  With a skin hook and iris scissors, the flaps were gently and sharply undermined and freed up. Ear Star Wedge Flap Text: The defect edges were debeveled with a #15 blade scalpel.  Given the location of the defect and the proximity to free margins (helical rim) an ear star wedge flap was deemed most appropriate.  Using a sterile surgical marker, the appropriate flap was drawn incorporating the defect and placing the expected incisions between the helical rim and antihelix where possible.  The area thus outlined was incised through and through with a #15 scalpel blade. Banner Transposition Flap Text: The defect edges were debeveled with a #15 scalpel blade.  Given the location of the defect and the proximity to free margins a Banner transposition flap was deemed most appropriate.  Using a sterile surgical marker, an appropriate flap drawn around the defect. The area thus outlined was incised deep to adipose tissue with a #15 scalpel blade.  The skin margins were undermined to an appropriate distance in all directions utilizing iris scissors. Bilobed Flap Text: The defect edges were debeveled with a #15 scalpel blade.  Given the location of the defect and the proximity to free margins a bilobe flap was deemed most appropriate.  Using a sterile surgical marker, an appropriate bilobe flap drawn around the defect.    The area thus outlined was incised deep to adipose tissue with a #15 scalpel blade.  The skin margins were undermined to an appropriate distance in all directions utilizing iris scissors. Bilobed Transposition Flap Text: The defect edges were debeveled with a #15 scalpel blade.  Given the location of the defect and the proximity to free margins a bilobed transposition flap was deemed most appropriate.  Using a sterile surgical marker, an appropriate bilobe flap drawn around the defect.    The area thus outlined was incised deep to adipose tissue with a #15 scalpel blade.  The skin margins were undermined to an appropriate distance in all directions utilizing iris scissors. Trilobed Flap Text: The defect edges were debeveled with a #15 scalpel blade.  Given the location of the defect and the proximity to free margins a trilobed flap was deemed most appropriate.  Using a sterile surgical marker, an appropriate trilobed flap drawn around the defect.    The area thus outlined was incised deep to adipose tissue with a #15 scalpel blade.  The skin margins were undermined to an appropriate distance in all directions utilizing iris scissors. Dorsal Nasal Flap Text: The defect edges were debeveled with a #15 scalpel blade.  Given the location of the defect and the proximity to free margins a dorsal nasal flap was deemed most appropriate.  Using a sterile surgical marker, an appropriate dorsal nasal flap was drawn around the defect.    The area thus outlined was incised deep to adipose tissue with a #15 scalpel blade.  The skin margins were undermined to an appropriate distance in all directions utilizing iris scissors. Island Pedicle Flap Text: The defect edges were debeveled with a #15 scalpel blade.  Given the location of the defect, shape of the defect and the proximity to free margins an island pedicle advancement flap was deemed most appropriate.  Using a sterile surgical marker, an appropriate advancement flap was drawn incorporating the defect, outlining the appropriate donor tissue and placing the expected incisions within the relaxed skin tension lines where possible.    The area thus outlined was incised deep to adipose tissue with a #15 scalpel blade.  The skin margins were undermined to an appropriate distance in all directions around the primary defect and laterally outward around the island pedicle utilizing iris scissors.  There was minimal undermining beneath the pedicle flap. Island Pedicle Flap With Canthal Suspension Text: The defect edges were debeveled with a #15 scalpel blade.  Given the location of the defect, shape of the defect and the proximity to free margins an island pedicle advancement flap was deemed most appropriate.  Using a sterile surgical marker, an appropriate advancement flap was drawn incorporating the defect, outlining the appropriate donor tissue and placing the expected incisions within the relaxed skin tension lines where possible. The area thus outlined was incised deep to adipose tissue with a #15 scalpel blade.  The skin margins were undermined to an appropriate distance in all directions around the primary defect and laterally outward around the island pedicle utilizing iris scissors.  There was minimal undermining beneath the pedicle flap. A suspension suture was placed in the canthal tendon to prevent tension and prevent ectropion. Alar Island Pedicle Flap Text: The defect edges were debeveled with a #15 scalpel blade.  Given the location of the defect, shape of the defect and the proximity to the alar rim an island pedicle advancement flap was deemed most appropriate.  Using a sterile surgical marker, an appropriate advancement flap was drawn incorporating the defect, outlining the appropriate donor tissue and placing the expected incisions within the nasal ala running parallel to the alar rim. The area thus outlined was incised with a #15 scalpel blade.  The skin margins were undermined minimally to an appropriate distance in all directions around the primary defect and laterally outward around the island pedicle utilizing iris scissors.  There was minimal undermining beneath the pedicle flap. Double Island Pedicle Flap Text: The defect edges were debeveled with a #15 scalpel blade.  Given the location of the defect, shape of the defect and the proximity to free margins a double island pedicle advancement flap was deemed most appropriate.  Using a sterile surgical marker, an appropriate advancement flap was drawn incorporating the defect, outlining the appropriate donor tissue and placing the expected incisions within the relaxed skin tension lines where possible.    The area thus outlined was incised deep to adipose tissue with a #15 scalpel blade.  The skin margins were undermined to an appropriate distance in all directions around the primary defect and laterally outward around the island pedicle utilizing iris scissors.  There was minimal undermining beneath the pedicle flap. Island Pedicle Flap-Requiring Vessel Identification Text: The defect edges were debeveled with a #15 scalpel blade.  Given the location of the defect, shape of the defect and the proximity to free margins an island pedicle advancement flap was deemed most appropriate.  Using a sterile surgical marker, an appropriate advancement flap was drawn, based on the axial vessel mentioned above, incorporating the defect, outlining the appropriate donor tissue and placing the expected incisions within the relaxed skin tension lines where possible.    The area thus outlined was incised deep to adipose tissue with a #15 scalpel blade.  The skin margins were undermined to an appropriate distance in all directions around the primary defect and laterally outward around the island pedicle utilizing iris scissors.  There was minimal undermining beneath the pedicle flap. Keystone Flap Text: The defect edges were debeveled with a #15 scalpel blade.  Given the location of the defect, shape of the defect a keystone flap was deemed most appropriate.  Using a sterile surgical marker, an appropriate keystone flap was drawn incorporating the defect, outlining the appropriate donor tissue and placing the expected incisions within the relaxed skin tension lines where possible. The area thus outlined was incised deep to adipose tissue with a #15 scalpel blade.  The skin margins were undermined to an appropriate distance in all directions around the primary defect and laterally outward around the flap utilizing iris scissors. O-T Plasty Text: The defect edges were debeveled with a #15 scalpel blade.  Given the location of the defect, shape of the defect and the proximity to free margins an O-T plasty was deemed most appropriate.  Using a sterile surgical marker, an appropriate O-T plasty was drawn incorporating the defect and placing the expected incisions within the relaxed skin tension lines where possible.    The area thus outlined was incised deep to adipose tissue with a #15 scalpel blade.  The skin margins were undermined to an appropriate distance in all directions utilizing iris scissors. O-Z Plasty Text: The defect edges were debeveled with a #15 scalpel blade.  Given the location of the defect, shape of the defect and the proximity to free margins an O-Z plasty (double transposition flap) was deemed most appropriate.  Using a sterile surgical marker, the appropriate transposition flaps were drawn incorporating the defect and placing the expected incisions within the relaxed skin tension lines where possible.    The area thus outlined was incised deep to adipose tissue with a #15 scalpel blade.  The skin margins were undermined to an appropriate distance in all directions utilizing iris scissors.  Hemostasis was achieved with electrocautery.  The flaps were then transposed into place, one clockwise and the other counterclockwise, and anchored with interrupted buried subcutaneous sutures. Double O-Z Plasty Text: The defect edges were debeveled with a #15 scalpel blade.  Given the location of the defect, shape of the defect and the proximity to free margins a Double O-Z plasty (double transposition flap) was deemed most appropriate.  Using a sterile surgical marker, the appropriate transposition flaps were drawn incorporating the defect and placing the expected incisions within the relaxed skin tension lines where possible. The area thus outlined was incised deep to adipose tissue with a #15 scalpel blade.  The skin margins were undermined to an appropriate distance in all directions utilizing iris scissors.  Hemostasis was achieved with electrocautery.  The flaps were then transposed into place, one clockwise and the other counterclockwise, and anchored with interrupted buried subcutaneous sutures. V-Y Plasty Text: The defect edges were debeveled with a #15 scalpel blade.  Given the location of the defect, shape of the defect and the proximity to free margins an V-Y advancement flap was deemed most appropriate.  Using a sterile surgical marker, an appropriate advancement flap was drawn incorporating the defect and placing the expected incisions within the relaxed skin tension lines where possible.    The area thus outlined was incised deep to adipose tissue with a #15 scalpel blade.  The skin margins were undermined to an appropriate distance in all directions utilizing iris scissors. H Plasty Text: Given the location of the defect, shape of the defect and the proximity to free margins a H-plasty was deemed most appropriate for repair.  Using a sterile surgical marker, the appropriate advancement arms of the H-plasty were drawn incorporating the defect and placing the expected incisions within the relaxed skin tension lines where possible. The area thus outlined was incised deep to adipose tissue with a #15 scalpel blade. The skin margins were undermined to an appropriate distance in all directions utilizing iris scissors.  The opposing advancement arms were then advanced into place in opposite direction and anchored with interrupted buried subcutaneous sutures. W Plasty Text: The lesion was extirpated to the level of the fat with a #15 scalpel blade.  Given the location of the defect, shape of the defect and the proximity to free margins a W-plasty was deemed most appropriate for repair.  Using a sterile surgical marker, the appropriate transposition arms of the W-plasty were drawn incorporating the defect and placing the expected incisions within the relaxed skin tension lines where possible.    The area thus outlined was incised deep to adipose tissue with a #15 scalpel blade.  The skin margins were undermined to an appropriate distance in all directions utilizing iris scissors.  The opposing transposition arms were then transposed into place in opposite direction and anchored with interrupted buried subcutaneous sutures. Z Plasty Text: The lesion was extirpated to the level of the fat with a #15 scalpel blade.  Given the location of the defect, shape of the defect and the proximity to free margins a Z-plasty was deemed most appropriate for repair.  Using a sterile surgical marker, the appropriate transposition arms of the Z-plasty were drawn incorporating the defect and placing the expected incisions within the relaxed skin tension lines where possible.    The area thus outlined was incised deep to adipose tissue with a #15 scalpel blade.  The skin margins were undermined to an appropriate distance in all directions utilizing iris scissors.  The opposing transposition arms were then transposed into place in opposite direction and anchored with interrupted buried subcutaneous sutures. Zygomaticofacial Flap Text: Given the location of the defect, shape of the defect and the proximity to free margins a zygomaticofacial flap was deemed most appropriate for repair.  Using a sterile surgical marker, the appropriate flap was drawn incorporating the defect and placing the expected incisions within the relaxed skin tension lines where possible. The area thus outlined was incised deep to adipose tissue with a #15 scalpel blade with preservation of a vascular pedicle.  The skin margins were undermined to an appropriate distance in all directions utilizing iris scissors.  The flap was then placed into the defect and anchored with interrupted buried subcutaneous sutures. Cheek Interpolation Flap Text: A decision was made to reconstruct the defect utilizing an interpolation axial flap and a staged reconstruction.  A telfa template was made of the defect.  This telfa template was then used to outline the Cheek Interpolation flap.  The donor area for the pedicle flap was then injected with anesthesia.  The flap was excised through the skin and subcutaneous tissue down to the layer of the underlying musculature.  The interpolation flap was carefully excised within this deep plane to maintain its blood supply.  The edges of the donor site were undermined.   The donor site was closed in a primary fashion.  The pedicle was then rotated into position and sutured.  Once the tube was sutured into place, adequate blood supply was confirmed with blanching and refill.  The pedicle was then wrapped with xeroform gauze and dressed appropriately with a telfa and gauze bandage to ensure continued blood supply and protect the attached pedicle. Cheek-To-Nose Interpolation Flap Text: A decision was made to reconstruct the defect utilizing an interpolation axial flap and a staged reconstruction.  A telfa template was made of the defect.  This telfa template was then used to outline the Cheek-To-Nose Interpolation flap.  The donor area for the pedicle flap was then injected with anesthesia.  The flap was excised through the skin and subcutaneous tissue down to the layer of the underlying musculature.  The interpolation flap was carefully excised within this deep plane to maintain its blood supply.  The edges of the donor site were undermined.   The donor site was closed in a primary fashion.  The pedicle was then rotated into position and sutured.  Once the tube was sutured into place, adequate blood supply was confirmed with blanching and refill.  The pedicle was then wrapped with xeroform gauze and dressed appropriately with a telfa and gauze bandage to ensure continued blood supply and protect the attached pedicle. Interpolation Flap Text: A decision was made to reconstruct the defect utilizing an interpolation axial flap and a staged reconstruction.  A telfa template was made of the defect.  This telfa template was then used to outline the interpolation flap.  The donor area for the pedicle flap was then injected with anesthesia.  The flap was excised through the skin and subcutaneous tissue down to the layer of the underlying musculature.  The interpolation flap was carefully excised within this deep plane to maintain its blood supply.  The edges of the donor site were undermined.   The donor site was closed in a primary fashion.  The pedicle was then rotated into position and sutured.  Once the tube was sutured into place, adequate blood supply was confirmed with blanching and refill.  The pedicle was then wrapped with xeroform gauze and dressed appropriately with a telfa and gauze bandage to ensure continued blood supply and protect the attached pedicle. Melolabial Interpolation Flap Text: A decision was made to reconstruct the defect utilizing an interpolation axial flap and a staged reconstruction.  A telfa template was made of the defect.  This telfa template was then used to outline the melolabial interpolation flap.  The donor area for the pedicle flap was then injected with anesthesia.  The flap was excised through the skin and subcutaneous tissue down to the layer of the underlying musculature.  The pedicle flap was carefully excised within this deep plane to maintain its blood supply.  The edges of the donor site were undermined.   The donor site was closed in a primary fashion.  The pedicle was then rotated into position and sutured.  Once the tube was sutured into place, adequate blood supply was confirmed with blanching and refill.  The pedicle was then wrapped with xeroform gauze and dressed appropriately with a telfa and gauze bandage to ensure continued blood supply and protect the attached pedicle. Mastoid Interpolation Flap Text: A decision was made to reconstruct the defect utilizing an interpolation axial flap and a staged reconstruction.  A telfa template was made of the defect.  This telfa template was then used to outline the mastoid interpolation flap.  The donor area for the pedicle flap was then injected with anesthesia.  The flap was excised through the skin and subcutaneous tissue down to the layer of the underlying musculature.  The pedicle flap was carefully excised within this deep plane to maintain its blood supply.  The edges of the donor site were undermined.   The donor site was closed in a primary fashion.  The pedicle was then rotated into position and sutured.  Once the tube was sutured into place, adequate blood supply was confirmed with blanching and refill.  The pedicle was then wrapped with xeroform gauze and dressed appropriately with a telfa and gauze bandage to ensure continued blood supply and protect the attached pedicle. Posterior Auricular Interpolation Flap Text: A decision was made to reconstruct the defect utilizing an interpolation axial flap and a staged reconstruction.  A telfa template was made of the defect.  This telfa template was then used to outline the posterior auricular interpolation flap.  The donor area for the pedicle flap was then injected with anesthesia.  The flap was excised through the skin and subcutaneous tissue down to the layer of the underlying musculature.  The pedicle flap was carefully excised within this deep plane to maintain its blood supply.  The edges of the donor site were undermined.   The donor site was closed in a primary fashion.  The pedicle was then rotated into position and sutured.  Once the tube was sutured into place, adequate blood supply was confirmed with blanching and refill.  The pedicle was then wrapped with xeroform gauze and dressed appropriately with a telfa and gauze bandage to ensure continued blood supply and protect the attached pedicle. Paramedian Forehead Flap Text: A decision was made to reconstruct the defect utilizing an interpolation axial flap and a staged reconstruction.  A telfa template was made of the defect.  This telfa template was then used to outline the paramedian forehead pedicle flap.  The donor area for the pedicle flap was then injected with anesthesia.  The flap was excised through the skin and subcutaneous tissue down to the layer of the underlying musculature.  The pedicle flap was carefully excised within this deep plane to maintain its blood supply.  The edges of the donor site were undermined.   The donor site was closed in a primary fashion.  The pedicle was then rotated into position and sutured.  Once the tube was sutured into place, adequate blood supply was confirmed with blanching and refill.  The pedicle was then wrapped with xeroform gauze and dressed appropriately with a telfa and gauze bandage to ensure continued blood supply and protect the attached pedicle. Lip Wedge Excision Repair Text: Given the location of the defect and the proximity to free margins a full thickness wedge repair was deemed most appropriate.  Using a sterile surgical marker, the appropriate repair was drawn incorporating the defect and placing the expected incisions perpendicular to the vermilion border.  The vermilion border was also meticulously outlined to ensure appropriate reapproximation during the repair.  The area thus outlined was incised through and through with a #15 scalpel blade.  The muscularis and dermis were reaproximated with deep sutures following hemostasis. Care was taken to realign the vermilion border before proceeding with the superficial closure.  Once the vermilion was realigned the superfical and mucosal closure was finished. Ftsg Text: The defect edges were debeveled with a #15 scalpel blade.  Given the location of the defect, shape of the defect and the proximity to free margins a full thickness skin graft was deemed most appropriate.  Using a sterile surgical marker, the primary defect shape was transferred to the donor site. The area thus outlined was incised deep to adipose tissue with a #15 scalpel blade.  The harvested graft was then trimmed of adipose tissue until only dermis and epidermis was left.  The skin margins of the secondary defect were undermined to an appropriate distance in all directions utilizing iris scissors.  The secondary defect was closed with interrupted buried subcutaneous sutures.  The skin edges were then re-apposed with running  sutures.  The skin graft was then placed in the primary defect and oriented appropriately. Split-Thickness Skin Graft Text: The defect edges were debeveled with a #15 scalpel blade.  Given the location of the defect, shape of the defect and the proximity to free margins a split thickness skin graft was deemed most appropriate.  Using a sterile surgical marker, the primary defect shape was transferred to the donor site. The split thickness graft was then harvested.  The skin graft was then placed in the primary defect and oriented appropriately. Burow's Graft Text: The defect edges were debeveled with a #15 scalpel blade.  Given the location of the defect, shape of the defect, the proximity to free margins and the presence of a standing cone deformity a Burow's skin graft was deemed most appropriate. The standing cone was removed and this tissue was then trimmed to the shape of the primary defect. The adipose tissue was also removed until only dermis and epidermis were left.  The skin margins of the secondary defect were undermined to an appropriate distance in all directions utilizing iris scissors.  The secondary defect was closed with interrupted buried subcutaneous sutures.  The skin edges were then re-apposed with running  sutures.  The skin graft was then placed in the primary defect and oriented appropriately. Cartilage Graft Text: The defect edges were debeveled with a #15 scalpel blade.  Given the location of the defect, shape of the defect, the fact the defect involved a full thickness cartilage defect a cartilage graft was deemed most appropriate.  An appropriate donor site was identified, cleansed, and anesthetized. The cartilage graft was then harvested and transferred to the recipient site, oriented appropriately and then sutured into place.  The secondary defect was then repaired using a primary closure. Composite Graft Text: The defect edges were debeveled with a #15 scalpel blade.  Given the location of the defect, shape of the defect, the proximity to free margins and the fact the defect was full thickness a composite graft was deemed most appropriate.  The defect was outline and then transferred to the donor site.  A full thickness graft was then excised from the donor site. The graft was then placed in the primary defect, oriented appropriately and then sutured into place.  The secondary defect was then repaired using a primary closure. Epidermal Autograft Text: The defect edges were debeveled with a #15 scalpel blade.  Given the location of the defect, shape of the defect and the proximity to free margins an epidermal autograft was deemed most appropriate.  Using a sterile surgical marker, the primary defect shape was transferred to the donor site. The epidermal graft was then harvested.  The skin graft was then placed in the primary defect and oriented appropriately. Dermal Autograft Text: The defect edges were debeveled with a #15 scalpel blade.  Given the location of the defect, shape of the defect and the proximity to free margins a dermal autograft was deemed most appropriate.  Using a sterile surgical marker, the primary defect shape was transferred to the donor site. The area thus outlined was incised deep to adipose tissue with a #15 scalpel blade.  The harvested graft was then trimmed of adipose and epidermal tissue until only dermis was left.  The skin graft was then placed in the primary defect and oriented appropriately. Skin Substitute Text: The defect edges were debeveled with a #15 scalpel blade.  Given the location of the defect, shape of the defect and the proximity to free margins a skin substitute graft was deemed most appropriate.  The graft material was trimmed to fit the size of the defect. The graft was then placed in the primary defect and oriented appropriately. Tissue Cultured Epidermal Autograft Text: The defect edges were debeveled with a #15 scalpel blade.  Given the location of the defect, shape of the defect and the proximity to free margins a tissue cultured epidermal autograft was deemed most appropriate.  The graft was then trimmed to fit the size of the defect.  The graft was then placed in the primary defect and oriented appropriately. Xenograft Text: The defect edges were debeveled with a #15 scalpel blade.  Given the location of the defect, shape of the defect and the proximity to free margins a xenograft was deemed most appropriate.  The graft was then trimmed to fit the size of the defect.  The graft was then placed in the primary defect and oriented appropriately. Purse String (Intermediate) Text: Given the location of the defect and the characteristics of the surrounding skin a purse string intermediate closure was deemed most appropriate.  Undermining was performed circumfirentially around the surgical defect.  A purse string suture was then placed and tightened. Purse String (Simple) Text: Given the location of the defect and the characteristics of the surrounding skin a purse string simple closure was deemed most appropriate.  Undermining was performed circumferentially around the surgical defect.  A purse string suture was then placed and tightened. Partial Purse String (Intermediate) Text: Given the location of the defect and the characteristics of the surrounding skin an intermediate purse string closure was deemed most appropriate.  Undermining was performed circumferentially around the surgical defect.  A purse string suture was then placed and tightened. Wound tension of the circular defect prevented complete closure of the wound. Partial Purse String (Simple) Text: Given the location of the defect and the characteristics of the surrounding skin a simple purse string closure was deemed most appropriate.  Undermining was performed circumferentially around the surgical defect.  A purse string suture was then placed and tightened. Wound tension of the circular defect prevented complete closure of the wound. Complex Repair And Single Advancement Flap Text: The defect edges were debeveled with a #15 scalpel blade.  The primary defect was closed partially with a complex linear closure.  Given the location of the remaining defect, shape of the defect and the proximity to free margins a single advancement flap was deemed most appropriate for complete closure of the defect.  Using a sterile surgical marker, an appropriate advancement flap was drawn incorporating the defect and placing the expected incisions within the relaxed skin tension lines where possible.    The area thus outlined was incised deep to adipose tissue with a #15 scalpel blade.  The skin margins were undermined to an appropriate distance in all directions utilizing iris scissors. Complex Repair And Double Advancement Flap Text: The defect edges were debeveled with a #15 scalpel blade.  The primary defect was closed partially with a complex linear closure.  Given the location of the remaining defect, shape of the defect and the proximity to free margins a double advancement flap was deemed most appropriate for complete closure of the defect.  Using a sterile surgical marker, an appropriate advancement flap was drawn incorporating the defect and placing the expected incisions within the relaxed skin tension lines where possible.    The area thus outlined was incised deep to adipose tissue with a #15 scalpel blade.  The skin margins were undermined to an appropriate distance in all directions utilizing iris scissors. Complex Repair And Modified Advancement Flap Text: The defect edges were debeveled with a #15 scalpel blade.  The primary defect was closed partially with a complex linear closure.  Given the location of the remaining defect, shape of the defect and the proximity to free margins a modified advancement flap was deemed most appropriate for complete closure of the defect.  Using a sterile surgical marker, an appropriate advancement flap was drawn incorporating the defect and placing the expected incisions within the relaxed skin tension lines where possible.    The area thus outlined was incised deep to adipose tissue with a #15 scalpel blade.  The skin margins were undermined to an appropriate distance in all directions utilizing iris scissors. Complex Repair And A-T Advancement Flap Text: The defect edges were debeveled with a #15 scalpel blade.  The primary defect was closed partially with a complex linear closure.  Given the location of the remaining defect, shape of the defect and the proximity to free margins an A-T advancement flap was deemed most appropriate for complete closure of the defect.  Using a sterile surgical marker, an appropriate advancement flap was drawn incorporating the defect and placing the expected incisions within the relaxed skin tension lines where possible.    The area thus outlined was incised deep to adipose tissue with a #15 scalpel blade.  The skin margins were undermined to an appropriate distance in all directions utilizing iris scissors. Complex Repair And O-T Advancement Flap Text: The defect edges were debeveled with a #15 scalpel blade.  The primary defect was closed partially with a complex linear closure.  Given the location of the remaining defect, shape of the defect and the proximity to free margins an O-T advancement flap was deemed most appropriate for complete closure of the defect.  Using a sterile surgical marker, an appropriate advancement flap was drawn incorporating the defect and placing the expected incisions within the relaxed skin tension lines where possible.    The area thus outlined was incised deep to adipose tissue with a #15 scalpel blade.  The skin margins were undermined to an appropriate distance in all directions utilizing iris scissors. Complex Repair And O-L Flap Text: The defect edges were debeveled with a #15 scalpel blade.  The primary defect was closed partially with a complex linear closure.  Given the location of the remaining defect, shape of the defect and the proximity to free margins an O-L flap was deemed most appropriate for complete closure of the defect.  Using a sterile surgical marker, an appropriate flap was drawn incorporating the defect and placing the expected incisions within the relaxed skin tension lines where possible.    The area thus outlined was incised deep to adipose tissue with a #15 scalpel blade.  The skin margins were undermined to an appropriate distance in all directions utilizing iris scissors. Complex Repair And Bilobe Flap Text: The defect edges were debeveled with a #15 scalpel blade.  The primary defect was closed partially with a complex linear closure.  Given the location of the remaining defect, shape of the defect and the proximity to free margins a bilobe flap was deemed most appropriate for complete closure of the defect.  Using a sterile surgical marker, an appropriate advancement flap was drawn incorporating the defect and placing the expected incisions within the relaxed skin tension lines where possible.    The area thus outlined was incised deep to adipose tissue with a #15 scalpel blade.  The skin margins were undermined to an appropriate distance in all directions utilizing iris scissors. Complex Repair And Melolabial Flap Text: The defect edges were debeveled with a #15 scalpel blade.  The primary defect was closed partially with a complex linear closure.  Given the location of the remaining defect, shape of the defect and the proximity to free margins a melolabial flap was deemed most appropriate for complete closure of the defect.  Using a sterile surgical marker, an appropriate advancement flap was drawn incorporating the defect and placing the expected incisions within the relaxed skin tension lines where possible.    The area thus outlined was incised deep to adipose tissue with a #15 scalpel blade.  The skin margins were undermined to an appropriate distance in all directions utilizing iris scissors. Complex Repair And Rotation Flap Text: The defect edges were debeveled with a #15 scalpel blade.  The primary defect was closed partially with a complex linear closure.  Given the location of the remaining defect, shape of the defect and the proximity to free margins a rotation flap was deemed most appropriate for complete closure of the defect.  Using a sterile surgical marker, an appropriate advancement flap was drawn incorporating the defect and placing the expected incisions within the relaxed skin tension lines where possible.    The area thus outlined was incised deep to adipose tissue with a #15 scalpel blade.  The skin margins were undermined to an appropriate distance in all directions utilizing iris scissors. Complex Repair And Rhombic Flap Text: The defect edges were debeveled with a #15 scalpel blade.  The primary defect was closed partially with a complex linear closure.  Given the location of the remaining defect, shape of the defect and the proximity to free margins a rhombic flap was deemed most appropriate for complete closure of the defect.  Using a sterile surgical marker, an appropriate advancement flap was drawn incorporating the defect and placing the expected incisions within the relaxed skin tension lines where possible.    The area thus outlined was incised deep to adipose tissue with a #15 scalpel blade.  The skin margins were undermined to an appropriate distance in all directions utilizing iris scissors. Complex Repair And Transposition Flap Text: The defect edges were debeveled with a #15 scalpel blade.  The primary defect was closed partially with a complex linear closure.  Given the location of the remaining defect, shape of the defect and the proximity to free margins a transposition flap was deemed most appropriate for complete closure of the defect.  Using a sterile surgical marker, an appropriate advancement flap was drawn incorporating the defect and placing the expected incisions within the relaxed skin tension lines where possible.    The area thus outlined was incised deep to adipose tissue with a #15 scalpel blade.  The skin margins were undermined to an appropriate distance in all directions utilizing iris scissors. Complex Repair And V-Y Plasty Text: The defect edges were debeveled with a #15 scalpel blade.  The primary defect was closed partially with a complex linear closure.  Given the location of the remaining defect, shape of the defect and the proximity to free margins a V-Y plasty was deemed most appropriate for complete closure of the defect.  Using a sterile surgical marker, an appropriate advancement flap was drawn incorporating the defect and placing the expected incisions within the relaxed skin tension lines where possible.    The area thus outlined was incised deep to adipose tissue with a #15 scalpel blade.  The skin margins were undermined to an appropriate distance in all directions utilizing iris scissors. Complex Repair And M Plasty Text: The defect edges were debeveled with a #15 scalpel blade.  The primary defect was closed partially with a complex linear closure.  Given the location of the remaining defect, shape of the defect and the proximity to free margins an M plasty was deemed most appropriate for complete closure of the defect.  Using a sterile surgical marker, an appropriate advancement flap was drawn incorporating the defect and placing the expected incisions within the relaxed skin tension lines where possible.    The area thus outlined was incised deep to adipose tissue with a #15 scalpel blade.  The skin margins were undermined to an appropriate distance in all directions utilizing iris scissors. Complex Repair And Double M Plasty Text: The defect edges were debeveled with a #15 scalpel blade.  The primary defect was closed partially with a complex linear closure.  Given the location of the remaining defect, shape of the defect and the proximity to free margins a double M plasty was deemed most appropriate for complete closure of the defect.  Using a sterile surgical marker, an appropriate advancement flap was drawn incorporating the defect and placing the expected incisions within the relaxed skin tension lines where possible.    The area thus outlined was incised deep to adipose tissue with a #15 scalpel blade.  The skin margins were undermined to an appropriate distance in all directions utilizing iris scissors. Complex Repair And W Plasty Text: The defect edges were debeveled with a #15 scalpel blade.  The primary defect was closed partially with a complex linear closure.  Given the location of the remaining defect, shape of the defect and the proximity to free margins a W plasty was deemed most appropriate for complete closure of the defect.  Using a sterile surgical marker, an appropriate advancement flap was drawn incorporating the defect and placing the expected incisions within the relaxed skin tension lines where possible.    The area thus outlined was incised deep to adipose tissue with a #15 scalpel blade.  The skin margins were undermined to an appropriate distance in all directions utilizing iris scissors. Complex Repair And Z Plasty Text: The defect edges were debeveled with a #15 scalpel blade.  The primary defect was closed partially with a complex linear closure.  Given the location of the remaining defect, shape of the defect and the proximity to free margins a Z plasty was deemed most appropriate for complete closure of the defect.  Using a sterile surgical marker, an appropriate advancement flap was drawn incorporating the defect and placing the expected incisions within the relaxed skin tension lines where possible.    The area thus outlined was incised deep to adipose tissue with a #15 scalpel blade.  The skin margins were undermined to an appropriate distance in all directions utilizing iris scissors. Complex Repair And Dorsal Nasal Flap Text: The defect edges were debeveled with a #15 scalpel blade.  The primary defect was closed partially with a complex linear closure.  Given the location of the remaining defect, shape of the defect and the proximity to free margins a dorsal nasal flap was deemed most appropriate for complete closure of the defect.  Using a sterile surgical marker, an appropriate flap was drawn incorporating the defect and placing the expected incisions within the relaxed skin tension lines where possible.    The area thus outlined was incised deep to adipose tissue with a #15 scalpel blade.  The skin margins were undermined to an appropriate distance in all directions utilizing iris scissors. Complex Repair And Ftsg Text: The defect edges were debeveled with a #15 scalpel blade.  The primary defect was closed partially with a complex linear closure.  Given the location of the defect, shape of the defect and the proximity to free margins a full thickness skin graft was deemed most appropriate to repair the remaining defect.  The graft was trimmed to fit the size of the remaining defect.  The graft was then placed in the primary defect, oriented appropriately, and sutured into place. Complex Repair And Burow's Graft Text: The defect edges were debeveled with a #15 scalpel blade.  The primary defect was closed partially with a complex linear closure.  Given the location of the defect, shape of the defect, the proximity to free margins and the presence of a standing cone deformity a Burow's graft was deemed most appropriate to repair the remaining defect.  The graft was trimmed to fit the size of the remaining defect.  The graft was then placed in the primary defect, oriented appropriately, and sutured into place. Complex Repair And Split-Thickness Skin Graft Text: The defect edges were debeveled with a #15 scalpel blade.  The primary defect was closed partially with a complex linear closure.  Given the location of the defect, shape of the defect and the proximity to free margins a split thickness skin graft was deemed most appropriate to repair the remaining defect.  The graft was trimmed to fit the size of the remaining defect.  The graft was then placed in the primary defect, oriented appropriately, and sutured into place. Complex Repair And Epidermal Autograft Text: The defect edges were debeveled with a #15 scalpel blade.  The primary defect was closed partially with a complex linear closure.  Given the location of the defect, shape of the defect and the proximity to free margins an epidermal autograft was deemed most appropriate to repair the remaining defect.  The graft was trimmed to fit the size of the remaining defect.  The graft was then placed in the primary defect, oriented appropriately, and sutured into place. Complex Repair And Dermal Autograft Text: The defect edges were debeveled with a #15 scalpel blade.  The primary defect was closed partially with a complex linear closure.  Given the location of the defect, shape of the defect and the proximity to free margins an dermal autograft was deemed most appropriate to repair the remaining defect.  The graft was trimmed to fit the size of the remaining defect.  The graft was then placed in the primary defect, oriented appropriately, and sutured into place. Complex Repair And Tissue Cultured Epidermal Autograft Text: The defect edges were debeveled with a #15 scalpel blade.  The primary defect was closed partially with a complex linear closure.  Given the location of the defect, shape of the defect and the proximity to free margins an tissue cultured epidermal autograft was deemed most appropriate to repair the remaining defect.  The graft was trimmed to fit the size of the remaining defect.  The graft was then placed in the primary defect, oriented appropriately, and sutured into place. Complex Repair And Xenograft Text: The defect edges were debeveled with a #15 scalpel blade.  The primary defect was closed partially with a complex linear closure.  Given the location of the defect, shape of the defect and the proximity to free margins a xenograft was deemed most appropriate to repair the remaining defect.  The graft was trimmed to fit the size of the remaining defect.  The graft was then placed in the primary defect, oriented appropriately, and sutured into place. Complex Repair And Skin Substitute Graft Text: The defect edges were debeveled with a #15 scalpel blade.  The primary defect was closed partially with a complex linear closure.  Given the location of the remaining defect, shape of the defect and the proximity to free margins a skin substitute graft was deemed most appropriate to repair the remaining defect.  The graft was trimmed to fit the size of the remaining defect.  The graft was then placed in the primary defect, oriented appropriately, and sutured into place. Path Notes (To The Dermatopathologist): Please check margins. Consent was obtained from the patient. The risks and benefits to therapy were discussed in detail. Specifically, the risks of infection, scarring, bleeding, prolonged wound healing, incomplete removal, allergy to anesthesia, nerve injury and recurrence were addressed. Prior to the procedure, the treatment site was clearly identified and confirmed by the patient. All components of Universal Protocol/PAUSE Rule completed. Post-Care Instructions: I reviewed with the patient in detail post-care instructions. Patient is not to engage in any heavy lifting, exercise, or swimming for the next 14 days. Should the patient develop any fevers, chills, bleeding, severe pain patient will contact the office immediately. Home Suture Removal Text: Patient was provided a home suture removal kit and will remove their sutures at home.  If they have any questions or difficulties they will call the office. Where Do You Want The Question To Include Opioid Counseling Located?: Case Summary Tab Information: Selecting Yes will display possible errors in your note based on the variables you have selected. This validation is only offered as a suggestion for you. PLEASE NOTE THAT THE VALIDATION TEXT WILL BE REMOVED WHEN YOU FINALIZE YOUR NOTE. IF YOU WANT TO FAX A PRELIMINARY NOTE YOU WILL NEED TO TOGGLE THIS TO 'NO' IF YOU DO NOT WANT IT IN YOUR FAXED NOTE.

## 2023-02-20 NOTE — CASE MANAGEMENT
Case Management Discharge Planning Note    Patient name Madina Putnam  Location [facility] 68 2 /South 2 Doris Blevins* MRN [id number]  : 1933 Date 2023       Current Admission Date: 2023  Current Admission Diagnosis:Sepsis [facility])   Patient Active Problem List    Diagnosis Date Noted   • Stercoral colitis 2023   • Sepsis ([facility] [address] ) 2023   • Pneumonia 2023   • EILEEN (acute kidney [facility]) ([facility] ) 2023   • Lower extremity edema 2023   • Stage 3a chronic kidney disease ([facility] ) 2022   • Basal cell carcinoma (BCC) of neck 2021   • Anemia 2020   • Vertigo 2020   • Neuropathy 2019   • Anxiety 05/15/2014   • Type 2 diabetes mellitus with diabetic neuropathy, without long-term current use of insulin ([facility] ) 05/15/2014   • Hyperlipidemia 05/15/2014   • Hypertension 05/15/2014   • Hypothyroid 05/15/2014   • Microalbuminuria 2012   • Osteoarthritis 2008      LOS (days): 3  Geometric Mean LOS (GMLOS) (days): 5 00  Days to GMLOS:2 1     OBJECTIVE:  Risk of Unplanned Readmission Score: 14 59         Current admission status: Inpatient   Preferred Pharmacy:   [address] [place] [address]  Phone: [phone] Fax: [phone]  [address] [address]  Phone: [phone] Fax: [phone]    [facility],[address] Mail [facility] - [address] [address]  Phone: [phone] Fax: [phone]    Primary Care Provider: Asia Roberson MD    Primary Insurance: MEDICARE  Secondary Insurance: BLUE CROSS    DISCHARGE DETAILS:    Discharge planning discussed with[de-identified] pt, pt's daughter Sandra [place] of Choice: Yes  Comments - Freedom of Choice: Agreeable to STR- discussed available SNF's as of this date- will discuss further with Gabriela Al with CM to follow up with final decision Tuesday  CM contacted family/caregiver?: Yes  Were Treatment Team discharge recommendations reviewed with patient/caregiver?: Yes  Did patient/caregiver verbalize understanding of patient care needs?: Yes       Contacts  Patient Contacts:  Christian Muhammad to discuss further with Goyo Pearce (recorded conversation to share with her sister))  Relationship to Patient[de-identified] Family  Contact Method:  In Person  Reason/Outcome: Referral, Discharge Planning                        Treatment Team Recommendation: Short Term Rehab  Discharge Destination Plan[de-identified] SNF, Short Term Rehab (location TBD)                                IMM Given (Date):: 02/20/23  IMM Given to[de-identified] Patient

## 2023-02-20 NOTE — ASSESSMENT & PLAN NOTE
· Met sepsis criteria with leukocytosis and tachycardia   · Suspected due to colitis   · Markedly elevated procalcitonin, now with significant improvement, continue to monitor  · Blood cultures with no growth to date   · Continue Rocephin and Flagyl  · Lactate within normal limits

## 2023-02-20 NOTE — PLAN OF CARE
Problem: OCCUPATIONAL THERAPY ADULT  Goal: Performs self-care activities at highest level of function for planned discharge setting  See evaluation for individualized goals  Description: Treatment Interventions: ADL retraining, Functional transfer training, UE strengthening/ROM, Endurance training, Patient/family training, Equipment evaluation/education, Compensatory technique education, Continued evaluation, Energy conservation, Activityengagement          See flowsheet documentation for full assessment, interventions and recommendations  Note: Limitation: Decreased ADL status, Decreased UE strength, Decreased Safe judgement during ADL, Decreased endurance, Decreased self-care trans, Decreased high-level ADLs  Prognosis: Good  Assessment: Patient is a 80y o  year old female seen for OT eval s/p admit to [facility] on 2/17/2023 with sepsis, pneumonia, EILEEN, LE edema, stercoral colitis  Comorbidities affecting pt’s functional performance include a significant PMH of: DM2, HTN, hypothyroid, anxiety, OA, HLD, neuropathy, hx of vertigo, anemia, casal call carcinoma, CKD3  Patient with active OT orders and activity orders for Up with assistance  Personal factors affecting pt at time of IE include: difficulty performing ADLs and IADLs, difficulty with functional mobility/transfers  Prior to admission, patient was (I) with ADLs, requires assistance with IADLs  Upon evaluation, patient’s functional status as follows: eating: SBA, grooming: SBA, UB bathing: MIN A , LB bathing: MOD A, UB dressing: MIN A , LB dressing: MAX A, toileting: MAX A; functional transfers: MOD A, bed mobility: MOD A, functional mobility: MIN A with RW, sitting/standing tolerance: ~1 minute with b/l UE support- due to the following deficits impacting occupational performance: decreased B UE strength, decreased static/dynamic sitting/standing balance, decreased activity tolerance, decreased safety awareness   Patient would benefit from continued skilled OT therapy while in acute setting to address deficits as defined above and maximize (I) with ADLs and functional mobility  Occupational performance areas to address include: grooming, bathing, toileting, UB/LB dressing, functional mobility, household maintenance, and medication management  Based on the aforementioned OT evaluation, functional performance deficits, and assessments, pt has been identified as a high complexity evaluation      The patient's raw score on the AM-PAC Daily Activity Inpatient Short Form is 16  A raw score of less than 19 suggests the patient may benefit from discharge to post-acute rehabilitation services  Please refer to the recommendation of the Occupational Therapist for safe discharge planning       OT Discharge Recommendation: Post acute rehabilitation services

## 2023-02-20 NOTE — ASSESSMENT & PLAN NOTE
· Edema present for several months, since initiation of Actos which has been discontinued  · Venous duplex negative for DVT  · BNP elevated  · Chest xray reviewed, small b/l pleural effusions, "subsegmental atelectasis vs  infiltrate right lung base"  · 2D Echo pending  · IV fluids discontinued  · Today received Lasix 20 mg IV x1

## 2023-02-21 ENCOUNTER — APPOINTMENT (INPATIENT)
Dept: NON INVASIVE DIAGNOSTICS | Facility: HOSPITAL | Age: 88
End: 2023-02-21

## 2023-02-21 LAB
ALBUMIN SERPL BCP-MCNC: 3 G/DL (ref 3.5–5)
ALBUMIN UR ELPH-MCNC: 100 %
ALP SERPL-CCNC: 54 U/L (ref 34–104)
ALPHA1 GLOB MFR UR ELPH: 0 %
ALPHA2 GLOB MFR UR ELPH: 0 %
ALT SERPL W P-5'-P-CCNC: 9 U/L (ref 7–52)
ANION GAP SERPL CALCULATED.3IONS-SCNC: 8 MMOL/L (ref 4–13)
AORTIC ROOT: 2.8 CM
AORTIC VALVE MEAN VELOCITY: 16.8 M/S
AST SERPL W P-5'-P-CCNC: 10 U/L (ref 13–39)
AV AREA BY CONTINUOUS VTI: 0.8 CM2
AV AREA PEAK VELOCITY: 0.8 CM2
AV LVOT MEAN GRADIENT: 1 MMHG
AV LVOT PEAK GRADIENT: 2 MMHG
AV MEAN GRADIENT: 12 MMHG
AV PEAK GRADIENT: 21 MMHG
AV VALVE AREA: 0.82 CM2
AV VELOCITY RATIO: 0.32
B-GLOBULIN MFR UR ELPH: 0 %
BASOPHILS # BLD AUTO: 0.03 THOUSANDS/ÂΜL (ref 0–0.1)
BASOPHILS NFR BLD AUTO: 0 % (ref 0–1)
BILIRUB SERPL-MCNC: 0.36 MG/DL (ref 0.2–1)
BUN SERPL-MCNC: 41 MG/DL (ref 5–25)
CALCIUM ALBUM COR SERPL-MCNC: 8.9 MG/DL (ref 8.3–10.1)
CALCIUM SERPL-MCNC: 8.1 MG/DL (ref 8.4–10.2)
CHLORIDE SERPL-SCNC: 109 MMOL/L (ref 96–108)
CO2 SERPL-SCNC: 19 MMOL/L (ref 21–32)
CREAT SERPL-MCNC: 1.47 MG/DL (ref 0.6–1.3)
DOP CALC AO PEAK VEL: 2.32 M/S
DOP CALC AO VTI: 45.47 CM
DOP CALC LVOT AREA: 2.54 CM2
DOP CALC LVOT DIAMETER: 1.8 CM
DOP CALC LVOT PEAK VEL VTI: 14.64 CM
DOP CALC LVOT PEAK VEL: 0.75 M/S
DOP CALC LVOT STROKE INDEX: 23.7 ML/M2
DOP CALC LVOT STROKE VOLUME: 37.24
DOP CALC MV VTI: 19.51 CM
E WAVE DECELERATION TIME: 128 MS
EOSINOPHIL # BLD AUTO: 0.03 THOUSAND/ÂΜL (ref 0–0.61)
EOSINOPHIL NFR BLD AUTO: 0 % (ref 0–6)
ERYTHROCYTE [DISTWIDTH] IN BLOOD BY AUTOMATED COUNT: 13.1 % (ref 11.6–15.1)
FRACTIONAL SHORTENING: 14 (ref 28–44)
GAMMA GLOB MFR UR ELPH: 0 %
GFR SERPL CREATININE-BSD FRML MDRD: 31 ML/MIN/1.73SQ M
GLOBAL LONGITUIDAL STRAIN: -5 %
GLUCOSE SERPL-MCNC: 110 MG/DL (ref 65–140)
GLUCOSE SERPL-MCNC: 86 MG/DL (ref 65–140)
GLUCOSE SERPL-MCNC: 94 MG/DL (ref 65–140)
HCT VFR BLD AUTO: 34.4 % (ref 34.8–46.1)
HGB BLD-MCNC: 10.8 G/DL (ref 11.5–15.4)
IMM GRANULOCYTES # BLD AUTO: 0.07 THOUSAND/UL (ref 0–0.2)
IMM GRANULOCYTES NFR BLD AUTO: 1 % (ref 0–2)
INTERVENTRICULAR SEPTUM IN DIASTOLE (PARASTERNAL SHORT AXIS VIEW): 1.2 CM
INTERVENTRICULAR SEPTUM: 1.2 CM (ref 0.6–1.1)
KAPPA LC FREE SER-MCNC: 25.9 MG/L (ref 3.3–19.4)
KAPPA LC FREE/LAMBDA FREE SER: 1.19 {RATIO} (ref 0.26–1.65)
LAAS-AP2: 17.8 CM2
LAAS-AP4: 24.4 CM2
LAMBDA LC FREE SERPL-MCNC: 21.7 MG/L (ref 5.7–26.3)
LEFT ATRIUM SIZE: 3.9 CM
LEFT INTERNAL DIMENSION IN SYSTOLE: 4.3 CM (ref 2.1–4)
LEFT VENTRICULAR INTERNAL DIMENSION IN DIASTOLE: 5 CM (ref 3.5–6)
LEFT VENTRICULAR POSTERIOR WALL IN END DIASTOLE: 1.3 CM
LEFT VENTRICULAR STROKE VOLUME: 39 ML
LVSV (TEICH): 39 ML
LYMPHOCYTES # BLD AUTO: 1.59 THOUSANDS/ÂΜL (ref 0.6–4.47)
LYMPHOCYTES NFR BLD AUTO: 20 % (ref 14–44)
MCH RBC QN AUTO: 29.5 PG (ref 26.8–34.3)
MCHC RBC AUTO-ENTMCNC: 31.4 G/DL (ref 31.4–37.4)
MCV RBC AUTO: 94 FL (ref 82–98)
MONOCYTES # BLD AUTO: 0.63 THOUSAND/ÂΜL (ref 0.17–1.22)
MONOCYTES NFR BLD AUTO: 8 % (ref 4–12)
MV E'TISSUE VEL-LAT: 5 CM/S
MV E'TISSUE VEL-SEP: 4 CM/S
MV MEAN GRADIENT: 3 MMHG
MV PEAK A VEL: 1.23 M/S
MV PEAK E VEL: 108 CM/S
MV PEAK GRADIENT: 6 MMHG
MV STENOSIS PRESSURE HALF TIME: 37 MS
MV VALVE AREA BY CONTINUITY EQUATION: 1.91 CM2
MV VALVE AREA P 1/2 METHOD: 5.95
NEUTROPHILS # BLD AUTO: 5.8 THOUSANDS/ÂΜL (ref 1.85–7.62)
NEUTS SEG NFR BLD AUTO: 71 % (ref 43–75)
NRBC BLD AUTO-RTO: 0 /100 WBCS
PLATELET # BLD AUTO: 219 THOUSANDS/UL (ref 149–390)
PMV BLD AUTO: 10.8 FL (ref 8.9–12.7)
POTASSIUM SERPL-SCNC: 4.4 MMOL/L (ref 3.5–5.3)
PROCALCITONIN SERPL-MCNC: 8.03 NG/ML
PROT PATTERN UR ELPH-IMP: NORMAL
PROT SERPL-MCNC: 5.4 G/DL (ref 6.4–8.4)
PROT UR-MCNC: 17 MG/DL
RA PRESSURE ESTIMATED: 8 MMHG
RBC # BLD AUTO: 3.66 MILLION/UL (ref 3.81–5.12)
RIGHT ATRIAL 2D VOLUME: 31 ML
RIGHT ATRIUM AREA SYSTOLE A4C: 12.8 CM2
RIGHT VENTRICLE ID DIMENSION: 3.5 CM
RV PSP: 61 MMHG
SL CV LEFT ATRIUM LENGTH A2C: 5.1 CM
SL CV LV EF: 23
SL CV PED ECHO LEFT VENTRICLE DIASTOLIC VOLUME (MOD BIPLANE) 2D: 120 ML
SL CV PED ECHO LEFT VENTRICLE SYSTOLIC VOLUME (MOD BIPLANE) 2D: 81 ML
SODIUM SERPL-SCNC: 136 MMOL/L (ref 135–147)
TR MAX PG: 53 MMHG
TR PEAK VELOCITY: 3.6 M/S
TRICUSPID ANNULAR PLANE SYSTOLIC EXCURSION: 1.7 CM
TRICUSPID VALVE PEAK REGURGITATION VELOCITY: 3.63 M/S
WBC # BLD AUTO: 8.15 THOUSAND/UL (ref 4.31–10.16)

## 2023-02-21 RX ORDER — NIFEDIPINE 30 MG/1
30 TABLET, EXTENDED RELEASE ORAL 2 TIMES DAILY
Status: DISCONTINUED | OUTPATIENT
Start: 2023-02-21 | End: 2023-02-21

## 2023-02-21 RX ORDER — METRONIDAZOLE 500 MG/1
500 TABLET ORAL EVERY 8 HOURS SCHEDULED
Status: DISCONTINUED | OUTPATIENT
Start: 2023-02-21 | End: 2023-02-23

## 2023-02-21 RX ORDER — SODIUM BICARBONATE 650 MG/1
650 TABLET ORAL
Status: DISCONTINUED | OUTPATIENT
Start: 2023-02-21 | End: 2023-02-22

## 2023-02-21 RX ORDER — NIFEDIPINE 30 MG/1
30 TABLET, EXTENDED RELEASE ORAL 2 TIMES DAILY
Status: DISCONTINUED | OUTPATIENT
Start: 2023-02-21 | End: 2023-02-23

## 2023-02-21 RX ADMIN — SODIUM BICARBONATE 650 MG TABLET 650 MG: at 12:42

## 2023-02-21 RX ADMIN — METRONIDAZOLE 500 MG: 500 INJECTION, SOLUTION INTRAVENOUS at 04:05

## 2023-02-21 RX ADMIN — SODIUM BICARBONATE 650 MG TABLET 650 MG: at 17:10

## 2023-02-21 RX ADMIN — NIFEDIPINE 30 MG: 30 TABLET, FILM COATED, EXTENDED RELEASE ORAL at 15:32

## 2023-02-21 RX ADMIN — PRAVASTATIN SODIUM 40 MG: 40 TABLET ORAL at 15:32

## 2023-02-21 RX ADMIN — HEPARIN SODIUM 5000 UNITS: 5000 INJECTION INTRAVENOUS; SUBCUTANEOUS at 23:07

## 2023-02-21 RX ADMIN — POLYETHYLENE GLYCOL 3350 17 G: 17 POWDER, FOR SOLUTION ORAL at 08:01

## 2023-02-21 RX ADMIN — METRONIDAZOLE 500 MG: 500 TABLET ORAL at 13:51

## 2023-02-21 RX ADMIN — CEFTRIAXONE 1000 MG: 1 INJECTION, SOLUTION INTRAVENOUS at 14:06

## 2023-02-21 RX ADMIN — LEVOTHYROXINE SODIUM 100 MCG: 100 TABLET ORAL at 05:05

## 2023-02-21 RX ADMIN — HEPARIN SODIUM 5000 UNITS: 5000 INJECTION INTRAVENOUS; SUBCUTANEOUS at 05:05

## 2023-02-21 RX ADMIN — DOCUSATE SODIUM 100 MG: 100 CAPSULE, LIQUID FILLED ORAL at 08:01

## 2023-02-21 RX ADMIN — METRONIDAZOLE 500 MG: 500 TABLET ORAL at 23:07

## 2023-02-21 RX ADMIN — METRONIDAZOLE 500 MG: 500 INJECTION, SOLUTION INTRAVENOUS at 09:09

## 2023-02-21 RX ADMIN — DOCUSATE SODIUM 100 MG: 100 CAPSULE, LIQUID FILLED ORAL at 16:03

## 2023-02-21 RX ADMIN — HEPARIN SODIUM 5000 UNITS: 5000 INJECTION INTRAVENOUS; SUBCUTANEOUS at 13:26

## 2023-02-21 RX ADMIN — SENNOSIDES 8.6 MG: 8.6 TABLET ORAL at 08:01

## 2023-02-21 NOTE — CASE MANAGEMENT
Case Management Discharge Planning Note    Patient name Daniel Love [place] 2 /South 2 Geoff Newton* MRN [id number]  : 1933 Date 2023       Current Admission Date: 2023  Current Admission Diagnosis:Sepsis [facility])   Patient Active Problem List    Diagnosis Date Noted   • Hypocalcemia 2023   • Stercoral colitis 2023   • Sepsis ([facility] [address] [facility] ) 2023   • Pneumonia 2023   • EILEEN (acute kidney injury) ([facility] [address] [facility] ) 2023   • Lower extremity edema 2023   • Stage 3a chronic kidney disease ([facility] ) 2022   • Basal cell carcinoma (BCC) of neck 2021   • Anemia 2020   • Vertigo 2020   • Neuropathy 2019   • Anxiety 05/15/2014   • Type 2 diabetes mellitus with diabetic neuropathy, without long-term current use of insulin ([facility] ) 05/15/2014   • Hyperlipidemia 05/15/2014   • Hypertension 05/15/2014   • Hypothyroid 05/15/2014   • Microalbuminuria 2012   • Osteoarthritis 2008      LOS (days): 4  Geometric Mean LOS (GMLOS) (days): 5 00  Days to GMLOS:0 9     OBJECTIVE:  Risk of Unplanned Readmission Score: 13 39         Current admission status: Inpatient   Preferred Pharmacy:   [address] [place] [address]  Phone: [phone] Fax: [phone]  [address] [place] [address]  Phone: [phone] Fax: [phone]    [address] [facility],[address] Floor Mail Delivery - [address] [address]  Phone: [phone] Fax: [phone]    Primary Care Provider: Deya Esteves MD    Primary Insurance: MEDICARE  Secondary Insurance: BLUE CROSS    DISCHARGE DETAILS:    Discharge planning discussed with[de-identified] pt and pt's daughter [place] of Choice: Yes  Comments - Freedom of Choice: pt/daughter (POA) leaning towards [facility] or [facility]- will make a final decision Wednesday, informing the covering SW of same- [facility] facilities aware of this and of anticipated d/c date for Thursday    CM contacted family/caregiver?: Yes  Were Treatment Team discharge recommendations reviewed with patient/caregiver?: Yes  Did patient/caregiver verbalize understanding of patient care needs?: Yes       Contacts  Patient Contacts: David  Relationship to Patient[de-identified] Family  Contact Method: Phone  Phone Number: [phone]  Reason/Outcome: Discharge Planning                        Treatment Team Recommendation: Short Term Rehab  Discharge Destination Plan[de-identified] SNF, Short Term Rehab (TBD)  Transport at Discharge : [facility] (Out of pocket cost discussed with Yovani Coyle being in agreement to same- will inform of cost range once arranged )                             IMM Given (Date):: 02/21/23  IMM Given to[de-identified] Family (Dtr Yovani Coyle ([address]))

## 2023-02-21 NOTE — PLAN OF CARE
Problem: Potential for Falls  Goal: Patient will remain free of falls  Description: INTERVENTIONS:  - Educate patient/family on patient safety including physical limitations  - Instruct patient to call for assistance with activity   - Consult OT/PT to assist with strengthening/mobility   - Keep Call bell within reach  - Keep bed low and locked with side rails adjusted as appropriate  - Keep care items and personal belongings within reach  - Initiate and maintain comfort rounds  - Make Fall Risk Sign visible to staff  - Offer Toileting every  Hours, in advance of need  - Initiate/Maintain alarm  - Obtain necessary fall risk management equipment:   - Apply yellow socks and bracelet for high fall risk patients  - Consider moving patient to room near nurses station  Outcome: Progressing     Problem: MOBILITY - ADULT  Goal: Maintain or return to baseline ADL function  Description: INTERVENTIONS:  -  Assess patient's ability to carry out ADLs; assess patient's baseline for ADL function and identify physical deficits which impact ability to perform ADLs (bathing, care of mouth/teeth, toileting, grooming, dressing, etc )  - Assess/evaluate cause of self-care deficits   - Assess range of motion  - Assess patient's mobility; develop plan if impaired  - Assess patient's need for assistive devices and provide as appropriate  - Encourage maximum independence but intervene and supervise when necessary  - Involve family in performance of ADLs  - Assess for home care needs following discharge   - Consider OT consult to assist with ADL evaluation and planning for discharge  - Provide patient education as appropriate  Outcome: Progressing  Goal: Maintains/Returns to pre admission functional level  Description: INTERVENTIONS:  - Perform BMAT or MOVE assessment daily    - Set and communicate daily mobility goal to care team and patient/family/caregiver     - Collaborate with rehabilitation services on mobility goals if consulted  - Perform Range of Motion  times a day  - Reposition patient every  hours  - Dangle patient times a day  - Stand patient  times a day  - Ambulate patient  times a day  - Out of bed to chair  times a day   - Out of bed for meals  times a day  - Out of bed for toileting  - Record patient progress and toleration of activity level   Outcome: Progressing     Problem: GASTROINTESTINAL - ADULT  Goal: Maintains or returns to baseline bowel function  Description: INTERVENTIONS:  - Assess bowel function  - Encourage oral fluids to ensure adequate hydration  - Administer IV fluids if ordered to ensure adequate hydration  - Administer ordered medications as needed  - Encourage mobilization and activity  - Consider nutritional services referral to assist patient with adequate nutrition and appropriate food choices  Outcome: Progressing     Problem: Knowledge Deficit  Goal: Patient/family/caregiver demonstrates understanding of disease process, treatment plan, medications, and discharge instructions  Description: Complete learning assessment and assess knowledge base    Interventions:  - Provide teaching at level of understanding  - Provide teaching via preferred learning methods  Outcome: Progressing     Problem: Prexisting or High Potential for Compromised Skin Integrity  Goal: Skin integrity is maintained or improved  Description: INTERVENTIONS:  - Identify patients at risk for skin breakdown  - Assess and monitor skin integrity  - Assess and monitor nutrition and hydration status  - Monitor labs   - Assess for incontinence   - Turn and reposition patient  - Assist with mobility/ambulation  - Relieve pressure over bony prominences  - Avoid friction and shearing  - Provide appropriate hygiene as needed including keeping skin clean and dry  - Evaluate need for skin moisturizer/barrier cream  - Collaborate with interdisciplinary team   - Patient/family teaching  - Consider wound care consult   Outcome: Progressing     Problem: INFECTION - ADULT  Goal: Absence or prevention of progression during hospitalization  Description: INTERVENTIONS:  - Assess and monitor for signs and symptoms of infection  - Monitor lab/diagnostic results  - Monitor all insertion sites, i e  indwelling lines, tubes, and drains  - Monitor endotracheal if appropriate and nasal secretions for changes in amount and color  - [place] appropriate cooling/warming therapies per order  - Administer medications as ordered  - Instruct and encourage patient and family to use good hand hygiene technique  - Identify and instruct in appropriate isolation precautions for identified infection/condition  Outcome: Progressing     Problem: SAFETY ADULT  Goal: Patient will remain free of falls  Description: INTERVENTIONS:  - Educate patient/family on patient safety including physical limitations  - Instruct patient to call for assistance with activity   - Consult OT/PT to assist with strengthening/mobility   - Keep Call bell within reach  - Keep bed low and locked with side rails adjusted as appropriate  - Keep care items and personal belongings within reach  - Initiate and maintain comfort rounds  - Make Fall Risk Sign visible to staff  - Offer Toileting every  Hours, in advance of need  - Initiate/Maintainalarm  - Obtain necessary fall risk management equipment:   - Apply yellow socks and bracelet for high fall risk patients  - Consider moving patient to room near nurses station  Outcome: Progressing  Goal: Maintain or return to baseline ADL function  Description: INTERVENTIONS:  -  Assess patient's ability to carry out ADLs; assess patient's baseline for ADL function and identify physical deficits which impact ability to perform ADLs (bathing, care of mouth/teeth, toileting, grooming, dressing, etc )  - Assess/evaluate cause of self-care deficits   - Assess range of motion  - Assess patient's mobility; develop plan if impaired  - Assess patient's need for assistive devices and provide as appropriate  - Encourage maximum independence but intervene and supervise when necessary  - Involve family in performance of ADLs  - Assess for home care needs following discharge   - Consider OT consult to assist with ADL evaluation and planning for discharge  - Provide patient education as appropriate  Outcome: Progressing  Goal: Maintains/Returns to pre admission functional level  Description: INTERVENTIONS:  - Perform BMAT or MOVE assessment daily    - Set and communicate daily mobility goal to care team and patient/family/caregiver  - Collaborate with rehabilitation services on mobility goals if consulted  - Perform Range of Motion times a day  - Reposition patient every  hours    - Dangle patient  times a day  - Stand patient  times a day  - Ambulate patient  times a day  - Out of bed to chair  times a day   - Out of bed for meals  times a day  - Out of bed for toileting  - Record patient progress and toleration of activity level   Outcome: Progressing     Problem: DISCHARGE PLANNING  Goal: Discharge to home or other facility with appropriate resources  Description: INTERVENTIONS:  - Identify barriers to discharge w/patient and caregiver  - Arrange for needed discharge resources and transportation as appropriate  - Identify discharge learning needs (meds, wound care, etc )  - Arrange for interpretive services to assist at discharge as needed  - Refer to Case Management Department for coordinating discharge planning if the patient needs post-hospital services based on physician/advanced practitioner order or complex needs related to functional status, cognitive ability, or social support system  Outcome: Progressing     Problem: METABOLIC, FLUID AND ELECTROLYTES - ADULT  Goal: Fluid balance maintained  Description: INTERVENTIONS:  - Monitor labs   - Monitor I/O and WT  - Instruct patient on fluid and nutrition as appropriate  - Assess for signs & symptoms of volume excess or deficit  Outcome: Progressing     Problem: SKIN/TISSUE INTEGRITY - ADULT  Goal: Skin Integrity remains intact(Skin Breakdown Prevention)  Description: Assess:  -Perform [place] assessment every   -Clean and moisturize skin every   -Inspect skin when repositioning, toileting, and assisting with ADLS  -Assess under medical devices such as  every   -Assess extremities for adequate circulation and sensation     Bed Management:  -Have minimal linens on bed & keep smooth, unwrinkled  -Change linens as needed when moist or perspiring  -Avoid sitting or lying in one position for more than  hours while in bed  -Keep HOB at degrees     Toileting:  -Offer bedside commode  -Assess for incontinence every   -Use incontinent care products after each incontinent episode such as    Activity:  -Mobilize lucille times a day  -Encourage activity and walks on unit  -Encourage or provide ROM exercises   -Turn and reposition patient every  Hours  -Use appropriate equipment to lift or move patient in bed  -Instruct/ Assist with weight shifting every  when out of bed in chair  -Consider limitation of chair time  hour intervals    Skin Care:  -Avoid use of baby powder, tape, friction and shearing, hot water or constrictive clothing  -Relieve pressure over bony prominences using   -Do not massage red bony areas    Next Steps:  -Teach patient strategies to minimize risks such as    -Consider consults to  interdisciplinary teams such as   Outcome: Progressing

## 2023-02-21 NOTE — ASSESSMENT & PLAN NOTE
This is a 68-year-old female with history of diabetes mellitus, hypertension, hypothyroidism, neuropathy, basal cell carcinoma presented with 3-day history of nonbloody diarrhea      · Met sepsis criteria with leukocytosis and tachycardia   · Suspected due to colitis   · Markedly elevated procalcitonin, now with significant improvement, continue to monitor  · Blood cultures with no growth to date   · Continue Rocephin and Flagyl D6

## 2023-02-21 NOTE — PLAN OF CARE
Problem: PHYSICAL THERAPY ADULT  Goal: Performs mobility at highest level of function for planned discharge setting  See evaluation for individualized goals  Description: Treatment/Interventions: Functional transfer training, LE strengthening/ROM, Therapeutic exercise, Endurance training, Patient/family training, Bed mobility, Gait training, Spoke to nursing, OT  Equipment Recommended: Steve Pedroza (Pt owns walker)       See flowsheet documentation for full assessment, interventions and recommendations  Outcome: Progressing  Note: Prognosis: Good  Problem List: Decreased strength, Decreased endurance, Impaired balance, Decreased mobility, Decreased range of motion, Impaired judgement  Assessment: Pt seen for PT per POC  Improved mobility & activity tolerance noted this tx session  Pt require S for bed mobility however continue to require modAx1 for transfers & amb w/ RW + cues for techniques & safety  Gait deviations as above, slow & unsteady w/ WBOS & ataxic gait but no gross LOB noted  Dec amb tolerance 2* to weakness & impaired balance  Pt tolerated above mentioned thera  ex well  Pt require cues for proper exercise techniques and performance  No SOB reported however noted that pt desating at RA w/ SpO2 85-89% hence given 1L O2 NC w/ improved SpO2 to 91-92%  Also noted pt w/ elevated BP, 180/103 at rest & 183/104 after activity  RN made aware  Please see flowsheet above for objective findings & levels of assistance required for all functional tasks during this tx session  Nsg staff most recent vital signs as follows: BP (!) 183/104   Pulse (!) 112   Temp 98 5 °F (36 9 °C) (Oral)   Resp 18   Ht 4' 9" (1 448 m)   Wt 64 9 kg (143 lb)   SpO2 91%   BMI 30 94 kg/m²   Will continue PT per POC  At end of session, pt OOB in chair in stable condition, call bell & phone in reach, chair alarm activated, all lines intact  Fall precautions reinforced w/ good understanding   The patient's AM-PAC Basic Mobility Inpatient Short Form Raw Score is 13  A Raw score of less than or equal to 16 suggests the patient may benefit from discharge to post-acute rehabilitation services  Please also refer to the recommendation of the Physical Therapist for safe discharge planning  From PT standpoint, will continue to recommend inpt rehab at D/C  CM to follow  Nsg staff to continue to mobilized pt (OOB in chair for all meals & ambulate in room/unit) as tolerated to prevent decline in function  Nsg notified  Co-tx was necessary to complete this PT tx session for the pt's best interest given pt's medical acuity & complexity  PT Discharge Recommendation: Post acute rehabilitation services    See flowsheet documentation for full assessment

## 2023-02-21 NOTE — PHYSICAL THERAPY NOTE
PT PROGRESS NOTE    Name: Santosh Grace  AGE: 80 y o  MRN: [id number]  LENGTH OF STAY: 4    Admitting Dx:  Diarrhea [R19 7]  Weakness [R53 1]  Pleural effusion [J90]  EILEEN (acute kidney injury) ([facility] [address] ) [N17 9]  Sepsis ([facility] [address] ) [A41 9]  Right lower lobe pulmonary infiltrate [R91 8]  Stercoral colitis [K52 89]      Patient Active Problem List   Diagnosis    Anxiety    Osteoarthritis    Type 2 diabetes mellitus with diabetic neuropathy, without long-term current use of insulin ([facility] [address] )    Hyperlipidemia    Hypertension    Hypothyroid    Microalbuminuria    Neuropathy    Vertigo    Anemia    Basal cell carcinoma (BCC) of neck    Stage 3a chronic kidney disease (HCC)    Stercoral colitis    Sepsis ([facility] [address] )    Pneumonia    EILEEN (acute kidney injury) ([facility] [address] )    Lower extremity edema    Hypocalcemia               02/21/23 1452   PT Last Visit   PT Visit Date 02/21/23   Note Type   Note Type Treatment   Pain Assessment   Pain Score No Pain   Restrictions/Precautions   Weight Bearing Precautions Per Order No   Other Precautions Chair Alarm; Bed Alarm;Multiple lines; Fall Risk  (masimo)   General   Chart Reviewed Yes   Response to Previous Treatment Patient reporting fatigue but able to participate  Family/Caregiver Present Yes  (daughter present in the beginning of session)   Cognition   Arousal/Participation Alert; Cooperative   Attention Attends with cues to redirect   Following Commands Follows one step commands without difficulty   Comments pleasant & cooperative   Subjective   Subjective Pt agreeable to PT/OT interventions  Bed Mobility   Supine to Sit 5  Supervision   Additional items HOB elevated; Bedrails; Increased time required;Verbal cues   Additional Comments cues for techniques & safety   Transfers   Sit to Stand 3  Moderate assistance   Additional items Assist x 1; Increased time required;Verbal cues   Stand to Sit 3  Moderate assistance   Additional items Assist x 1;Verbal cues; Increased time required;Armrests Additional Comments cues for techniques & safety especially hand placement   Ambulation/Elevation   Gait pattern Forward Flexion; Wide CLAUDIA; Decreased foot clearance;Shuffling; Short stride; Ataxia; Step to;Excessively slow;Decreased hip extension;Decreased heel strike;Decreased toe off   Gait Assistance 3  Moderate assist   Additional items Assist x 1;Verbal cues; Tactile cues   Assistive Device Rolling walker   Distance 5'x1   Ambulation/Elevation Additional Comments unsteady gait but no gross LOB noted; dec amb tolerance 2* to weakness & impaired balance   Balance   Static Sitting Fair +   Dynamic Sitting Fair   Static Standing Poor +   Dynamic Standing Poor   Ambulatory Poor   Endurance Deficit   Endurance Deficit Yes   Endurance Deficit Description weakness; fatigue   Activity Tolerance   Activity Tolerance Patient limited by fatigue;Treatment limited secondary to medical complications (Comment)   Medical Staff Made Aware Elmer Burger   Nurse Made Aware RN [facility]   Exercises   Hip Flexion Sitting;10 reps;AROM; Bilateral   Hip Abduction Sitting;10 reps;AROM; Bilateral   Hip Adduction Sitting;10 reps;AROM; Bilateral   Knee AROM Long Arc Quad Sitting;10 reps;AROM; Bilateral   Ankle Pumps Sitting;10 reps;AROM; Bilateral   Assessment   Prognosis Good   Problem List Decreased strength;Decreased endurance; Impaired balance;Decreased mobility; Decreased range of motion; Impaired judgement   Assessment Pt seen for PT per POC  Improved mobility & activity tolerance noted this tx session  Pt require S for bed mobility however continue to require modAx1 for transfers & amb w/ RW + cues for techniques & safety  Gait deviations as above, slow & unsteady w/ WBOS & ataxic gait but no gross LOB noted  Dec amb tolerance 2* to weakness & impaired balance  Pt tolerated above mentioned thera  ex well  Pt require cues for proper exercise techniques and performance   No SOB reported however noted that pt desating at RA w/ SpO2 85-89% hence given 1L O2 NC w/ improved SpO2 to 91-92%  Also noted pt w/ elevated BP, 180/103 at rest & 183/104 after activity  RN made aware  Please see flowsheet above for objective findings & levels of assistance required for all functional tasks during this tx session  Nsg staff most recent vital signs as follows: BP (!) 183/104   Pulse (!) 112   Temp 98 5 °F (36 9 °C) (Oral)   Resp 18   Ht 4' 9" (1 448 m)   Wt 64 9 kg (143 lb)   SpO2 91%   BMI 30 94 kg/m²   Will continue PT per POC  At end of session, pt OOB in chair in stable condition, call bell & phone in reach, chair alarm activated, all lines intact  Fall precautions reinforced w/ good understanding  The patient's AM-PAC Basic Mobility Inpatient Short Form Raw Score is 13  A Raw score of less than or equal to 16 suggests the patient may benefit from discharge to post-acute rehabilitation services  Please also refer to the recommendation of the Physical Therapist for safe discharge planning  From PT standpoint, will continue to recommend inpt rehab at D/C   to follow  Nsg staff to continue to mobilized pt (OOB in chair for all meals & ambulate in room/unit) as tolerated to prevent decline in function  Nsg notified  Co-tx was necessary to complete this PT tx session for the pt's best interest given pt's medical acuity & complexity  Goals   Patient Goals to get better   STG Expiration Date 03/05/23   PT Treatment Day 1   Plan   Treatment/Interventions Functional transfer training;LE strengthening/ROM; Therapeutic exercise; Endurance training;Patient/family training;Bed mobility;Gait training;Spoke to nursing;OT;Family   Progress Slow progress, decreased activity tolerance   PT Frequency 3-5x/wk   Recommendation   PT Discharge Recommendation Post acute rehabilitation services   Equipment Recommended Walker  (pt has)   AM-PAC Basic Mobility Inpatient   Turning in Flat Bed Without Bedrails 3   Lying on Back to Sitting on Edge of Flat Bed Without Bedrails 3   Moving Bed to Chair 2   Standing Up From Chair Using Arms 2   Walk in Room 2   Climb 3-5 Stairs With Railing 1   Basic Mobility Inpatient Raw Score 13   Basic Mobility Standardized Score 33 99   Highest Level Of Mobility   -[facility] Goal 4: Move to chair/commode   -HL Achieved 5: Stand (1 or more minutes)   Education   Education Provided Mobility training;Home exercise program;Assistive device   Patient Demonstrates acceptance/verbal understanding;Reinforcement needed   End of Consult   Patient Position at End of Consult Bedside chair;Bed/Chair alarm activated; All needs within reach   End of Consult Comments Pt in stable condition  All needs in reach  All lines intact, Chair alarm activated     Feliberto Zamora

## 2023-02-21 NOTE — PROGRESS NOTES
[address] [place] [address]  Progress Note - Bernette Camera 1/11/1933, 80 y o  female MRN: [id number]  [id number] Encounter: [id number]  Primary Care Provider: Alice Salazar MD   Date and time admitted to hospital: 2/17/2023 11:09 AM    * Sepsis [facility])  Assessment & Plan  This is a 80-year-old female with history of diabetes mellitus, hypertension, hypothyroidism, neuropathy, basal cell carcinoma presented with 3-day history of nonbloody diarrhea      · Met sepsis criteria with leukocytosis and tachycardia   · Suspected due to colitis   · Markedly elevated procalcitonin, now with significant improvement, continue to monitor  · Blood cultures with no growth to date   · Continue Rocephin and Flagyl D6      Lower extremity edema  Assessment & Plan  · Edema present for several months, since initiation of Actos which has been discontinued  · Venous duplex negative for DVT  · BNP elevated  · Chest x-ray revealed vascular congestion with small bilateral pleural effusions  · Received furosemide 20 mg IV x2 doses 2/20  · 2D echo pending    EILEEN (acute kidney injury) ([facility] [address] )  Assessment & Plan  · Likely prerenal secondary to GI losses and poor p o  intake  · Nephrology input appreciated  · Patient received furosemide 20 mg IV for volume overload  · Creatinine at baseline, will monitor  · Zee catheter was placed to monitor urine output and for urinary retention      Pneumonia  Assessment & Plan  · CT abdomen pelvis showing right lower lobe infiltrate with bilateral small effusions  · No clinical picture of pneumonia  · Already on Abx for colitis     Stercoral colitis  Assessment & Plan  · Patient presented with non-bloody diarrhea associated with poor p o  intake  · CT abdomen pelvis showing stool impaction in the rectum with changes of stercoral colitis, stool throughout colon  · Had a small bowel movement last night  · Continue Rocephin and Flagyl  · Bowel regiment with senna, Colace and MiraLAX  · Pain control as needed, avoid opioids  · C  Difficile and stool PCR negative     Hypothyroid  Assessment & Plan  · Uncontrolled, elevated TSH  · Patient's PCP recently increase levothyroxine from 75 to 88 mcg at the end of January- at that time TSH was 18, now 24 - we increased levothyroxine to 100 mcg daily  · Will need to repeat TSH in 3 to 4 weeks      Hypertension  Assessment & Plan  · continue on nifedipine 60 mg daily  · Lisinopril and oral furosemide on hold    Type 2 diabetes mellitus with diabetic neuropathy, without long-term current use of insulin ([facility])  Assessment & Plan  Lab Results   Component Value Date    HGBA1C 8 7 (H) 2023       Recent Labs     23  0143   POCGLU 86       Blood Sugar Average: Last 72 hrs:  · (P) 86   · Patient maintained on glyburide outpatient   · Bad experience with insulin in the past and refuses any at this time          VTE Pharmacologic Prophylaxis:   Pharmacologic: Heparin    Patient Centered Rounds: I have performed bedside rounds with nursing staff today  Education and Discussions with Family / Patient: Updated daughter Ruthann Calvillo    Time Spent for Care: More than 50% of total time spent on counseling and coordination of care as described above      Current Length of Stay: 4 day(s)    Current Patient Status: Inpatient   Certification Statement: The patient will continue to require additional inpatient hospital stay due to IV antibiotics    Discharge Plan / Estimated Discharge Date: TBD    Code Status: Level 3 - DNAR and DNI      Subjective:   Patient seen and examined at bedside, currently denies any abdominal pain, nausea, vomiting, did have a small bowel movement last night    Objective:     Vitals:   Temp (24hrs), Av 1 °F (36 7 °C), Min:97 6 °F (36 4 °C), Max:98 5 °F (36 9 °C)    Temp:  [97 6 °F (36 4 °C)-98 5 °F (36 9 °C)] 98 5 °F (36 9 °C)  HR:  [] 103  Resp:  [16-20] 18  BP: (157-171)/(90-93) 157/91  SpO2:  [87 %-100 %] 87 %  Body mass index is 30 94 kg/m²  Input and Output Summary (last 24 hours): Intake/Output Summary (Last 24 hours) at 2/21/2023 1358  Last data filed at 2/21/2023 4489  Gross per 24 hour   Intake 470 ml   Output 3650 ml   Net -3180 ml       Physical Exam:    Constitutional: Patient is oriented to person, place and time, no acute distress  HEENT:  Normocephalic, atraumatic  Cardiovascular: Normal S1S2, RRR, No murmurs/rubs/gallops appreciated  Pulmonary:  Bilateral air entry, No rhonchi/rales/wheezing appreciated  Abdominal: Soft, Bowel sounds present, Non-tender, Non-distended  Extremities:  Mild lower extremity edema  Neurological: Cranial nerves II-XII grossly intact, sensation intact, otherwise no focal neurological symptoms  Skin:  Warm, dry    Additional Data:     Labs:    Results from last 7 days   Lab Units 02/21/23  0506   WBC Thousand/uL 8 15   HEMOGLOBIN g/dL 10 8*   HEMATOCRIT % 34 4*   PLATELETS Thousands/uL 219   NEUTROS PCT % 71   LYMPHS PCT % 20   MONOS PCT % 8   EOS PCT % 0     Results from last 7 days   Lab Units 02/21/23  0506   POTASSIUM mmol/L 4 4   CHLORIDE mmol/L 109*   CO2 mmol/L 19*   BUN mg/dL 41*   CREATININE mg/dL 1 47*   CALCIUM mg/dL 8 1*   ALK PHOS U/L 54   ALT U/L 9   AST U/L 10*     Results from last 7 days   Lab Units 02/17/23  1444   INR  1 34*        I Have Reviewed All Lab Data Listed Above  Invasive Devices     Peripheral Intravenous Line  Duration           Peripheral IV 02/17/23 Left Antecubital 4 days    Peripheral IV 02/18/23 Right;Ventral (anterior) Forearm 2 days          Drain  Duration           Urethral Catheter Non-latex 16 Fr  1 day                 Recent Cultures (last 7 days):     Results from last 7 days   Lab Units 02/19/23  1147 02/17/23  1451 02/17/23  1444   BLOOD CULTURE   --  No Growth at 72 hrs  No Growth at 72 hrs     C DIFF TOXIN B BY PCR  Negative  --   --        Last 24 Hours Medication List:   Current Facility-Administered Medications   Medication Dose Route Frequency Provider Last Rate   • acetaminophen  650 mg Oral Q6H PRN Sury Carvalho PA-C     • cefTRIAXone  1,000 mg Intravenous Q24H Sury Carvalho PA-C Stopped (02/20/23 1711)   • diazepam  2 mg Oral Q8H PRN Sury Carvalho PA-C     • docusate sodium  100 mg Oral BID Sury Carvalho PA-C     • heparin (porcine)  5,000 Units Subcutaneous [facility] Estefany Berman PA-C     • levothyroxine  100 mcg Oral Early Morning Clarice Priest MD     • metroNIDAZOLE  500 mg Oral [facility] Evangelina Calloway MD     • ondansetron  4 mg Intravenous Q6H PRN Sury Carvalho PA-C     • polyethylene glycol  17 g Oral Daily Sury Carvalho PA-C     • pravastatin  40 mg Oral Daily With [employer] Cooper Berman PA-C     • senna  1 tablet Oral Daily Estefany Berman PA-C     • sodium bicarbonate  650 mg Oral BID after meals Dee Hernandez DO          Today, Patient Was Seen By: Evangelina Calloway MD

## 2023-02-21 NOTE — PLAN OF CARE
Problem: OCCUPATIONAL THERAPY ADULT  Goal: Performs self-care activities at highest level of function for planned discharge setting  See evaluation for individualized goals  Description: Treatment Interventions: ADL retraining, Functional transfer training, UE strengthening/ROM, Endurance training, Patient/family training, Equipment evaluation/education, Compensatory technique education, Continued evaluation, Energy conservation, Activityengagement          See flowsheet documentation for full assessment, interventions and recommendations  Outcome: Progressing  Note: Limitation: Decreased ADL status, Decreased UE strength, Decreased Safe judgement during ADL, Decreased endurance, Decreased self-care trans, Decreased high-level ADLs  Prognosis: Good  Assessment: Pt seen for f/u OT tx with focus on bed mobility, functional transfers/mobility, self-care tasks, and therapeutic exercises  Pt able to transition supine>sit with SBA  EOB BP: 180/103, pt denies SOB/chest pain/lightheadedness  Sit>stand from EOB with modAx1, functional mobility with modAx1 - unsteadiness noted, stand>sit on recliner with modA for controlled descent; requires VCs for safe transfer technique  Completed therex in order to increase B UE strength for completion of ADLs/functional mobility  Pt saturating 86-90% on RA with activity, donned 1 L O2 with pt saturating between 91-92%; BP end of session - 183/104; -118 BPM  RN Faye Hammans notified of tx outcome and noted increased need for O2 donned  Pt left seated  In chair, alarm armed, needs met and call bell close       OT Discharge Recommendation: Post acute rehabilitation services

## 2023-02-21 NOTE — PLAN OF CARE
Problem: Potential for Falls  Goal: Patient will remain free of falls  Description: INTERVENTIONS:  - Educate patient/family on patient safety including physical limitations  - Instruct patient to call for assistance with activity   - Consult OT/PT to assist with strengthening/mobility   - Keep Call bell within reach  - Keep bed low and locked with side rails adjusted as appropriate  - Keep care items and personal belongings within reach  - Initiate and maintain comfort rounds  - Make Fall Risk Sign visible to staff  - Offer Toileting every 2 Hours, in advance of need  - Initiate/Maintain bed alarm  - Obtain necessary fall risk management equipment: bed rails, alarm  - Apply yellow socks and bracelet for high fall risk patients  - Consider moving patient to room near nurses station  Outcome: Progressing     Problem: MOBILITY - ADULT  Goal: Maintain or return to baseline ADL function  Description: INTERVENTIONS:  -  Assess patient's ability to carry out ADLs; assess patient's baseline for ADL function and identify physical deficits which impact ability to perform ADLs (bathing, care of mouth/teeth, toileting, grooming, dressing, etc )  - Assess/evaluate cause of self-care deficits   - Assess range of motion  - Assess patient's mobility; develop plan if impaired  - Assess patient's need for assistive devices and provide as appropriate  - Encourage maximum independence but intervene and supervise when necessary  - Involve family in performance of ADLs  - Assess for home care needs following discharge   - Consider OT consult to assist with ADL evaluation and planning for discharge  - Provide patient education as appropriate  Outcome: Progressing  Goal: Maintains/Returns to pre admission functional level  Description: INTERVENTIONS:  - Perform BMAT or MOVE assessment daily    - Set and communicate daily mobility goal to care team and patient/family/caregiver     - Collaborate with rehabilitation services on mobility goals if consulted  - Reposition patient every 2 hours  - Dangle patient 3 times a day  - Stand patient 2 times a day  - Ambulate patient 2 times a day  - Out of bed to chair 2 times a day   - Out of bed for meals 2 times a day  - Out of bed for toileting  - Record patient progress and toleration of activity level   Outcome: Progressing     Problem: GASTROINTESTINAL - ADULT  Goal: Maintains or returns to baseline bowel function  Description: INTERVENTIONS:  - Assess bowel function  - Encourage oral fluids to ensure adequate hydration  - Administer IV fluids if ordered to ensure adequate hydration  - Administer ordered medications as needed  - Encourage mobilization and activity  - Consider nutritional services referral to assist patient with adequate nutrition and appropriate food choices  Outcome: Progressing     Problem: Knowledge Deficit  Goal: Patient/family/caregiver demonstrates understanding of disease process, treatment plan, medications, and discharge instructions  Description: Complete learning assessment and assess knowledge base    Interventions:  - Provide teaching at level of understanding  - Provide teaching via preferred learning methods  Outcome: Progressing     Problem: Prexisting or High Potential for Compromised Skin Integrity  Goal: Skin integrity is maintained or improved  Description: INTERVENTIONS:  - Identify patients at risk for skin breakdown  - Assess and monitor skin integrity  - Assess and monitor nutrition and hydration status  - Monitor labs   - Assess for incontinence   - Turn and reposition patient  - Assist with mobility/ambulation  - Relieve pressure over bony prominences  - Avoid friction and shearing  - Provide appropriate hygiene as needed including keeping skin clean and dry  - Evaluate need for skin moisturizer/barrier cream  - Collaborate with interdisciplinary team   - Patient/family teaching  - Consider wound care consult   Outcome: Progressing     Problem: INFECTION - ADULT  Goal: Absence or prevention of progression during hospitalization  Description: INTERVENTIONS:  - Assess and monitor for signs and symptoms of infection  - Monitor lab/diagnostic results  - Monitor all insertion sites, i e  indwelling lines, tubes, and drains  - Monitor endotracheal if appropriate and nasal secretions for changes in amount and color  - [place] appropriate cooling/warming therapies per order  - Administer medications as ordered  - Instruct and encourage patient and family to use good hand hygiene technique  - Identify and instruct in appropriate isolation precautions for identified infection/condition  Outcome: Progressing     Problem: SAFETY ADULT  Goal: Patient will remain free of falls  Description: INTERVENTIONS:  - Educate patient/family on patient safety including physical limitations  - Instruct patient to call for assistance with activity   - Consult OT/PT to assist with strengthening/mobility   - Keep Call bell within reach  - Keep bed low and locked with side rails adjusted as appropriate  - Keep care items and personal belongings within reach  - Initiate and maintain comfort rounds  - Make Fall Risk Sign visible to staff  - Offer Toileting every 2 Hours, in advance of need  - Initiate/Maintain bed/chair alarm  - Obtain necessary fall risk management equipment: bed rails, alarms  - Apply yellow socks and bracelet for high fall risk patients  - Consider moving patient to room near nurses station  Outcome: Progressing  Goal: Maintain or return to baseline ADL function  Description: INTERVENTIONS:  -  Assess patient's ability to carry out ADLs; assess patient's baseline for ADL function and identify physical deficits which impact ability to perform ADLs (bathing, care of mouth/teeth, toileting, grooming, dressing, etc )  - Assess/evaluate cause of self-care deficits   - Assess range of motion  - Assess patient's mobility; develop plan if impaired  - Assess patient's need for assistive devices and provide as appropriate  - Encourage maximum independence but intervene and supervise when necessary  - Involve family in performance of ADLs  - Assess for home care needs following discharge   - Consider OT consult to assist with ADL evaluation and planning for discharge  - Provide patient education as appropriate  Outcome: Progressing  Goal: Maintains/Returns to pre admission functional level  Description: INTERVENTIONS:  - Perform BMAT or MOVE assessment daily    - Set and communicate daily mobility goal to care team and patient/family/caregiver  - Collaborate with rehabilitation services on mobility goals if consulted  - Reposition patient every 2 hours    - Dangle patient 3 times a day  - Stand patient 2 times a day  - Ambulate patient 2 times a day  - Out of bed to chair 2 times a day   - Out of bed for meals 2 times a day  - Out of bed for toileting  - Record patient progress and toleration of activity level   Outcome: Progressing     Problem: DISCHARGE PLANNING  Goal: Discharge to home or other facility with appropriate resources  Description: INTERVENTIONS:  - Identify barriers to discharge w/patient and caregiver  - Arrange for needed discharge resources and transportation as appropriate  - Identify discharge learning needs (meds, wound care, etc )  - Arrange for interpretive services to assist at discharge as needed  - Refer to Case Management Department for coordinating discharge planning if the patient needs post-hospital services based on physician/advanced practitioner order or complex needs related to functional status, cognitive ability, or social support system  Outcome: Progressing     Problem: METABOLIC, FLUID AND ELECTROLYTES - ADULT  Goal: Fluid balance maintained  Description: INTERVENTIONS:  - Monitor labs   - Monitor I/O and WT  - Instruct patient on fluid and nutrition as appropriate  - Assess for signs & symptoms of volume excess or deficit  Outcome: Progressing     Problem: SKIN/TISSUE INTEGRITY - ADULT  Goal: Skin Integrity remains intact(Skin Breakdown Prevention)  Description: Assess:  -Perform [place] assessment every shift  -Clean and moisturize skin as needed  -Inspect skin when repositioning, toileting, and assisting with ADLS  -Assess under medical devices such as masimo every 4 hrs  -Assess extremities for adequate circulation and sensation     Bed Management:  -Have minimal linens on bed & keep smooth, unwrinkled  -Change linens as needed when moist or perspiring  -Avoid sitting or lying in one position for more than 2 hours while in bed  -Keep HOB at >30 degrees     Toileting:  -Offer bedside commode  -Assess for incontinence every 2 hrs  -Use incontinent care products after each incontinent episode such as wet bath wipes    Activity:  -Mobilize patient 2 times a day  -Encourage activity and walks on unit  -Encourage or provide ROM exercises   -Turn and reposition patient every 2 Hours  -Use appropriate equipment to lift or move patient in bed  -Instruct/ Assist with weight shifting every 15 min when out of bed in chair  -Consider limitation of chair time to 2 hour intervals    Skin Care:  -Avoid use of baby powder, tape, friction and shearing, hot water or constrictive clothing  -Relieve pressure over bony prominences using allevyn foams  -Do not massage red bony areas    Next Steps:  -Teach patient strategies to minimize risks    -Consider consults to  interdisciplinary teams   Outcome: Progressing

## 2023-02-21 NOTE — OCCUPATIONAL THERAPY NOTE
Occupational Therapy Progress Note     Patient Name: Refugio Sales  YZCRC'U Date: 2/21/2023  Problem List  Principal Problem:    Sepsis [facility])  Active Problems:    Type 2 diabetes mellitus with diabetic neuropathy, without long-term current use of insulin (HCC)    Hypertension    Hypothyroid    Stercoral colitis    Pneumonia    EILEEN (acute kidney injury) ([facility] [address] )    Lower extremity edema    Hypocalcemia        02/21/23 1425   OT Last Visit   OT Visit Date 02/21/23   Note Type   Note Type Treatment   Pain Assessment   Pain Assessment Tool 0-10   Pain Score No Pain   Restrictions/Precautions   Weight Bearing Precautions Per Order No   Other Precautions Chair Alarm; Bed Alarm;Multiple lines; Fall Risk  (eddie)   ADL   Toileting Assistance  2  Maximal Assistance   Toileting Deficit Steadying;Verbal cueing;Supervison/safety; Increased time to complete;Perineal hygiene;Clothing management up;Clothing management down   Bed Mobility   Supine to Sit 5  Supervision   Additional items HOB elevated; Bedrails; Increased time required;Verbal cues   Additional Comments cues for safe technique   Transfers   Sit to Stand 3  Moderate assistance   Additional items Assist x 1; Increased time required;Verbal cues   Stand to Sit 3  Moderate assistance   Additional items Assist x 1; Increased time required;Verbal cues   Functional Mobility   Functional Mobility 3  Moderate assistance   Additional Comments Ax1, RW   Therapeutic Excerise-Strength   UE Strength Yes   Right Upper Extremity- Strength   R Shoulder Flexion; Extension;ABduction;Horizontal ABduction   R Elbow Elbow flexion;Elbow extension   R Weight/Reps/Sets 10x1 reps   Left Upper Extremity-Strength   L Shoulder Flexion; Extension;ABduction;Horizontal ABduction   L Elbow Elbow flexion;Elbow extension   L Weights/Reps/Sets 10x1 reps   Subjective   Subjective "I guess I'll try"   Cognition   Overall Cognitive Status WFL   Arousal/Participation Alert; Responsive; Cooperative   Attention Attends with cues to redirect   Orientation Level Oriented X4   Memory Decreased recall of precautions   Following Commands Follows one step commands without difficulty   Activity Tolerance   Activity Tolerance Patient limited by fatigue;Treatment limited secondary to medical complications (Comment)   Medical Staff Made Aware Alen PT, Yolanda Hough RN   Assessment   Assessment Pt seen for f/u OT tx with focus on bed mobility, functional transfers/mobility, self-care tasks, and therapeutic exercises  Pt able to transition supine>sit with SBA  EOB BP: 180/103, pt denies SOB/chest pain/lightheadedness  Sit>stand from EOB with modAx1, functional mobility with modAx1 - unsteadiness noted, stand>sit on recliner with modA for controlled descent; requires VCs for safe transfer technique  Completed therex in order to increase B UE strength for completion of ADLs/functional mobility  Pt saturating 86-90% on RA with activity, donned 1 L O2 with pt saturating between 91-92%; BP end of session - 183/104  ZAKI Hough notified of tx outcome and noted increased need for O2 donned  Pt left seated  In chair, alarm armed, needs met and call bell close  Plan   Treatment Interventions ADL retraining;Functional transfer training;UE strengthening/ROM; Endurance training;Patient/family training;Equipment evaluation/education; Compensatory technique education;Continued evaluation; Energy conservation; Activityengagement   Goal Expiration Date 03/06/23   OT Treatment Day 1   OT Frequency 3-5x/wk   Recommendation   OT Discharge Recommendation Post acute rehabilitation services   AM-PAC Daily Activity Inpatient   Lower Body Dressing 2   Bathing 2   Toileting 2   Upper Body Dressing 3   Grooming 3   Eating 4   Daily Activity Raw Score 16   Daily Activity Standardized Score (Calc for Raw Score >=11) 35 96   AM-PAC Applied Cognition Inpatient   Following a Speech/Presentation 4   Understanding Ordinary Conversation 4   Taking Medications 3   Remembering Where Things Are Placed or Put Away 3   Remembering List of 4-5 Errands 3   Taking Care of Complicated Tasks 3   Applied Cognition Raw Score 20   Applied Cognition Standardized Score 41 76     Yogi Grew

## 2023-02-21 NOTE — PROGRESS NOTES
The metronidazole has been converted to Oral per [facility] IV-to-PO Auto-Conversion Protocol for Adults as approved by the Pharmacy and Therapeutics Committee  The patient met all eligible criteria:  3 Age = 25years old   2) Received at least one dose of the IV form   3) Receiving at least one other scheduled oral/enteral medication   4) Tolerating an oral/enteral diet   and did not have any exclusions:   1) Critical care patient   2) Active GI bleed (IF assessing H2RAs or PPIs)   3) Continuous tube feeding (IF assessing cipro, doxycycline, levofloxacin, minocycline, rifampin, or voriconazole)   4) Receiving PO vancomycin (IF assessing metronidazole)   5) Persistent nausea and/or vomiting   6) Ileus or gastrointestinal obstruction   7) Dmitriy/nasogastric tube set for continuous suction   8) Specific order not to automatically convert to PO (in the order's comments or if discussed in the most recent Infectious Disease or primary team's progress notes)

## 2023-02-21 NOTE — ASSESSMENT & PLAN NOTE
· Patient presented with non-bloody diarrhea associated with poor p o  intake  · CT abdomen pelvis showing stool impaction in the rectum with changes of stercoral colitis, stool throughout colon  · Had a small bowel movement last night  · Continue Rocephin and Flagyl  · Bowel regiment with senna, Colace and MiraLAX  · Pain control as needed, avoid opioids  · C   Difficile and stool PCR negative

## 2023-02-21 NOTE — ASSESSMENT & PLAN NOTE
· Edema present for several months, since initiation of Actos which has been discontinued  · Venous duplex negative for DVT  · BNP elevated  · Chest x-ray revealed vascular congestion with small bilateral pleural effusions  · Received furosemide 20 mg IV x2 doses 2/20  · 2D echo pending

## 2023-02-21 NOTE — ASSESSMENT & PLAN NOTE
· Likely prerenal secondary to GI losses and poor p o  intake  · Nephrology input appreciated  · Patient received furosemide 20 mg IV for volume overload  · Creatinine at baseline, will monitor  · Zee catheter was placed to monitor urine output and for urinary retention

## 2023-02-21 NOTE — ASSESSMENT & PLAN NOTE
Lab Results   Component Value Date    HGBA1C 8 7 (H) 01/26/2023       Recent Labs     02/21/23  0143   POCGLU 86       Blood Sugar Average: Last 72 hrs:  · (P) 86   · Patient maintained on glyburide outpatient   · Bad experience with insulin in the past and refuses any at this time

## 2023-02-22 PROBLEM — I50.9 ACUTE CHF (CONGESTIVE HEART FAILURE) (HCC): Status: ACTIVE | Noted: 2023-02-17

## 2023-02-22 LAB
ALBUMIN SERPL ELPH-MCNC: 2.18 G/DL (ref 3.5–5)
ALBUMIN SERPL ELPH-MCNC: 51.8 % (ref 52–65)
ALPHA1 GLOB SERPL ELPH-MCNC: 0.45 G/DL (ref 0.1–0.4)
ALPHA1 GLOB SERPL ELPH-MCNC: 10.8 % (ref 2.5–5)
ALPHA2 GLOB SERPL ELPH-MCNC: 0.6 G/DL (ref 0.4–1.2)
ALPHA2 GLOB SERPL ELPH-MCNC: 14.2 % (ref 7–13)
ANION GAP SERPL CALCULATED.3IONS-SCNC: 10 MMOL/L (ref 4–13)
BACTERIA BLD CULT: NORMAL
BACTERIA BLD CULT: NORMAL
BASOPHILS # BLD AUTO: 0.05 THOUSANDS/ÂΜL (ref 0–0.1)
BASOPHILS NFR BLD AUTO: 1 % (ref 0–1)
BETA GLOB ABNORMAL SERPL ELPH-MCNC: 0.26 G/DL (ref 0.4–0.8)
BETA1 GLOB SERPL ELPH-MCNC: 6.1 % (ref 5–13)
BETA2 GLOB SERPL ELPH-MCNC: 4.4 % (ref 2–8)
BETA2+GAMMA GLOB SERPL ELPH-MCNC: 0.18 G/DL (ref 0.2–0.5)
BUN SERPL-MCNC: 34 MG/DL (ref 5–25)
CALCIUM SERPL-MCNC: 8.1 MG/DL (ref 8.4–10.2)
CHLORIDE SERPL-SCNC: 108 MMOL/L (ref 96–108)
CO2 SERPL-SCNC: 21 MMOL/L (ref 21–32)
CREAT SERPL-MCNC: 1.4 MG/DL (ref 0.6–1.3)
EOSINOPHIL # BLD AUTO: 0.03 THOUSAND/ÂΜL (ref 0–0.61)
EOSINOPHIL NFR BLD AUTO: 0 % (ref 0–6)
ERYTHROCYTE [DISTWIDTH] IN BLOOD BY AUTOMATED COUNT: 13.2 % (ref 11.6–15.1)
GAMMA GLOB ABNORMAL SERPL ELPH-MCNC: 0.53 G/DL (ref 0.5–1.6)
GAMMA GLOB SERPL ELPH-MCNC: 12.7 % (ref 12–22)
GFR SERPL CREATININE-BSD FRML MDRD: 33 ML/MIN/1.73SQ M
GLUCOSE SERPL-MCNC: 113 MG/DL (ref 65–140)
GLUCOSE SERPL-MCNC: 67 MG/DL (ref 65–140)
HCT VFR BLD AUTO: 36.5 % (ref 34.8–46.1)
HGB BLD-MCNC: 11.3 G/DL (ref 11.5–15.4)
IGG/ALB SER: 1.07 {RATIO} (ref 1.1–1.8)
IMM GRANULOCYTES # BLD AUTO: 0.1 THOUSAND/UL (ref 0–0.2)
IMM GRANULOCYTES NFR BLD AUTO: 1 % (ref 0–2)
LYMPHOCYTES # BLD AUTO: 1.66 THOUSANDS/ÂΜL (ref 0.6–4.47)
LYMPHOCYTES NFR BLD AUTO: 17 % (ref 14–44)
MCH RBC QN AUTO: 29.3 PG (ref 26.8–34.3)
MCHC RBC AUTO-ENTMCNC: 31 G/DL (ref 31.4–37.4)
MCV RBC AUTO: 95 FL (ref 82–98)
MONOCYTES # BLD AUTO: 0.67 THOUSAND/ÂΜL (ref 0.17–1.22)
MONOCYTES NFR BLD AUTO: 7 % (ref 4–12)
NEUTROPHILS # BLD AUTO: 7.25 THOUSANDS/ÂΜL (ref 1.85–7.62)
NEUTS SEG NFR BLD AUTO: 74 % (ref 43–75)
NRBC BLD AUTO-RTO: 0 /100 WBCS
PLATELET # BLD AUTO: 260 THOUSANDS/UL (ref 149–390)
PMV BLD AUTO: 11 FL (ref 8.9–12.7)
POTASSIUM SERPL-SCNC: 4.1 MMOL/L (ref 3.5–5.3)
PROT PATTERN SERPL ELPH-IMP: ABNORMAL
PROT SERPL-MCNC: 4.2 G/DL (ref 6.4–8.2)
RBC # BLD AUTO: 3.86 MILLION/UL (ref 3.81–5.12)
SODIUM SERPL-SCNC: 139 MMOL/L (ref 135–147)
WBC # BLD AUTO: 9.76 THOUSAND/UL (ref 4.31–10.16)

## 2023-02-22 RX ORDER — CARVEDILOL 3.12 MG/1
3.12 TABLET ORAL 2 TIMES DAILY WITH MEALS
Status: DISCONTINUED | OUTPATIENT
Start: 2023-02-22 | End: 2023-02-23

## 2023-02-22 RX ORDER — TORSEMIDE 10 MG/1
10 TABLET ORAL DAILY
Status: DISCONTINUED | OUTPATIENT
Start: 2023-02-22 | End: 2023-02-24 | Stop reason: HOSPADM

## 2023-02-22 RX ADMIN — PRAVASTATIN SODIUM 40 MG: 40 TABLET ORAL at 16:19

## 2023-02-22 RX ADMIN — CARVEDILOL 3.12 MG: 3.12 TABLET, FILM COATED ORAL at 16:19

## 2023-02-22 RX ADMIN — HEPARIN SODIUM 5000 UNITS: 5000 INJECTION INTRAVENOUS; SUBCUTANEOUS at 07:06

## 2023-02-22 RX ADMIN — DOCUSATE SODIUM 100 MG: 100 CAPSULE, LIQUID FILLED ORAL at 08:47

## 2023-02-22 RX ADMIN — HEPARIN SODIUM 5000 UNITS: 5000 INJECTION INTRAVENOUS; SUBCUTANEOUS at 13:00

## 2023-02-22 RX ADMIN — DOCUSATE SODIUM 100 MG: 100 CAPSULE, LIQUID FILLED ORAL at 16:19

## 2023-02-22 RX ADMIN — LEVOTHYROXINE SODIUM 100 MCG: 100 TABLET ORAL at 07:06

## 2023-02-22 RX ADMIN — POLYETHYLENE GLYCOL 3350 17 G: 17 POWDER, FOR SOLUTION ORAL at 08:47

## 2023-02-22 RX ADMIN — TORSEMIDE 10 MG: 10 TABLET ORAL at 13:00

## 2023-02-22 RX ADMIN — CEFTRIAXONE 1000 MG: 1 INJECTION, SOLUTION INTRAVENOUS at 14:07

## 2023-02-22 RX ADMIN — SENNOSIDES 8.6 MG: 8.6 TABLET ORAL at 08:47

## 2023-02-22 RX ADMIN — NIFEDIPINE 30 MG: 30 TABLET, FILM COATED, EXTENDED RELEASE ORAL at 21:14

## 2023-02-22 RX ADMIN — METRONIDAZOLE 500 MG: 500 TABLET ORAL at 21:14

## 2023-02-22 RX ADMIN — METRONIDAZOLE 500 MG: 500 TABLET ORAL at 13:00

## 2023-02-22 RX ADMIN — NIFEDIPINE 30 MG: 30 TABLET, FILM COATED, EXTENDED RELEASE ORAL at 08:47

## 2023-02-22 RX ADMIN — METRONIDAZOLE 500 MG: 500 TABLET ORAL at 07:06

## 2023-02-22 RX ADMIN — HEPARIN SODIUM 5000 UNITS: 5000 INJECTION INTRAVENOUS; SUBCUTANEOUS at 21:15

## 2023-02-22 RX ADMIN — SODIUM BICARBONATE 650 MG TABLET 650 MG: at 08:48

## 2023-02-22 NOTE — PROGRESS NOTES
[facility]  Progress Note - Breonna Estimable 1/11/1933, 80 y o  female MRN: [id number]  Unit/Bed#: [address] [id number] Encounter: [id number]  Primary Care Provider: Julia Griffin MD   Date and time admitted to hospital: 2/17/2023 11:09 AM    * Sepsis [facility])  Assessment & Plan  This is a 30-year-old female with history of diabetes mellitus, hypertension, hypothyroidism, neuropathy, basal cell carcinoma presented with 3-day history of nonbloody diarrhea      · Met sepsis criteria with leukocytosis and tachycardia   · Suspected due to colitis   · Markedly elevated procalcitonin, now with significant improvement, continue to monitor  · Blood cultures with no growth to date   · Continue Rocephin and Flagyl D6/7    Acute CHF (congestive heart failure) ([facility] )  Assessment & Plan  · 2D echo 2/21/2023 reveals ejection fraction 68%, diastolic function abnormal, severe aortic valve stenosis, moderate MR and moderate MS  · Patient had lower extremity edema which she was having for months  · Lower extremity duplex negative for DVT  · BNP elevated  · Chest x-ray revealed vascular congestion with small bilateral pleural effusions  ·  started on torsemide 20 mg daily, carvedilol 3 125 mg twice daily  · ACE inhibitor held for acute kidney injury  · Monitor I&O, daily weight, fluid restriction  · Cardiology input will be appreciated    EILEEN (acute kidney injury) ([facility] )  Assessment & Plan  · Likely prerenal secondary to GI losses and poor p o  intake  · Nephrology input appreciated  · Patient received furosemide 20 mg IV for volume overload 2/20  · Started on torsemide 20 mg daily  · Creatinine at baseline, will monitor  · Zee catheter was placed to monitor urine output and for urinary retention      Pneumonia  Assessment & Plan  · CT abdomen pelvis showing right lower lobe infiltrate with bilateral small effusions  · No clinical picture of pneumonia  · Already on Abx for colitis     Stercoral colitis  Assessment & Plan  · Patient presented with non-bloody diarrhea associated with poor p o  intake  · CT abdomen pelvis showing stool impaction in the rectum with changes of stercoral colitis, stool throughout colon  · Had a small bowel movement last night  · Continue Rocephin and Flagyl  · Bowel regiment with senna, Colace and MiraLAX  · Pain control as needed, avoid opioids  · C  Difficile and stool PCR negative     Hypothyroid  Assessment & Plan  · Uncontrolled, elevated TSH  · Patient's PCP recently increase levothyroxine from 75 to 88 mcg at the end of January- at that time TSH was 18, now 24 - we increased levothyroxine to 100 mcg daily  · Will need to repeat TSH in 3 to 4 weeks    Hypertension  Assessment & Plan  · Antihypertensives were initially held due to hypotension, blood pressure now elevated  · Started on nifedipine 30 mg twice daily torsemide 20 mg daily  · Lisinopril on hold    Type 2 diabetes mellitus with diabetic neuropathy, without long-term current use of insulin ([facility])  Assessment & Plan  Lab Results   Component Value Date    HGBA1C 8 7 (H) 01/26/2023       Recent Labs     02/21/23  0143 02/21/23  1438   POCGLU 86 94       Blood Sugar Average: Last 72 hrs:  · (P) 90   · Patient maintained on glyburide outpatient   · Bad experience with insulin in the past and refuses any at this time          VTE Pharmacologic Prophylaxis:   Pharmacologic: Heparin    Patient Centered Rounds: I have performed bedside rounds with nursing staff today  Discussions with Specialists or Other Care Team Provider: Cardiology, nephrology    Education and Discussions with Family / Patient: Updated patient's daughter at length    Time Spent for Care: More than 50% of total time spent on counseling and coordination of care as described above      Current Length of Stay: 5 day(s)    Current Patient Status: Inpatient   Certification Statement: The patient will continue to require additional inpatient hospital stay due to Acute kidney injury, new cardiomyopathy    Discharge Plan / Estimated Discharge Date: TBD    Code Status: Level 3 - DNAR and DNI      Subjective:   Patient seen and examined at bedside, resting comfortably, denies any abdominal pain, nausea or vomiting  Does report having poor appetite    Objective:     Vitals:   Temp (24hrs), Av 5 °F (36 9 °C), Min:98 °F (36 7 °C), Max:98 8 °F (37 1 °C)    Temp:  [98 °F (36 7 °C)-98 8 °F (37 1 °C)] 98 8 °F (37 1 °C)  HR:  [] 107  Resp:  [18] 18  BP: (159-183)/() 160/85  SpO2:  [85 %-94 %] 89 %  Body mass index is 30 82 kg/m²  Input and Output Summary (last 24 hours): Intake/Output Summary (Last 24 hours) at 2023 1311  Last data filed at 2023 1717  Gross per 24 hour   Intake --   Output 900 ml   Net -900 ml       Physical Exam:    Constitutional: Patient is oriented to person, place and time, no acute distress  HEENT:  Normocephalic, atraumatic  Cardiovascular: Normal S1S2, RRR, No murmurs/rubs/gallops appreciated  Pulmonary:  Bilateral air entry, No rhonchi/rales/wheezing appreciated  Abdominal: Soft, Bowel sounds present, Non-tender, Non-distended  Extremities: Mild lower extremity pitting edema  Neurological: Wake, alert  Skin:  Warm, dry    Additional Data:     Labs:    Results from last 7 days   Lab Units 23  0426   WBC Thousand/uL 9 76   HEMOGLOBIN g/dL 11 3*   HEMATOCRIT % 36 5   PLATELETS Thousands/uL 260   NEUTROS PCT % 74   LYMPHS PCT % 17   MONOS PCT % 7   EOS PCT % 0     Results from last 7 days   Lab Units 23  0426 23  0506   POTASSIUM mmol/L 4 1 4 4   CHLORIDE mmol/L 108 109*   CO2 mmol/L 21 19*   BUN mg/dL 34* 41*   CREATININE mg/dL 1 40* 1 47*   CALCIUM mg/dL 8 1* 8 1*   ALK PHOS U/L  --  54   ALT U/L  --  9   AST U/L  --  10*     Results from last 7 days   Lab Units 23  1444   INR  1 34*        I Have Reviewed All Lab Data Listed Above      Invasive Devices     Peripheral Intravenous Line  Duration Peripheral IV 02/18/23 Right;Ventral (anterior) Forearm 3 days    Peripheral IV 02/22/23 Left;Ventral (anterior) Forearm <1 day          Drain  Duration           Urethral Catheter Non-latex 16 Fr  2 days                     Recent Cultures (last 7 days):     Results from last 7 days   Lab Units 02/19/23  1147 02/17/23  1451 02/17/23  1444   BLOOD CULTURE   --  No Growth After 4 Days  No Growth After 4 Days     C DIFF TOXIN B BY PCR  Negative  --   --        Last 24 Hours Medication List:   Current Facility-Administered Medications   Medication Dose Route Frequency Provider Last Rate   • acetaminophen  650 mg Oral Q6H PRN Dorita Cramp, PA-C     • carvedilol  3 125 mg Oral BID With Meals MICKI Johnson     • cefTRIAXone  1,000 mg Intravenous Q24H Dorita Cramp, PA-C 1,000 mg (02/21/23 1406)   • diazepam  2 mg Oral Q8H PRN Dorita Cramp, PA-C     • docusate sodium  100 mg Oral BID Dorita Cramp, PA-C     • heparin (porcine)  5,000 Units Subcutaneous [facility]H [facility]     • levothyroxine  100 mcg Oral Early Morning Clarice Amanda Daniel MD     • metroNIDAZOLE  500 mg Oral Q8H [address] Jacqueline Fuentes MD     • NIFEdipine  30 mg Oral BID Jacqueline Fuentes MD     • ondansetron  4 mg Intravenous Q6H PRN Dorita Cramp, PA-C     • polyethylene glycol  17 g Oral Daily Dorita Cramp, PA-C     • pravastatin  40 mg Oral Daily With [employer] Salomon Gomez PA-C     • senna  1 tablet Oral Daily Estefany Salomon Gomez PA-C     • torsemide  10 mg Oral Daily Alease Kussmaul, DO          Today, Patient Was Seen By: Jacqueline Fuentes MD

## 2023-02-22 NOTE — ASSESSMENT & PLAN NOTE
· Antihypertensives were initially held due to hypotension, blood pressure now elevated  · Started on nifedipine 30 mg twice daily torsemide 20 mg daily  · Lisinopril on hold

## 2023-02-22 NOTE — ASSESSMENT & PLAN NOTE
This is a 25-year-old female with history of diabetes mellitus, hypertension, hypothyroidism, neuropathy, basal cell carcinoma presented with 3-day history of nonbloody diarrhea      · Met sepsis criteria with leukocytosis and tachycardia   · Suspected due to colitis   · Markedly elevated procalcitonin, now with significant improvement, continue to monitor  · Blood cultures with no growth to date   · Continue Rocephin and Flagyl D6/7

## 2023-02-22 NOTE — PLAN OF CARE
Problem: Potential for Falls  Goal: Patient will remain free of falls  Description: INTERVENTIONS:  - Educate patient/family on patient safety including physical limitations  - Instruct patient to call for assistance with activity   - Consult OT/PT to assist with strengthening/mobility   - Keep Call bell within reach  - Keep bed low and locked with side rails adjusted as appropriate  - Keep care items and personal belongings within reach  - Initiate and maintain comfort rounds  - Make Fall Risk Sign visible to staff  - Offer Toileting every  Hours, in advance of need  - Initiate/Maintain alarm  - Obtain necessary fall risk management equipment:   - Apply yellow socks and bracelet for high fall risk patients  - Consider moving patient to room near nurses station  Outcome: Progressing     Problem: MOBILITY - ADULT  Goal: Maintain or return to baseline ADL function  Description: INTERVENTIONS:  -  Assess patient's ability to carry out ADLs; assess patient's baseline for ADL function and identify physical deficits which impact ability to perform ADLs (bathing, care of mouth/teeth, toileting, grooming, dressing, etc )  - Assess/evaluate cause of self-care deficits   - Assess range of motion  - Assess patient's mobility; develop plan if impaired  - Assess patient's need for assistive devices and provide as appropriate  - Encourage maximum independence but intervene and supervise when necessary  - Involve family in performance of ADLs  - Assess for home care needs following discharge   - Consider OT consult to assist with ADL evaluation and planning for discharge  - Provide patient education as appropriate  Outcome: Progressing  Goal: Maintains/Returns to pre admission functional level  Description: INTERVENTIONS:  - Perform BMAT or MOVE assessment daily    - Set and communicate daily mobility goal to care team and patient/family/caregiver     - Collaborate with rehabilitation services on mobility goals if consulted  - Perform Range of Motion  times a day  - Reposition patient every  hours  - Dangle patient  times a day  - Stand patient  times a day  - Ambulate patient  times a day  - Out of bed to chair  times a day   - Out of bed for meals  times a day  - Out of bed for toileting  - Record patient progress and toleration of activity level   Outcome: Progressing     Problem: GASTROINTESTINAL - ADULT  Goal: Maintains or returns to baseline bowel function  Description: INTERVENTIONS:  - Assess bowel function  - Encourage oral fluids to ensure adequate hydration  - Administer IV fluids if ordered to ensure adequate hydration  - Administer ordered medications as needed  - Encourage mobilization and activity  - Consider nutritional services referral to assist patient with adequate nutrition and appropriate food choices  Outcome: Progressing     Problem: Knowledge Deficit  Goal: Patient/family/caregiver demonstrates understanding of disease process, treatment plan, medications, and discharge instructions  Description: Complete learning assessment and assess knowledge base    Interventions:  - Provide teaching at level of understanding  - Provide teaching via preferred learning methods  Outcome: Progressing     Problem: Prexisting or High Potential for Compromised Skin Integrity  Goal: Skin integrity is maintained or improved  Description: INTERVENTIONS:  - Identify patients at risk for skin breakdown  - Assess and monitor skin integrity  - Assess and monitor nutrition and hydration status  - Monitor labs   - Assess for incontinence   - Turn and reposition patient  - Assist with mobility/ambulation  - Relieve pressure over bony prominences  - Avoid friction and shearing  - Provide appropriate hygiene as needed including keeping skin clean and dry  - Evaluate need for skin moisturizer/barrier cream  - Collaborate with interdisciplinary team   - Patient/family teaching  - Consider wound care consult   Outcome: Progressing     Problem: INFECTION - ADULT  Goal: Absence or prevention of progression during hospitalization  Description: INTERVENTIONS:  - Assess and monitor for signs and symptoms of infection  - Monitor lab/diagnostic results  - Monitor all insertion sites, i e  indwelling lines, tubes, and drains  - Monitor endotracheal if appropriate and nasal secretions for changes in amount and color  - [place] appropriate cooling/warming therapies per order  - Administer medications as ordered  - Instruct and encourage patient and family to use good hand hygiene technique  - Identify and instruct in appropriate isolation precautions for identified infection/condition  Outcome: Progressing     Problem: SAFETY ADULT  Goal: Patient will remain free of falls  Description: INTERVENTIONS:  - Educate patient/family on patient safety including physical limitations  - Instruct patient to call for assistance with activity   - Consult OT/PT to assist with strengthening/mobility   - Keep Call bell within reach  - Keep bed low and locked with side rails adjusted as appropriate  - Keep care items and personal belongings within reach  - Initiate and maintain comfort rounds  - Make Fall Risk Sign visible to staff  - Offer Toileting every  Hours, in advance of need  - Initiate/Maintain alarm  - Obtain necessary fall risk management equipment:   - Apply yellow socks and bracelet for high fall risk patients  - Consider moving patient to room near nurses station  Outcome: Progressing  Goal: Maintain or return to baseline ADL function  Description: INTERVENTIONS:  -  Assess patient's ability to carry out ADLs; assess patient's baseline for ADL function and identify physical deficits which impact ability to perform ADLs (bathing, care of mouth/teeth, toileting, grooming, dressing, etc )  - Assess/evaluate cause of self-care deficits   - Assess range of motion  - Assess patient's mobility; develop plan if impaired  - Assess patient's need for assistive devices and provide as appropriate  - Encourage maximum independence but intervene and supervise when necessary  - Involve family in performance of ADLs  - Assess for home care needs following discharge   - Consider OT consult to assist with ADL evaluation and planning for discharge  - Provide patient education as appropriate  Outcome: Progressing  Goal: Maintains/Returns to pre admission functional level  Description: INTERVENTIONS:  - Perform BMAT or MOVE assessment daily    - Set and communicate daily mobility goal to care team and patient/family/caregiver  - Collaborate with rehabilitation services on mobility goals if consulted  - Perform Range of Motion  times a day  - Reposition patient every  hours    - Dangle patient  times a day  - Stand patient  times a day  - Ambulate patie times a day  - Out of bed to chair  times a day   - Out of bed for meals  times a day  - Out of bed for toileting  - Record patient progress and toleration of activity level   Outcome: Progressing     Problem: DISCHARGE PLANNING  Goal: Discharge to home or other facility with appropriate resources  Description: INTERVENTIONS:  - Identify barriers to discharge w/patient and caregiver  - Arrange for needed discharge resources and transportation as appropriate  - Identify discharge learning needs (meds, wound care, etc )  - Arrange for interpretive services to assist at discharge as needed  - Refer to Case Management Department for coordinating discharge planning if the patient needs post-hospital services based on physician/advanced practitioner order or complex needs related to functional status, cognitive ability, or social support system  Outcome: Progressing     Problem: METABOLIC, FLUID AND ELECTROLYTES - ADULT  Goal: Fluid balance maintained  Description: INTERVENTIONS:  - Monitor labs   - Monitor I/O and WT  - Instruct patient on fluid and nutrition as appropriate  - Assess for signs & symptoms of volume excess or deficit  Outcome: Progressing     Problem: SKIN/TISSUE INTEGRITY - ADULT  Goal: Skin Integrity remains intact(Skin Breakdown Prevention)  Description: Assess:  -Perform [place] assessment every   -Clean and moisturize skin every   -Inspect skin when repositioning, toileting, and assisting with ADLS  -Assess under medical devices such as  every   -Assess extremities for adequate circulation and sensation     Bed Management:  -Have minimal linens on bed & keep smooth, unwrinkled  -Change linens as needed when moist or perspiring  -Avoid sitting or lying in one position for more than  hours while in bed  -Keep HOB at degrees     Toileting:  -Offer bedside commode  -Assess for incontinence every   -Use incontinent care products after each incontinent episode such as     Activity:  -Mobilize patient  times a day  -Encourage activity and walks on unit  -Encourage or provide ROM exercises   -Turn and reposition patient every  Hours  -Use appropriate equipment to lift or move patient in bed  -Instruct/ Assist with weight shifting every  when out of bed in chair  -Consider limitation of chair time  hour intervals    Skin Care:  -Avoid use of baby powder, tape, friction and shearing, hot water or constrictive clothing  -Relieve pressure over bony prominences using   -Do not massage red bony areas    Next Steps:  -Teach patient strategies to minimize risks such as    -Consider consults to  interdisciplinary teams such as   Outcome: Progressing

## 2023-02-22 NOTE — CONSULTS
Consult - Cardiology   Santosh Grace 80 y o  female MRN: [id number]  Unit/Bed#: [place] [address] [id number] Encounter: [id number]        Reason For Consult: New cardiomyopathy               ASSESSMENT:  Cardiomyopathy, new diagnosis, unspecified    -  on 2/18/23    - chest x-ray completed on 2/20/23 revealed vascular congestion with small bilateral pleural effusions    - TTE 2/21/23: LVEF 23%, global longitudinal strain reduced at -5%, hypokinetic: Basal anterior, basal anteroseptal, basal inferoseptal, basal inferior, basal inferolateral, mid anterior, mid anteroseptal, mid inferoseptal, mid inferior, mid inferolateral, apical anterior, apical septal, apical inferior and apical lateral and apex  Moderate left atrial dilatation  Severe aortic valve stenosis, moderate MR with mild to moderate MS, mild to moderate TR, PASP 61 mmHg, mild VT  Severe aortic valve stenosis   - via TTE as detailed above  - leaflets are severely calcified  There is severely reduced mobility  There is no evidence of regurgitation  There is severe stenosis  The aortic valve peak velocity is 2 32 m/s  The aortic valve mean gradient is 12 mmHg  The dimensionless velocity index is 0 32  The aortic valve area is 0 82 cm2  The stroke volume index is 23 70 ml/m2  The aortic valve velocity is decreased due to decreased flow (low EF)  Moderate mitral regurgitation with mild to moderate mitral valve stenosis   - via TTE as detailed above  - severe diffuse calcification  There is mildly reduced mobility of the anterior leaflet and posterior leaflet  There is annular calcification  There is moderate regurgitation  There is mild to moderate stenosis  The mitral valve mean gradient is 3mmHg  The mitral valve area by Doppler continuity equation is 1 9 cm2  Sepsis   - met sepsis criteria with leukocytosis and tachycardia suspected due to colitis  - markedly elevated procalcitonin with significant improvement     - blood cultures with no growth to date   Acute kidney injury on chronic kidney disease, stage III  Stercoral colitis  Hypertension  Diabetes mellitus type II  Hypothyroid    PLAN/ DISCUSSION:     · Status post 2 doses of 20 mg IV furosemide through hospitalization  · When ok from a renal standpoint, would recommend restarting low-dose oral diuretic with torsemide 10 mg daily  · Currently on nifedipine 30 mg twice daily  · When ok from a renal standpoint, would recommend initiating ARB/ACE with plan to titrate off nifedipine if able pending BP response  · Will begin carvedilol 3 125 mg twice daily  · Monitor volume status closely with strict intake/output, daily weight  · Renal function improved; recommend to maintain potassium > 4, magnesium > 2  History Of Present Illness:  59-year-old female presented to [facility] with complaints of 3 days of nonbloody diarrhea  Patient reported that she was straining during a bowel movement when she began having watery stools  Since then, she had been incontinent of stool requiring pads  In addition, patient with progressive weakness and poor oral intake as well  Patient endorsed bilateral lower extremity edema that had been persistent for some time  Upon assessment and evaluation, patient resting in bed comfortably  Patient is without shortness of breath, chest pain, dizziness, lightheadedness, syncope, pre-syncope, palpitations  Patient states that when he began a new diabetes medication in July, she developed shortness of breath and lower extremity edema  After two months, she discontinued the medication due to this       Past Medical History:        Past Medical History:   Diagnosis Date   • Abnormal mammogram 2008   • Anemia 12/22/2020   • Anxiety 5/15/2014   • Basal cell carcinoma (BCC) of neck 6/23/2021   • Carcinoid tumor    • Diabetes mellitus ([facility] [address] [facility] )    • Disease of thyroid gland    • High cholesterol    • Hyperlipidemia    • Hypertension    • Osteoarthritis 7/24/2008   • Pneumonia 2/17/2023   • Sciatica 2008   • Stage 3a chronic kidney disease ([facility] [address] ) 1/12/2022      Past Surgical History:   Procedure Laterality Date   • APPENDECTOMY      incidental finding at appendectomy   • LUMBAR DISC SURGERY     • TOTAL ABDOMINAL HYSTERECTOMY W/ BILATERAL SALPINGOOPHORECTOMY      benign   • TUMOR REMOVAL      carcinoid tumor surgery        Allergy:        Allergies   Allergen Reactions   • Codeine      Other reaction(s): GI problems   • Hydrochlorothiazide      Other reaction(s): HYPONATREMIA   • Hydrocodone GI Intolerance   • Ibuprofen Other (See Comments)     Pr report told to not take due to kidney problems   • Oxycodone      Other reaction(s): GI       Medications:       Prior to Admission medications    Medication Sig Start Date End Date Taking?  Authorizing Provider   diazepam (VALIUM) 2 mg tablet Take one tablet every 6 hours as needed 1/26/23  Yes Anitra Galvez MD   furosemide (LASIX) 20 mg tablet Take 1 tablet (20 mg total) by mouth daily 1/26/23  Yes Anitra Galvez MD   glyBURIDE micronized (GLYNASE) 3 mg tablet 2 In am one in pm 11/10/22  Yes Anitra Galvez MD   levothyroxine (Euthyrox) 88 mcg tablet Take 1 tablet (88 mcg total) by mouth daily 1/31/23  Yes Anitra Galvez MD   lisinopril (ZESTRIL) 40 mg tablet TAKE 1 TABLET EVERY DAY 9/15/21  Yes MICKI Marcos   lovastatin (MEVACOR) 40 MG tablet TAKE 1 TABLET EVERY DAY 11/29/22  Yes Anitra Galvez MD   NIFEdipine ER (ADALAT CC) 60 MG 24 hr tablet TAKE 1 TABLET EVERY DAY 3/17/22  Yes Anitra Galvez MD   ondansetron (ZOFRAN-ODT) 4 mg disintegrating tablet Take 1 tablet (4 mg total) by mouth every 8 (eight) hours as needed for nausea or vomiting 1/26/23  Yes Anitra Galvez MD   Blood Glucose Monitoring Suppl ([address]) w/Device KIT take by Subcutaneous route once 1/15/18   Historical Provider, MD   glucose blood test strip Use 1 each in the morning Use as instructed 7/29/22   Anitra Galvez MD   Lancets (onetouch ultrasoft) lancets Use in the morning Use as instructed 7/29/22   Glenda Guerrero MD       Family History:     Family History   Problem Relation Age of Onset   • Liver cancer Mother    • Hyperlipidemia Father    • Heart attack Father    • Heart disease Maternal Grandfather         Social History:       Social History     Socioeconomic History   • Marital status: /Civil Union     Spouse name: None   • Number of children: None   • Years of education: None   • Highest education level: None   Occupational History   • Occupation: retired   Tobacco Use   • Smoking status: Never   • Smokeless tobacco: Never   • Tobacco comments:     no exposure to passive smoke   Vaping Use   • Vaping Use: Never used   Substance and Sexual Activity   • Alcohol use: Not Currently     Comment: 0   • Drug use: No   • Sexual activity: None   Other Topics Concern   • None   Social History Narrative   • None     Social Determinants of Health     Financial Resource Strain: Not on file   Food Insecurity: No Food Insecurity   • Worried About Running Out of Food in the Last Year: Never true   • Ran Out of Food in the Last Year: Never true   Transportation Needs: No Transportation Needs   • Lack of Transportation (Medical): No   • Lack of Transportation (Non-Medical): No   Physical Activity: Not on file   Stress: Not on file   Social Connections: Not on file   Intimate Partner Violence: Not on file   Housing Stability: Low Risk    • Unable to Pay for Housing in the Last Year: No   • Number of Places Lived in the Last Year: 1   • Unstable Housing in the Last Year: No       ROS:  Negative except otherwise aforementioned above  Remainder review of systems is negative    Exam:  General:  alert, oriented and in no distress, cooperative  Head: Normocephalic, atraumatic  Eyes:  EOMI  Pupils - equal, round, reactive to accomodation  No icterus  Normal Conjunctiva     Oropharynx: moist and normal-appearing mucosa  Neck: supple, symmetrical, trachea midline   Heart: regular rate with controlled rhythm, murmur  Respiratory effort / Chest Inspection: unlabored on exam   Lungs: minimally decreased at the bases   Abdomen: flat, normal findings: bowel sounds normal and soft, non-tender  Lower Limbs: Trace right lower extremity edema       DATA:                      Echocardiogram completed on 2/21/23:   •  Left Ventricle: Left ventricular cavity size is normal  Wall thickness is mildly increased  The left ventricular ejection fraction is 23% by biplane measurement  Systolic function is severely reduced  Global longitudinal strain is reduced at -5%  Diastolic function is abnormal   •  The following segments are hypokinetic: basal anterior, basal anteroseptal, basal inferoseptal, basal inferior, basal inferolateral, mid anterior, mid anteroseptal, mid inferoseptal, mid inferior, mid inferolateral, apical anterior, apical septal, apical inferior, apical lateral and apex  •  All other segments are normal   •  Left Atrium: The atrium is moderately dilated (42-48 mL/m2)  •  Aortic Valve: The aortic valve is trileaflet  The leaflets are severely calcified  There is severely reduced mobility  There is no evidence of regurgitation  There is severe stenosis  The aortic valve peak velocity is 2 32 m/s  The aortic valve mean gradient is 12 mmHg  The dimensionless velocity index is 0 32  The aortic valve area is 0 82 cm2  The stroke volume index is 23 70 ml/m2  The aortic valve velocity is decreased due to decreased flow (low EF)  •  Mitral Valve: There is severe diffuse calcification  There is mildly reduced mobility of the anterior leaflet and posterior leaflet  There is annular calcification  There is moderate regurgitation  There is mild to moderate stenosis  The mitral valve mean gradient is 3mmHg  The mitral valve area by Doppler continuity equation is 1 9 cm2   •  Tricuspid Valve: There is mild to moderate regurgitation   The right ventricular systolic pressure is moderately elevated  The estimated right ventricular systolic pressure is 90 96 mmHg  •  Pulmonic Valve: There is mild regurgitation  Weights: Wt Readings from Last 3 Encounters:   02/22/23 64 6 kg (142 lb 6 7 oz)   01/26/23 61 1 kg (134 lb 9 6 oz)   07/21/22 59 5 kg (131 lb 3 2 oz)   , Body mass index is 30 82 kg/m²           Lab Studies:             Results from last 7 days   Lab Units 02/22/23  0426 02/21/23  0506 02/20/23  0554   WBC Thousand/uL 9 76 8 15 6 30   HEMOGLOBIN g/dL 11 3* 10 8* 9 3*   HEMATOCRIT % 36 5 34 4* 30 9*   PLATELETS Thousands/uL 260 219 183   ,   Results from last 7 days   Lab Units 02/22/23  0426 02/21/23  0506 02/20/23  0554 02/19/23  0804   POTASSIUM mmol/L 4 1 4 4 3 6 4 3   CHLORIDE mmol/L 108 109* 120* 106   CO2 mmol/L 21 19* 16* 22   BUN mg/dL 34* 41* 40* 59*   CREATININE mg/dL 1 40* 1 47* 1 32* 2 15*   CALCIUM mg/dL 8 1* 8 1* 5 9* 7 9*   ALK PHOS U/L  --  54 42 59   ALT U/L  --  9 8 12   AST U/L  --  10* 7* 11*

## 2023-02-22 NOTE — CASE MANAGEMENT
Case Management Discharge Planning Note    Patient name Steve Camp  Location [facility] 2 /[address] Mino Dennison* MRN [id number]  : 1933 Date 2023       Current Admission Date: 2023  Current Admission Diagnosis:Sepsis [facility])   Patient Active Problem List    Diagnosis Date Noted   • Hypocalcemia 2023   • Stercoral colitis 2023   • Sepsis ([facility] [address] ) 2023   • Pneumonia 2023   • EILEEN (acute kidney injury) ([facility] [address] ) 2023   • Acute CHF (congestive heart failure) ([facility] ) 2023   • Stage 3a chronic kidney disease ([facility] ) 2022   • Basal cell carcinoma (BCC) of neck 2021   • Anemia 2020   • Vertigo 2020   • Neuropathy 2019   • Anxiety 05/15/2014   • Type 2 diabetes mellitus with diabetic neuropathy, without long-term current use of insulin ([facility] ) 05/15/2014   • Hyperlipidemia 05/15/2014   • Hypertension 05/15/2014   • Hypothyroid 05/15/2014   • Microalbuminuria 2012   • Osteoarthritis 2008      LOS (days): 5  Geometric Mean LOS (GMLOS) (days): 5 00  Days to GMLOS:0     OBJECTIVE:  Risk of Unplanned Readmission Score: 14 22         Current admission status: Inpatient   Preferred Pharmacy:   [address] [place] [address]  Phone: [phone] Fax: [address]  [address] [address]  Phone: [phone] Fax: [phone]    [address] [facility] [place] [address] [place] [address]  Phone: [phone] Fax: [phone]    Primary Care Provider: Tai Farooq MD    Primary Insurance: MEDICARE  Secondary Insurance: BLUE CROSS    DISCHARGE DETAILS:    Discharge planning discussed with[de-identified] Patient's daughter Gloria Stable of Choice: Yes  Comments - Freedom of Choice: Daughter reports they have given thought to SNF choice and have decided to go with [facility]  CM notified facility in [address] [place] [address], as well as updated them on expected discharge on Friday due to results from ECHO  CM contacted family/caregiver?: Yes             Contacts  Patient Contacts: David  Relationship to Patient[de-identified] Family  Contact Method: Phone  Phone Number: [phone]  Reason/Outcome: Discharge Planning              Other Referral/Resources/Interventions Provided:  Interventions: SNF  Referral Comments: [facility] updated in [address] [place] [address]   [facility] requested PASSR - CM completed and uploaded to [address] [place] [address] referral     Would you like to participate in our [address] service program?  : No - Declined    Treatment Team Recommendation: Short Term Rehab  Discharge Destination Plan[de-identified] [address] : [address] [facility], [address] [facility]  Receiving Facility/Agency Phone Number: [phone]  Facility/Agency Fax Number: [phone]

## 2023-02-22 NOTE — NURSING NOTE
Pt does not like to be turned onto left side or remain flat on her back due to dizziness  Pt was educated on the risk of PU  Pt does get OOB and sit in the chair during the day

## 2023-02-22 NOTE — ASSESSMENT & PLAN NOTE
· 2D echo 2/21/2023 reveals ejection fraction 57%, diastolic function abnormal, severe aortic valve stenosis, moderate MR and moderate MS  · Patient had lower extremity edema which she was having for months  · Lower extremity duplex negative for DVT  · BNP elevated  · Chest x-ray revealed vascular congestion with small bilateral pleural effusions  ·  started on torsemide 20 mg daily, carvedilol 3 125 mg twice daily  · ACE inhibitor held for acute kidney injury  · Monitor I&O, daily weight, fluid restriction  · Cardiology input will be appreciated

## 2023-02-22 NOTE — ASSESSMENT & PLAN NOTE
· Likely prerenal secondary to GI losses and poor p o  intake  · Nephrology input appreciated  · Patient received furosemide 20 mg IV for volume overload 2/20  · Started on torsemide 20 mg daily  · Creatinine at baseline, will monitor  · Zee catheter was placed to monitor urine output and for urinary retention

## 2023-02-22 NOTE — PROGRESS NOTES
NEPHROLOGY PROGRESS NOTE   Santosh Grace 80 y o  female MRN: [id number]  Unit/Bed#: [place] [address] [id number] Encounter: [id number]        ASSESSMENT & PLAN    80-year-old female with a past medical history of type 2 diabetes mellitus/hypertension, hypothyroidism presented with diarrhea, stool incontinence, sepsis with a right lower lobe pneumonia, complicated by an acute kidney injury     1  Acute kidney injury-present on admission  ? Secondary to prerenal azotemia and autoregulatory failure, urinary retention? ? Peak creatinine was 2 41  ? Creatinine now improved baseline  ? Received Lasix 20 mg IV x2's with significant volume overload yesterday requiring oxygen and this has improved  ? Creatinine relatively unchanged and back at baseline  ? Urinalysis shows trace protein and small bilirubin with a specific gravity of 1 025  ? BP now elevated  ? Started Nifedipine XL and will start torsemide 20 mg daily  ? Monitor creatinine and if stable tomorrow can start RAASi     2  CKD G3bA2  ? Should have outpatient follow-up with nephrology  ? Baseline creatinine around 1 45  ? Hold ACE inhibitor  ? Creatinine is at baseline will monitor with above changes     3  Hypertension in the setting of Severe AS/Valvular Cardiomyopathy  ? Blood pressures are now elevated  ? In light of Echo findings  ? On carvedilol 3 125 mg bid which can be uptitrated  ? Nifedipine XL 30 mg BID which can be downtitrated  ? start torsemide 10 mg daily  ? Can start ace I tomorrow if creatinine stable  ? Explore Palliative care/hospice     4  Nonanion gap metabolic acidosis  ? Hyperchloremic  ? Stop IV fluids  ? Bicarbonate slightly better will hold in the setting of cardiomyopathy     5  Anemia of chronic kidney disease  ? Current hemoglobin stable   ?  We will continue to monitor H&H      DISCUSSION:    Discussed with Dr Litzy Verde and she was in agreement with plan above    SUBJECTIVE:    Patient was seen today  No cp, sob, fevers, chills  Feels week and tired    12 point review of systems was otherwise negative besides what is mentioned above      Medications:    Current Facility-Administered Medications:   •  acetaminophen (TYLENOL) tablet 650 mg, 650 mg, Oral, Q6H PRN, Uvaldo Gandhi PA-C  •  carvedilol (COREG) tablet 3 125 mg, 3 125 mg, Oral, BID With Meals, MICKI Johnson  •  cefTRIAXone (ROCEPHIN) IVPB (premix in dextrose) 1,000 mg 50 mL, 1,000 mg, Intravenous, Q24H, Estefany Tejeda PA-C, Last Rate: 100 mL/hr at 02/21/23 1406, 1,000 mg at 02/21/23 1406  •  diazepam (VALIUM) tablet 2 mg, 2 mg, Oral, Q8H PRN, Uvaldo Gandhi PA-C  •  docusate sodium (COLACE) capsule 100 mg, 100 mg, Oral, BID, Estefany Tejeda PA-C, 100 mg at 02/22/23 2146  •  heparin (porcine) subcutaneous injection 5,000 Units, 5,000 Units, Subcutaneous, Q8H [facility], 5,000 Units at 02/22/23 0706 **AND** Platelet count, , , Once, Uvaldo Gandhi PA-C  •  levothyroxine tablet 100 mcg, 100 mcg, Oral, Early Morning, Clarice Daniel MD, 100 mcg at 02/22/23 0706  •  metroNIDAZOLE (FLAGYL) tablet 500 mg, 500 mg, Oral, Q8H [facility], Prasad Bentley MD, 500 mg at 02/22/23 1875  •  NIFEdipine (PROCARDIA XL) 24 hr tablet 30 mg, 30 mg, Oral, BID, Prasad Bentley MD, 30 mg at 02/22/23 0847  •  ondansetron [facility]) injection 4 mg, 4 mg, Intravenous, Q6H PRN, Uvaldo Gandhi PA-C  •  polyethylene glycol (MIRALAX) packet 17 g, 17 g, Oral, Daily, Estefany Tejeda PA-C, 17 g at 02/22/23 1796  •  pravastatin (PRAVACHOL) tablet 40 mg, 40 mg, Oral, Daily With [place], PA-C, 40 mg at 02/21/23 1532  •  senna (SENOKOT) tablet 8 6 mg, 1 tablet, Oral, Daily, Uvaldo Gandhi PA-C, 8 6 mg at 02/22/23 0847  •  sodium bicarbonate tablet 650 mg, 650 mg, Oral, BID after meals, Tyler Rain DO, 650 mg at 02/22/23 0848    OBJECTIVE:    Vitals:    02/21/23 2000 02/21/23 2049 02/22/23 0600 02/22/23 0739   BP:  160/83  159/81   BP Location:  Left arm  Left arm   Pulse:  100  96   Resp:  18  18   Temp:  98 °F (36 7 °C)  98 8 °F (37 1 °C)   TempSrc:  Oral  Oral   SpO2: 90% 90%  91%   Weight:   64 6 kg (142 lb 6 7 oz)    Height:            Temp:  [98 °F (36 7 °C)-98 8 °F (37 1 °C)] 98 8 °F (37 1 °C)  HR:  [] 96  Resp:  [18] 18  BP: (159-183)/() 159/81  SpO2:  [85 %-94 %] 91 %     Body mass index is 30 82 kg/m²  Weight (last 2 days)     Date/Time Weight    02/22/23 0600 64 6 (142 42)    02/21/23 11:23:52 64 9 (143)    02/21/23 0905 64 9 (143)    02/21/23 0600 65 (143 3)    02/20/23 0557 65 (143 3)          I/O last 3 completed shifts: In: 240 [P O :240]  Out: 3250 [Urine:3250]    No intake/output data recorded  Physical exam:    General: no acute distress, cooperative  Eyes: conjunctivae pink, anicteric sclerae  ENT: lips and mucous membranes moist, no exudates, normal external ears  Neck: ROM intact, no JVD  Chest: No respiratory distress, no accessory muscle use  CVS: normal rate, non pericardial friction rub  Abdomen: soft, non-tender, non-distended, normoactive bowel sounds  Extremities: + edema  Skin: no rash  Neuro: awake, alert, oriented, grossly intact  Psych:  Pleasant affect    Invasive Devices:   Urethral Catheter Non-latex 16 Fr   (Active)   Amt returned on insertion(mL) 700 mL 02/20/23 1030   Reasons to continue Urinary Catheter  Acute urinary retention/obstruction failing urinary retention protocol 02/22/23 0901   Goal for Removal Voiding trial when ambulation improves 02/22/23 0901   Site Assessment Clean;Skin intact 02/22/23 0901   Zee Care Done 02/22/23 0900   Collection Container Standard drainage bag 02/22/23 0901   Securement Method Securing device (Describe) 02/22/23 0901   Output (mL) 900 mL 02/21/23 1717     Lab Results:   Results from last 7 days   Lab Units 02/22/23  0426 02/21/23  0506 02/20/23  0554 02/19/23  0804 02/19/23  0601 02/18/23  1726 02/18/23  0513 02/17/23  1451 02/17/23  1444   WBC Thousand/uL 9 76 8  15 6 30 9 43 6 37  --  14 14*  --   --    HEMOGLOBIN g/dL 11 3* 10 8* 9 3* 10 1* 6 9*  --  9 8*  --   --    HEMATOCRIT % 36 5 34 4* 30 9* 33 4* 22 8*  --  32 3*  --   --    PLATELETS Thousands/uL 260 219 183 175 123*  --  138*  --   --    POTASSIUM mmol/L 4 1 4 4 3 6 4 3 3 7  --  4 6  --   --    CHLORIDE mmol/L 108 109* 120* 106 114*  --  107  --   --    CO2 mmol/L 21 19* 16* 22 19*  --  22  --   --    BUN mg/dL 34* 41* 40* 59* 51*  --  56*  --   --    CREATININE mg/dL 1 40* 1 47* 1 32* 2 15* 1 86*  --  2 41*  --   --    CALCIUM mg/dL 8 1* 8 1* 5 9* 7 9* 6 3*  --  8 1*  --   --    MAGNESIUM mg/dL  --   --  1 6*  --   --   --  1 7*  --   --    ALK PHOS U/L  --  54 42 59 46  --  57  --   --    ALT U/L  --  9 8 12 9  --  12  --   --    AST U/L  --  10* 7* 11* 9*  --  11*  --   --    BLOOD CULTURE   --   --   --   --   --   --   --  No Growth After 4 Days  No Growth After 4 Days  LEUKOCYTES UA   --   --   --   --   --  Negative  --   --   --    BLOOD UA   --   --   --   --   --  Negative  --   --   --          Portions of the record may have been created with voice recognition software  Occasional wrong word or "sound a like" substitutions may have occurred due to the inherent limitations of voice recognition software  Read the chart carefully and recognize, using context, where substitutions have occurred  If you have any questions, please contact the dictating provider

## 2023-02-22 NOTE — PLAN OF CARE
Problem: Potential for Falls  Goal: Patient will remain free of falls  Description: INTERVENTIONS:  - Educate patient/family on patient safety including physical limitations  - Instruct patient to call for assistance with activity   - Consult OT/PT to assist with strengthening/mobility   - Keep Call bell within reach  - Keep bed low and locked with side rails adjusted as appropriate  - Keep care items and personal belongings within reach  - Initiate and maintain comfort rounds  - Make Fall Risk Sign visible to staff  - Offer Toileting every 2 Hours, in advance of need  - Initiate/Maintain bed/chair alarm  - Obtain necessary fall risk management equipment: bed rails  - Apply yellow socks and bracelet for high fall risk patients  - Consider moving patient to room near nurses station  Outcome: Progressing     Problem: MOBILITY - ADULT  Goal: Maintain or return to baseline ADL function  Description: INTERVENTIONS:  -  Assess patient's ability to carry out ADLs; assess patient's baseline for ADL function and identify physical deficits which impact ability to perform ADLs (bathing, care of mouth/teeth, toileting, grooming, dressing, etc )  - Assess/evaluate cause of self-care deficits   - Assess range of motion  - Assess patient's mobility; develop plan if impaired  - Assess patient's need for assistive devices and provide as appropriate  - Encourage maximum independence but intervene and supervise when necessary  - Involve family in performance of ADLs  - Assess for home care needs following discharge   - Consider OT consult to assist with ADL evaluation and planning for discharge  - Provide patient education as appropriate  Outcome: Progressing  Goal: Maintains/Returns to pre admission functional level  Description: INTERVENTIONS:  - Perform BMAT or MOVE assessment daily    - Set and communicate daily mobility goal to care team and patient/family/caregiver     - Collaborate with rehabilitation services on mobility goals if consulted  - Perform Range of Motion 2 times a day  - Reposition patient every 2 hours  - Dangle patient 2 times a day  - Stand patient 2 times a day  - Ambulate patient 2 times a day  - Out of bed to chair 2 times a day   - Out of bed for meals 2  times a day  - Out of bed for toileting  - Record patient progress and toleration of activity level   Outcome: Progressing     Problem: GASTROINTESTINAL - ADULT  Goal: Maintains or returns to baseline bowel function  Description: INTERVENTIONS:  - Assess bowel function  - Encourage oral fluids to ensure adequate hydration  - Administer IV fluids if ordered to ensure adequate hydration  - Administer ordered medications as needed  - Encourage mobilization and activity  - Consider nutritional services referral to assist patient with adequate nutrition and appropriate food choices  Outcome: Progressing     Problem: Knowledge Deficit  Goal: Patient/family/caregiver demonstrates understanding of disease process, treatment plan, medications, and discharge instructions  Description: Complete learning assessment and assess knowledge base    Interventions:  - Provide teaching at level of understanding  - Provide teaching via preferred learning methods  Outcome: Progressing     Problem: Prexisting or High Potential for Compromised Skin Integrity  Goal: Skin integrity is maintained or improved  Description: INTERVENTIONS:  - Identify patients at risk for skin breakdown  - Assess and monitor skin integrity  - Assess and monitor nutrition and hydration status  - Monitor labs   - Assess for incontinence   - Turn and reposition patient  - Assist with mobility/ambulation  - Relieve pressure over bony prominences  - Avoid friction and shearing  - Provide appropriate hygiene as needed including keeping skin clean and dry  - Evaluate need for skin moisturizer/barrier cream  - Collaborate with interdisciplinary team   - Patient/family teaching  - Consider wound care consult Outcome: Progressing     Problem: INFECTION - ADULT  Goal: Absence or prevention of progression during hospitalization  Description: INTERVENTIONS:  - Assess and monitor for signs and symptoms of infection  - Monitor lab/diagnostic results  - Monitor all insertion sites, i e  indwelling lines, tubes, and drains  - Monitor endotracheal if appropriate and nasal secretions for changes in amount and color  - [place] appropriate cooling/warming therapies per order  - Administer medications as ordered  - Instruct and encourage patient and family to use good hand hygiene technique  - Identify and instruct in appropriate isolation precautions for identified infection/condition  Outcome: Progressing     Problem: SAFETY ADULT  Goal: Patient will remain free of falls  Description: INTERVENTIONS:  - Educate patient/family on patient safety including physical limitations  - Instruct patient to call for assistance with activity   - Consult OT/PT to assist with strengthening/mobility   - Keep Call bell within reach  - Keep bed low and locked with side rails adjusted as appropriate  - Keep care items and personal belongings within reach  - Initiate and maintain comfort rounds  - Make Fall Risk Sign visible to staff  - Offer Toileting every 2 Hours, in advance of need  - Initiate/Maintain bed/chair alarm  - Obtain necessary fall risk management equipment: bed rails, alarms  - Apply yellow socks and bracelet for high fall risk patients  - Consider moving patient to room near nurses station  Outcome: Progressing  Goal: Maintain or return to baseline ADL function  Description: INTERVENTIONS:  -  Assess patient's ability to carry out ADLs; assess patient's baseline for ADL function and identify physical deficits which impact ability to perform ADLs (bathing, care of mouth/teeth, toileting, grooming, dressing, etc )  - Assess/evaluate cause of self-care deficits   - Assess range of motion  - Assess patient's mobility; develop plan if impaired  - Assess patient's need for assistive devices and provide as appropriate  - Encourage maximum independence but intervene and supervise when necessary  - Involve family in performance of ADLs  - Assess for home care needs following discharge   - Consider OT consult to assist with ADL evaluation and planning for discharge  - Provide patient education as appropriate  Outcome: Progressing  Goal: Maintains/Returns to pre admission functional level  Description: INTERVENTIONS:  - Perform BMAT or MOVE assessment daily    - Set and communicate daily mobility goal to care team and patient/family/caregiver  - Collaborate with rehabilitation services on mobility goals if consulted  - Perform Range of Motion 2 times a day  - Reposition patient every 2 hours    - Dangle patient 2 times a day  - Stand patient 2 times a day  - Ambulate patient 2 times a day  - Out of bed to chair 2 times a day   - Out of bed for meals 2 times a day  - Out of bed for toileting  - Record patient progress and toleration of activity level   Outcome: Progressing     Problem: DISCHARGE PLANNING  Goal: Discharge to home or other facility with appropriate resources  Description: INTERVENTIONS:  - Identify barriers to discharge w/patient and caregiver  - Arrange for needed discharge resources and transportation as appropriate  - Identify discharge learning needs (meds, wound care, etc )  - Arrange for interpretive services to assist at discharge as needed  - Refer to Case Management Department for coordinating discharge planning if the patient needs post-hospital services based on physician/advanced practitioner order or complex needs related to functional status, cognitive ability, or social support system  Outcome: Progressing     Problem: METABOLIC, FLUID AND ELECTROLYTES - ADULT  Goal: Fluid balance maintained  Description: INTERVENTIONS:  - Monitor labs   - Monitor I/O and WT  - Instruct patient on fluid and nutrition as appropriate  - Assess for signs & symptoms of volume excess or deficit  Outcome: Progressing     Problem: SKIN/TISSUE INTEGRITY - ADULT  Goal: Skin Integrity remains intact(Skin Breakdown Prevention)  Description: Assess:  -Perform [place] assessment every shift  -Clean and moisturize skin as needed  -Inspect skin when repositioning, toileting, and assisting with ADLS  -Assess under medical devices such as masimo every 4 hrs  -Assess extremities for adequate circulation and sensation     Bed Management:  -Have minimal linens on bed & keep smooth, unwrinkled  -Change linens as needed when moist or perspiring  -Avoid sitting or lying in one position for more than 2 hours while in bed  -Keep HOB at 30 degrees     Toileting:  -Offer bedside commode  -Assess for incontinence every 2 hrs  -Use incontinent care products after each incontinent episode such as bath wipes  Activity:  -Mobilize patient 2 times a day  -Encourage activity and walks on unit  -Encourage or provide ROM exercises   -Turn and reposition patient every 2 Hours  -Use appropriate equipment to lift or move patient in bed  -Instruct/ Assist with weight shifting every 15 min when out of bed in chair  -Consider limitation of chair time to 2 hour intervals    Skin Care:  -Avoid use of baby powder, tape, friction and shearing, hot water or constrictive clothing  -Relieve pressure over bony prominences using allevyn foams  -Do not massage red bony areas    Next Steps:  -Teach patient strategies to minimize risks    -Consider consults to  interdisciplinary teams   Outcome: Progressing

## 2023-02-23 LAB
ANION GAP SERPL CALCULATED.3IONS-SCNC: 10 MMOL/L (ref 4–13)
BASOPHILS # BLD AUTO: 0.04 THOUSANDS/ÂΜL (ref 0–0.1)
BASOPHILS NFR BLD AUTO: 0 % (ref 0–1)
BUN SERPL-MCNC: 28 MG/DL (ref 5–25)
CALCIUM SERPL-MCNC: 8.2 MG/DL (ref 8.4–10.2)
CHLORIDE SERPL-SCNC: 107 MMOL/L (ref 96–108)
CO2 SERPL-SCNC: 23 MMOL/L (ref 21–32)
CREAT SERPL-MCNC: 1.45 MG/DL (ref 0.6–1.3)
EOSINOPHIL # BLD AUTO: 0.05 THOUSAND/ÂΜL (ref 0–0.61)
EOSINOPHIL NFR BLD AUTO: 1 % (ref 0–6)
ERYTHROCYTE [DISTWIDTH] IN BLOOD BY AUTOMATED COUNT: 13 % (ref 11.6–15.1)
EST. AVERAGE GLUCOSE BLD GHB EST-MCNC: 206 MG/DL
FLUAV RNA RESP QL NAA+PROBE: NEGATIVE
FLUBV RNA RESP QL NAA+PROBE: NEGATIVE
GFR SERPL CREATININE-BSD FRML MDRD: 31 ML/MIN/1.73SQ M
GLUCOSE SERPL-MCNC: 96 MG/DL (ref 65–140)
HBA1C MFR BLD: 8.8 %
HCT VFR BLD AUTO: 37.6 % (ref 34.8–46.1)
HGB BLD-MCNC: 11.7 G/DL (ref 11.5–15.4)
IMM GRANULOCYTES # BLD AUTO: 0.07 THOUSAND/UL (ref 0–0.2)
IMM GRANULOCYTES NFR BLD AUTO: 1 % (ref 0–2)
LYMPHOCYTES # BLD AUTO: 1.74 THOUSANDS/ÂΜL (ref 0.6–4.47)
LYMPHOCYTES NFR BLD AUTO: 17 % (ref 14–44)
MCH RBC QN AUTO: 29.2 PG (ref 26.8–34.3)
MCHC RBC AUTO-ENTMCNC: 31.1 G/DL (ref 31.4–37.4)
MCV RBC AUTO: 94 FL (ref 82–98)
MONOCYTES # BLD AUTO: 0.78 THOUSAND/ÂΜL (ref 0.17–1.22)
MONOCYTES NFR BLD AUTO: 8 % (ref 4–12)
NEUTROPHILS # BLD AUTO: 7.47 THOUSANDS/ÂΜL (ref 1.85–7.62)
NEUTS SEG NFR BLD AUTO: 73 % (ref 43–75)
NRBC BLD AUTO-RTO: 0 /100 WBCS
PLATELET # BLD AUTO: 272 THOUSANDS/UL (ref 149–390)
PMV BLD AUTO: 10.4 FL (ref 8.9–12.7)
POTASSIUM SERPL-SCNC: 3.9 MMOL/L (ref 3.5–5.3)
RBC # BLD AUTO: 4.01 MILLION/UL (ref 3.81–5.12)
RSV RNA RESP QL NAA+PROBE: NEGATIVE
SARS-COV-2 RNA RESP QL NAA+PROBE: NEGATIVE
SODIUM SERPL-SCNC: 140 MMOL/L (ref 135–147)
WBC # BLD AUTO: 10.15 THOUSAND/UL (ref 4.31–10.16)

## 2023-02-23 RX ORDER — LOSARTAN POTASSIUM 25 MG/1
25 TABLET ORAL DAILY
Status: DISCONTINUED | OUTPATIENT
Start: 2023-02-24 | End: 2023-02-24 | Stop reason: HOSPADM

## 2023-02-23 RX ORDER — CARVEDILOL 6.25 MG/1
6.25 TABLET ORAL 2 TIMES DAILY WITH MEALS
Status: DISCONTINUED | OUTPATIENT
Start: 2023-02-23 | End: 2023-02-24 | Stop reason: HOSPADM

## 2023-02-23 RX ORDER — NIFEDIPINE 30 MG/1
30 TABLET, EXTENDED RELEASE ORAL DAILY
Status: DISCONTINUED | OUTPATIENT
Start: 2023-02-24 | End: 2023-02-23

## 2023-02-23 RX ORDER — NIFEDIPINE 30 MG/1
30 TABLET, EXTENDED RELEASE ORAL DAILY
Status: COMPLETED | OUTPATIENT
Start: 2023-02-23 | End: 2023-02-23

## 2023-02-23 RX ADMIN — CEFTRIAXONE 1000 MG: 1 INJECTION, SOLUTION INTRAVENOUS at 15:04

## 2023-02-23 RX ADMIN — NIFEDIPINE 30 MG: 30 TABLET, FILM COATED, EXTENDED RELEASE ORAL at 11:06

## 2023-02-23 RX ADMIN — HEPARIN SODIUM 5000 UNITS: 5000 INJECTION INTRAVENOUS; SUBCUTANEOUS at 13:24

## 2023-02-23 RX ADMIN — SENNOSIDES 8.6 MG: 8.6 TABLET ORAL at 07:51

## 2023-02-23 RX ADMIN — HEPARIN SODIUM 5000 UNITS: 5000 INJECTION INTRAVENOUS; SUBCUTANEOUS at 21:46

## 2023-02-23 RX ADMIN — CARVEDILOL 6.25 MG: 6.25 TABLET, FILM COATED ORAL at 16:20

## 2023-02-23 RX ADMIN — HEPARIN SODIUM 5000 UNITS: 5000 INJECTION INTRAVENOUS; SUBCUTANEOUS at 05:34

## 2023-02-23 RX ADMIN — METRONIDAZOLE 500 MG: 500 TABLET ORAL at 05:34

## 2023-02-23 RX ADMIN — TORSEMIDE 10 MG: 10 TABLET ORAL at 07:51

## 2023-02-23 RX ADMIN — POLYETHYLENE GLYCOL 3350 17 G: 17 POWDER, FOR SOLUTION ORAL at 07:51

## 2023-02-23 RX ADMIN — PRAVASTATIN SODIUM 40 MG: 40 TABLET ORAL at 16:20

## 2023-02-23 RX ADMIN — DIAZEPAM 2 MG: 2 TABLET ORAL at 00:43

## 2023-02-23 RX ADMIN — CARVEDILOL 3.12 MG: 3.12 TABLET, FILM COATED ORAL at 07:51

## 2023-02-23 RX ADMIN — LEVOTHYROXINE SODIUM 100 MCG: 100 TABLET ORAL at 05:34

## 2023-02-23 RX ADMIN — DOCUSATE SODIUM 100 MG: 100 CAPSULE, LIQUID FILLED ORAL at 07:51

## 2023-02-23 RX ADMIN — METRONIDAZOLE 500 MG: 500 TABLET ORAL at 13:40

## 2023-02-23 NOTE — ASSESSMENT & PLAN NOTE
· Likely prerenal secondary to GI losses and poor p o  intake  · Nephrology input appreciated  · Patient received furosemide 20 mg IV for volume overload 2/20  · Torsemide 10 mg daily, can start losartan if creatinine stable tomorrow  · Creatinine at baseline, will monitor  · Zee catheter was placed to monitor urine output and for urinary retention

## 2023-02-23 NOTE — ACP (ADVANCE CARE PLANNING)
Advanced Care Planning Progress Note    Serious Illness Conversation    1  What is your understanding now of where you are with your illness? Prognostic Understanding: appropriate understanding of prognosis     2  How much information about what is likely to be ahead with your illness would you like to have? Information: patient wants to be fully informed     3  What did you (clinician) communicate to the patient? Prognostic Communication: Uncertain - It can be difficult to predict what will happen with your illness  I hope you will continue to live well for a long time but I’m worried that you could get sick quickly, and I think it is important to prepare for that possibility  4  If your health situation worsens, what are your most important goals? Goals: be physically comfortable, be emotionally at peace     5  What are the biggest fears and worries about the future and your health? Fears/Worries: being dependent, pain     6  What abilities are so critical to your life that you cannot imagine living without them? Unacceptable Function: being chronically confused or not being myself, being unconscious     7  What gives you strength as you think about the future with your illness? 8  If you become sicker, how much are you willing to go through for the possibility of gaining more time? Be on a ventilator: No    9  How much does your proxy and family know about your priorities and wishes? Discussion Discussion: extensive discussion with family about goals and wishes     Royce Solis heard you say that being comfortable is really important to you  Keeping that in mind, and what we know about your illness, I recommend that we pursue outpatient hospice  This will help us make sure that your treatment plans reflect what’s important to you  How does this plan sound to you? I will do everything I can to help you through this    Patient verbalized understanding of the plan     I have spent 36minutes speaking with my patient on advanced care planning today or during this visit     Advanced directives         Jacqueline Fuentes MD

## 2023-02-23 NOTE — ASSESSMENT & PLAN NOTE
Lab Results   Component Value Date    HGBA1C 8 7 (H) 01/26/2023       Recent Labs     02/21/23  0143 02/21/23  1438 02/22/23  2137   POCGLU 86 94 113       Blood Sugar Average: Last 72 hrs:  · (P) 66 [id number]   · Patient maintained on glyburide outpatient   · Bad experience with insulin in the past and refuses any at this time

## 2023-02-23 NOTE — PROGRESS NOTES
NEPHROLOGY PROGRESS NOTE   Shanell Oliveira 80 y o  female MRN: [id number]  Unit/Bed#: [facility] [place] Lusandra Christopher [id number] Encounter: [id number]        ASSESSMENT & PLAN    25-year-old female with a past medical history of type 2 diabetes mellitus/hypertension, hypothyroidism presented with diarrhea, stool incontinence, sepsis with a right lower lobe pneumonia, complicated by an acute kidney injury     1  Acute kidney injury-present on admission  ? Secondary to prerenal azotemia and autoregulatory failure, urinary retention? ? Peak creatinine was 2 41  ? Creatinine now improved baseline  ? Received Lasix 20 mg IV x2's with significant volume overload yesterday requiring oxygen and this has improved  ? Creatinine relatively unchanged and back at baseline  ? Urinalysis shows trace protein and small bilirubin with a specific gravity of 1 025  ? BP improved  ? Creatinine stable     2  CKD G3bA2  ? Should have outpatient follow-up with nephrology  ? Baseline creatinine around 1 45  ? Hold ACE inhibitor  ? Creatinine is at baseline will monitor with above changes     3  Hypertension in the setting of Severe AS/Valvular Cardiomyopathy  ? Blood pressures are now elevated  ? In light of Echo findings  ? On carvedilol 3 125 mg bid which can be uptitrated--now 6 25 mg bid  ? Nifedipine XL 30 mg BID which can be downtitrated--decrease to daily then off tomorrow if bp stable  ? Good urine output with torsemide 10 mg daily continue  ? Can start losartan 25 mg daily tomorrow if creatinine bp stable  ? Explore Palliative care/hospice patient was asking questions regarding this     4  Nonanion gap metabolic acidosis  ? Hyperchloremic  ? Stopped IV fluids  ? Bicarbonate improved monitor     5  Anemia of chronic kidney disease  ? Current hemoglobin stable   ?  We will continue to monitor H&H      The plan of care was discussed with cardiology and slim and we are in agreement with above    SUBJECTIVE:    Patient was seen today  No cp, sob, fevers, chills  States she feels better today  Appetite is somewhat improved      12 point review of systems was otherwise negative besides what is mentioned above      Medications:    Current Facility-Administered Medications:   •  acetaminophen (TYLENOL) tablet 650 mg, 650 mg, Oral, Q6H PRN, Dolores Bañuelos PA-C  •  carvedilol (COREG) tablet 6 25 mg, 6 25 mg, Oral, BID With Meals, MICKI Johnson  •  cefTRIAXone (ROCEPHIN) IVPB (premix in dextrose) 1,000 mg 50 mL, 1,000 mg, Intravenous, Q24H, Estefany Rodríguez PA-C, Last Rate: 100 mL/hr at 02/22/23 1407, 1,000 mg at 02/22/23 1407  •  diazepam (VALIUM) tablet 2 mg, 2 mg, Oral, Q8H PRN, Dolores Bañuelos PA-C, 2 mg at 02/23/23 9241  •  docusate sodium (COLACE) capsule 100 mg, 100 mg, Oral, BID, Dolores Bañuelos PA-C, 100 mg at 02/23/23 4478  •  heparin (porcine) subcutaneous injection 5,000 Units, 5,000 Units, Subcutaneous, Q8H [address], 5,000 Units at 02/23/23 0534 **AND** Platelet count, , , Once, Dolores Bañuelos PA-C  •  levothyroxine tablet 100 mcg, 100 mcg, Oral, Early Morning, Clarice Daniel MD, 100 mcg at 02/23/23 0534  •  [START ON 2/24/2023] losartan (COZAAR) tablet 25 mg, 25 mg, Oral, Daily, MICKI Johnson  •  metroNIDAZOLE (FLAGYL) tablet 500 mg, 500 mg, Oral, Q8H [address], Ayleen Chu MD, 500 mg at 02/23/23 0534  •  NIFEdipine (PROCARDIA XL) 24 hr tablet 30 mg, 30 mg, Oral, Daily, MICKI Johnson  •  ondansetron (ZOFRAN) injection 4 mg, 4 mg, Intravenous, Q6H PRN, Dolores Bañuelos PA-C  •  polyethylene glycol (MIRALAX) packet 17 g, 17 g, Oral, Daily, Estefany Rodríguez PA-C, 17 g at 02/23/23 8311  •  pravastatin (PRAVACHOL) tablet 40 mg, 40 mg, Oral, Daily With Tawanna Frederick PA-C, 40 mg at 02/22/23 1619  •  senna (SENOKOT) tablet 8 6 mg, 1 tablet, Oral, Daily, Dolores Bañuelos PA-C, 8 6 mg at 02/23/23 0751  •  torsemide (DEMADEX) tablet 10 mg, 10 mg, Oral, Daily, Tyshawn Gomez DO, 10 mg at 02/23/23 0751    OBJECTIVE:    Vitals:    02/23/23 0533 02/23/23 0553 02/23/23 0722 02/23/23 1055   BP: 155/93  127/77 125/79   BP Location:   Left arm    Pulse: 81  85 86   Resp:   20    Temp: 98 2 °F (36 8 °C)  (!) 97 2 °F (36 2 °C) 98 1 °F (36 7 °C)   TempSrc:   Oral    SpO2: 93%  97% 93%   Weight:  62 6 kg (138 lb 0 1 oz)     Height:            Temp:  [97 2 °F (36 2 °C)-99 3 °F (37 4 °C)] 98 1 °F (36 7 °C)  HR:  [] 86  Resp:  [18-20] 20  BP: (125-160)/(77-93) 125/79  SpO2:  [89 %-97 %] 93 %     Body mass index is 29 86 kg/m²  Weight (last 2 days)     Date/Time Weight    02/23/23 0553 62 6 (138 01)    02/22/23 0600 64 6 (142 42)    02/21/23 11:23:52 64 9 (143)    02/21/23 0905 64 9 (143)    02/21/23 0600 65 (143 3)          I/O last 3 completed shifts: In: 480 [P O :480]  Out: 3300 [Urine:3300]    No intake/output data recorded  Physical exam:    General: frail elderly  Eyes: conjunctivae pink, anicteric sclerae  ENT: lips and mucous membranes moist, no exudates, normal external ears  Neck: ROM intact, no JVD  Chest: No respiratory distress, no accessory muscle use  CVS: normal rate, non pericardial friction rub  Abdomen: soft, non-tender, non-distended, normoactive bowel sounds  Extremities: + edema  Skin: no rash  Neuro: awake, alert, oriented, grossly intact  Psych:  Pleasant affect    Invasive Devices:   Urethral Catheter Non-latex 16 Fr   (Active)   Amt returned on insertion(mL) 700 mL 02/20/23 1030   Reasons to continue Urinary Catheter  Acute urinary retention/obstruction failing urinary retention protocol 02/22/23 2100   Goal for Removal Voiding trial when ambulation improves 02/22/23 2100   Site Assessment Clean;Skin intact 02/22/23 2100   Zee Care Done 02/22/23 2100   Collection Container Standard drainage bag 02/22/23 2100   Securement Method Securing device (Describe) 02/22/23 2100   Output (mL) 2200 mL 02/22/23 1626     Lab Results:   Results from last 7 days   Lab Units 02/23/23  0541 02/22/23  0426 02/21/23  0506 02/20/23  0554 02/19/23  0804 02/19/23  0601 02/18/23  1726 02/18/23  0513 02/17/23  1451 02/17/23  1444   WBC Thousand/uL 10 15 9 76 8 15 6 30 9 43 6 37  --  14 14*  --   --    HEMOGLOBIN g/dL 11 7 11 3* 10 8* 9 3* 10 1* 6 9*  --  9 8*  --   --    HEMATOCRIT % 37 6 36 5 34 4* 30 9* 33 4* 22 8*  --  32 3*  --   --    PLATELETS Thousands/uL 272 260 219 183 175 123*  --  138*  --   --    POTASSIUM mmol/L 3 9 4 1 4 4 3 6 4 3 3 7  --  4 6  --   --    CHLORIDE mmol/L 107 108 109* 120* 106 114*  --  107  --   --    CO2 mmol/L 23 21 19* 16* 22 19*  --  22  --   --    BUN mg/dL 28* 34* 41* 40* 59* 51*  --  56*  --   --    CREATININE mg/dL 1 45* 1 40* 1 47* 1 32* 2 15* 1 86*  --  2 41*  --   --    CALCIUM mg/dL 8 2* 8 1* 8 1* 5 9* 7 9* 6 3*  --  8 1*  --   --    MAGNESIUM mg/dL  --   --   --  1 6*  --   --   --  1 7*  --   --    ALK PHOS U/L  --   --  54 42 59 46  --  57  --   --    ALT U/L  --   --  9 8 12 9  --  12  --   --    AST U/L  --   --  10* 7* 11* 9*  --  11*  --   --    BLOOD CULTURE   --   --   --   --   --   --   --   --  No Growth After 5 Days  No Growth After 5 Days  LEUKOCYTES UA   --   --   --   --   --   --  Negative  --   --   --    BLOOD UA   --   --   --   --   --   --  Negative  --   --   --          Portions of the record may have been created with voice recognition software  Occasional wrong word or "sound a like" substitutions may have occurred due to the inherent limitations of voice recognition software  Read the chart carefully and recognize, using context, where substitutions have occurred  If you have any questions, please contact the dictating provider

## 2023-02-23 NOTE — CASE MANAGEMENT
Case Management Discharge Planning Note    Patient name Claudette Parnell  Location [facility] 68 2 /South 2 Deryl * MRN [id number]  : 1933 Date 2023       Current Admission Date: 2023  Current Admission Diagnosis:Sepsis [facility])   Patient Active Problem List    Diagnosis Date Noted   • Hypocalcemia 2023   • Stercoral colitis 2023   • Sepsis ([facility] [address] ) 2023   • Pneumonia 2023   • EILEEN (acute kidney injury) ([facility] [address] ) 2023   • Acute CH[facility] (congestive heart failure) ([facility] ) 2023   • Stage 3a chronic kidney disease ([facility] ) 2022   • Basal cell carcinoma (BCC) of neck 2021   • Anemia 2020   • Vertigo 2020   • Neuropathy 2019   • Anxiety 05/15/2014   • Type 2 diabetes mellitus with diabetic neuropathy, without long-term current use of insulin ([facility] ) 05/15/2014   • Hyperlipidemia 05/15/2014   • Hypertension 05/15/2014   • Hypothyroid 05/15/2014   • Microalbuminuria 2012   • Osteoarthritis 2008      LOS (days): 6  Geometric Mean LOS (GMLOS) (days): 5 00  Days to GMLOS:-1     OBJECTIVE:  Risk of Unplanned Readmission Score: 13 14         Current admission status: Inpatient   Preferred Pharmacy:   [address] [place] [address], Inga Olmedo [address] [place] [address]  Phone: [phone] Fax: [address]  [address] [address]  Phone: [phone] Fax: [phone] 42[address] [facility] [address] - [place] [address]  Phone: [phone] Fax: [phone]    Primary Care Provider: Marlin Benitez MD    Primary Insurance: MEDICARE  Secondary Insurance: BLUE CROSS    DISCHARGE DETAILS:                           Contacts  Patient Contacts: Griselda Belfast and Dez Alfonso Dtr's  Relationship to Patient[de-identified] Family  Contact Method:  In Person  Reason/Outcome: Discharge Planning                        Treatment Team Recommendation: Short Term Rehab  Discharge Destination Plan[de-identified] SNF, Short Term Rehab ([facility])  [facility] (agreeable to [address] asks for company transporting and approximate cost once known with  time)  Dispatcher Contacted: Yes  Number/Name of Dispatcher: Roundtrip  Transported by [employer] and Unit #): [employer] of Transport (Date): 02/24/23  ETA of Transport (Time): 1300              IMM Given (Date):: 02/23/23  IMM Given to[de-identified] Patient  Family notified[de-identified] Kaylee Ahn aware of dc time, transport agency and fee  Additional Comments: Discussion had re: aftercare from [address] [place] [address] where pt may either return home vs senior living (working with [place] of Jovan Ernandez in search) in which they were instructed that communication with facility SNF should be had re: dc location as well as if with VNA vs Hospice  Brief discussion had re: hospice agencies and what home hospice in either location would look like- understanding pt is not currently inpatient hospice appropriate (all being agreeable to same when reviewing criteria)  Dtrs request information be given to Danae Marcum re: dc from their facility so they are aware of this without it being lost in translation down the road- CM updated [facility] via [address]      Accepting Facility Name, [facility] : [address]  Receiving Facility/Agency Phone Number: [phone]  Facility/Agency Fax Number: [phone]

## 2023-02-23 NOTE — OCCUPATIONAL THERAPY NOTE
Occupational Therapy Progress Note     Patient Name: Madina Putnam  [facility]'U Date: 2/23/2023  Problem List  Principal Problem:    Sepsis [facility])  Active Problems:    Type 2 diabetes mellitus with diabetic neuropathy, without long-term current use of insulin (HCC)    Hypertension    Hypothyroid    Stercoral colitis    Pneumonia    EILEEN (acute kidney injury) ([facility] [address] )    Acute CHF (congestive heart failure) ([facility] )    Hypocalcemia       02/23/23 1125   OT Last Visit   OT Visit Date 02/23/23   Note Type   Note Type Treatment   Pain Assessment   Pain Assessment Tool 0-10   Pain Score No Pain   Restrictions/Precautions   Weight Bearing Precautions Per Order No   Other Precautions Contact/isolation; Chair Alarm; Bed Alarm;Multiple lines; Fall Risk   ADL   Grooming Assistance 4  Minimal Assistance   Grooming Deficit Setup;Steadying;Verbal cueing;Supervision/safety; Increased time to complete;Standing with assistive device; Wash/dry hands; Wash/dry face; Teeth care   Grooming Comments standing at bedside table - 3 min with unilateral UE support standing tolerance   Toileting Assistance  2  Maximal Assistance   Toileting Deficit Steadying;Verbal cueing;Supervison/safety; Increased time to complete;Perineal hygiene;Clothing management up;Clothing management down   Bed Mobility   Supine to Sit 4  Minimal assistance   Additional items Assist x 1;Bedrails; Increased time required;Verbal cues;LE management   Transfers   Sit to Stand 3  Moderate assistance   Additional items Assist x 1; Increased time required;Verbal cues; Other  (RW)   Stand to Sit 3  Moderate assistance   Additional items Assist x 1; Increased time required;Verbal cues; Other  (RW)   Toilet transfer 3  Moderate assistance   Additional items Assist x 1; Increased time required;Verbal cues;Standard toilet; Other;Armrests  (RW)   Functional Mobility   Functional Mobility 3  Moderate assistance   Additional Comments x10 ft   Additional items Rolling walker   Therapeutic Excerise-Strength   UE Strength Yes   Right Upper Extremity- Strength   R Shoulder Flexion; Extension;ABduction;Horizontal ABduction   R Elbow Elbow flexion;Elbow extension   R Weight/Reps/Sets 10x1 reps   Left Upper Extremity-Strength   L Shoulder Flexion; Extension;ABduction;Horizontal ABduction   L Elbow Elbow flexion;Elbow extension   L Weights/Reps/Sets 10x1 reps   Subjective   Subjective "We have to do exercises again?"   Cognition   Overall Cognitive Status [facility]   Arousal/Participation Alert; Cooperative   Attention Attends with cues to redirect   Orientation Level Oriented X4   Memory Decreased recall of precautions   Following Commands Follows one step commands without difficulty   Activity Tolerance   Activity Tolerance Patient limited by fatigue;Treatment limited secondary to medical complications (Comment)   Medical Staff Made Aware RN, CM   Assessment   Assessment Patient participated in skilled OT session this date with interventions consisting of therapeutic activities, therapeutic exercises, and self-care/ADL training to increase B UE strength, functional endurance/activity tolerance, static/dynamic sitting/standing balance, and overall safety awareness to increase (I) with ADLs/IADLs to return to [facility]  Provided education on HEP, energy conservation techniques, deep breathing techniques, fall prevention strategies, and overall safety awareness  Patient agreeable to OT treatment session, upon arrival patient was found supine in bed  Patient requiring verbal cues for safety and verbal cues for pacing through activity steps  Please refer to flowsheet for functional performance  Patient continues to be functioning below baseline level, occupational performance remains limited secondary to factors listed above and increased risk for falls and injury  Pt left seated in chair, alarm armed and call bell and bedside table within reach; all needs met     Plan   Treatment Interventions ADL retraining;Functional transfer training;UE strengthening/ROM; Endurance training;Patient/family training;Equipment evaluation/education; Compensatory technique education;Continued evaluation; Energy conservation; Activityengagement   Goal Expiration Date 03/06/23   OT Treatment Day 1   OT Frequency 3-5x/wk   Recommendation   OT Discharge Recommendation Post acute rehabilitation services   AM-PAC Daily Activity Inpatient   Lower Body Dressing 2   Bathing 2   Toileting 2   Upper Body Dressing 3   Grooming 3   Eating 4   Daily Activity Raw Score 16   Daily Activity Standardized Score (Calc for Raw Score >=11) 35 96   AM-PAC Applied Cognition Inpatient   Following a Speech/Presentation 4   Understanding Ordinary Conversation 4   Taking Medications 3   Remembering Where Things Are Placed or Put Away 3   Remembering List of 4-5 Errands 3   Taking Care of Complicated Tasks 3   Applied Cognition Raw Score 20   Applied Cognition Standardized Score 41 76     Jade Santiago

## 2023-02-23 NOTE — PLAN OF CARE
Problem: OCCUPATIONAL THERAPY ADULT  Goal: Performs self-care activities at highest level of function for planned discharge setting  See evaluation for individualized goals  Description: Treatment Interventions: ADL retraining, Functional transfer training, UE strengthening/ROM, Endurance training, Patient/family training, Equipment evaluation/education, Compensatory technique education, Continued evaluation, Energy conservation, Activityengagement          See flowsheet documentation for full assessment, interventions and recommendations  Outcome: Progressing  Note: Limitation: Decreased ADL status, Decreased UE strength, Decreased Safe judgement during ADL, Decreased endurance, Decreased self-care trans, Decreased high-level ADLs  Prognosis: Good  Assessment: Patient participated in skilled OT session this date with interventions consisting of therapeutic activities, therapeutic exercises, and self-care/ADL training to increase B UE strength, functional endurance/activity tolerance, static/dynamic sitting/standing balance, and overall safety awareness to increase (I) with ADLs/IADLs to return to PLOF  Provided education on HEP, energy conservation techniques, deep breathing techniques, fall prevention strategies, and overall safety awareness  Patient agreeable to OT treatment session, upon arrival patient was found supine in bed  Patient requiring verbal cues for safety and verbal cues for pacing through activity steps  Please refer to flowsheet for functional performance  Patient continues to be functioning below baseline level, occupational performance remains limited secondary to factors listed above and increased risk for falls and injury  Pt left seated in chair, alarm armed and call bell and bedside table within reach; all needs met       OT Discharge Recommendation: Post acute rehabilitation services

## 2023-02-23 NOTE — PLAN OF CARE
Problem: PHYSICAL THERAPY ADULT  Goal: Performs mobility at highest level of function for planned discharge setting  See evaluation for individualized goals  Description: Treatment/Interventions: Functional transfer training, LE strengthening/ROM, Therapeutic exercise, Endurance training, Patient/family training, Bed mobility, Gait training, Spoke to nursing, OT  Equipment Recommended: Michaela Castillo (Pt owns walker)       See flowsheet documentation for full assessment, interventions and recommendations  Outcome: Progressing  Note: Prognosis: Good  Problem List: Decreased strength, Decreased endurance, Decreased range of motion, Impaired balance, Decreased mobility, Decreased coordination, Impaired judgement  Assessment: pt  given cues for hand placement for STS transfers  increased time and effort noted for transfers  Pt  noted with mild LObs during ambulation and unsteady gait noted  Pt  requries hands on assist for safe ambulation  Pt  noted to be incontinent of bowels and assist provided for pericare in standing  Pt  Yanet Jacobs extending time for completing transfers and patient used bedrails for bed mobility and transfer  Pt  showing good progress with her mobility however continues to be below her PLOF and needs hands on assist for safe mobility  Continue to follow pt  per PT POC  Barriers to Discharge: None     PT Discharge Recommendation: Post acute rehabilitation services    See flowsheet documentation for full assessment

## 2023-02-23 NOTE — PLAN OF CARE
Problem: Potential for Falls  Goal: Patient will remain free of falls  Description: INTERVENTIONS:  - Educate patient/family on patient safety including physical limitations  - Instruct patient to call for assistance with activity   - Consult OT/PT to assist with strengthening/mobility   - Keep Call bell within reach  - Keep bed low and locked with side rails adjusted as appropriate  - Keep care items and personal belongings within reach  - Initiate and maintain comfort rounds  - Make Fall Risk Sign visible to staff  - Offer Toileting every  Hours, in advance of need  - Initiate/Maintain alarm  - Obtain necessary fall risk management equipment:   - Apply yellow socks and bracelet for high fall risk patients  - Consider moving patient to room near nurses station  Outcome: Progressing     Problem: MOBILITY - ADULT  Goal: Maintain or return to baseline ADL function  Description: INTERVENTIONS:  -  Assess patient's ability to carry out ADLs; assess patient's baseline for ADL function and identify physical deficits which impact ability to perform ADLs (bathing, care of mouth/teeth, toileting, grooming, dressing, etc )  - Assess/evaluate cause of self-care deficits   - Assess range of motion  - Assess patient's mobility; develop plan if impaired  - Assess patient's need for assistive devices and provide as appropriate  - Encourage maximum independence but intervene and supervise when necessary  - Involve family in performance of ADLs  - Assess for home care needs following discharge   - Consider OT consult to assist with ADL evaluation and planning for discharge  - Provide patient education as appropriate  Outcome: Progressing  Goal: Maintains/Returns to pre admission functional level  Description: INTERVENTIONS:  - Perform BMAT or MOVE assessment daily    - Set and communicate daily mobility goal to care team and patient/family/caregiver     - Collaborate with rehabilitation services on mobility goals if consulted  - Perform Range of Motion  times a day  - Reposition patient every  hours  - Dangle patient  times a day  - Stand patient  times a day  - Ambulate patient  times a day  - Out of bed to chair  times a day   - Out of bed for meals  times a day  - Out of bed for toileting  - Record patient progress and toleration of activity level   Outcome: Progressing     Problem: GASTROINTESTINAL - ADULT  Goal: Maintains or returns to baseline bowel function  Description: INTERVENTIONS:  - Assess bowel function  - Encourage oral fluids to ensure adequate hydration  - Administer IV fluids if ordered to ensure adequate hydration  - Administer ordered medications as needed  - Encourage mobilization and activity  - Consider nutritional services referral to assist patient with adequate nutrition and appropriate food choices  Outcome: Progressing     Problem: Knowledge Deficit  Goal: Patient/family/caregiver demonstrates understanding of disease process, treatment plan, medications, and discharge instructions  Description: Complete learning assessment and assess knowledge base    Interventions:  - Provide teaching at level of understanding  - Provide teaching via preferred learning methods  Outcome: Progressing     Problem: Prexisting or High Potential for Compromised Skin Integrity  Goal: Skin integrity is maintained or improved  Description: INTERVENTIONS:  - Identify patients at risk for skin breakdown  - Assess and monitor skin integrity  - Assess and monitor nutrition and hydration status  - Monitor labs   - Assess for incontinence   - Turn and reposition patient  - Assist with mobility/ambulation  - Relieve pressure over bony prominences  - Avoid friction and shearing  - Provide appropriate hygiene as needed including keeping skin clean and dry  - Evaluate need for skin moisturizer/barrier cream  - Collaborate with interdisciplinary team   - Patient/family teaching  - Consider wound care consult   Outcome: Progressing     Problem: INFECTION - ADULT  Goal: Absence or prevention of progression during hospitalization  Description: INTERVENTIONS:  - Assess and monitor for signs and symptoms of infection  - Monitor lab/diagnostic results  - Monitor all insertion sites, i e  indwelling lines, tubes, and drains  - Monitor endotracheal if appropriate and nasal secretions for changes in amount and color  - [place] appropriate cooling/warming therapies per order  - Administer medications as ordered  - Instruct and encourage patient and family to use good hand hygiene technique  - Identify and instruct in appropriate isolation precautions for identified infection/condition  Outcome: Progressing     Problem: SAFETY ADULT  Goal: Patient will remain free of falls  Description: INTERVENTIONS:  - Educate patient/family on patient safety including physical limitations  - Instruct patient to call for assistance with activity   - Consult OT/PT to assist with strengthening/mobility   - Keep Call bell within reach  - Keep bed low and locked with side rails adjusted as appropriate  - Keep care items and personal belongings within reach  - Initiate and maintain comfort rounds  - Make Fall Risk Sign visible to staff  - Offer Toileting every  Hours, in advance of need  - Initiate/Maintain alarm  - Obtain necessary fall risk management equipment:  - Apply yellow socks and bracelet for high fall risk patients  - Consider moving patient to room near nurses station  Outcome: Progressing  Goal: Maintain or return to baseline ADL function  Description: INTERVENTIONS:  -  Assess patient's ability to carry out ADLs; assess patient's baseline for ADL function and identify physical deficits which impact ability to perform ADLs (bathing, care of mouth/teeth, toileting, grooming, dressing, etc )  - Assess/evaluate cause of self-care deficits   - Assess range of motion  - Assess patient's mobility; develop plan if impaired  - Assess patient's need for assistive devices and provide as appropriate  - Encourage maximum independence but intervene and supervise when necessary  - Involve family in performance of ADLs  - Assess for home care needs following discharge   - Consider OT consult to assist with ADL evaluation and planning for discharge  - Provide patient education as appropriate  Outcome: Progressing  Goal: Maintains/Returns to pre admission functional level  Description: INTERVENTIONS:  - Perform BMAT or MOVE assessment daily    - Set and communicate daily mobility goal to care team and patient/family/caregiver  - Collaborate with rehabilitation services on mobility goals if consulted  - Perform Range of Motion  times a day  - Reposition patient every  hours    - Dangle patient  times a day  - Stand patient times a day  - Ambulate patient  times a day  - Out of bed to chair  times a day   - Out of bed for meals  times a day  - Out of bed for toileting  - Record patient progress and toleration of activity level   Outcome: Progressing     Problem: DISCHARGE PLANNING  Goal: Discharge to home or other facility with appropriate resources  Description: INTERVENTIONS:  - Identify barriers to discharge w/patient and caregiver  - Arrange for needed discharge resources and transportation as appropriate  - Identify discharge learning needs (meds, wound care, etc )  - Arrange for interpretive services to assist at discharge as needed  - Refer to Case Management Department for coordinating discharge planning if the patient needs post-hospital services based on physician/advanced practitioner order or complex needs related to functional status, cognitive ability, or social support system  Outcome: Progressing     Problem: METABOLIC, FLUID AND ELECTROLYTES - ADULT  Goal: Fluid balance maintained  Description: INTERVENTIONS:  - Monitor labs   - Monitor I/O and WT  - Instruct patient on fluid and nutrition as appropriate  - Assess for signs & symptoms of volume excess or deficit  Outcome: Progressing     Problem: SKIN/TISSUE INTEGRITY - ADULT  Goal: Skin Integrity remains intact(Skin Breakdown Prevention)  Description: Assess:  -Perform [place] assessment every   -Clean and moisturize skin every  -Inspect skin when repositioning, toileting, and assisting with ADLS  -Assess under medical devices such as   -Assess extremities for adequate circulation and sensation     Bed Management:  -Have minimal linens on bed & keep smooth, unwrinkled  -Change linens as needed when moist or perspiring  -Avoid sitting or lying in one position for more than  hours while in bed  -Keep HOB at degrees     Toileting:  -Offer bedside commode  -Assess for incontinence every   -Use incontinent care products after each incontinent episode such as     Activity:  -Mobilize patient  times a day  -Encourage activity and walks on unit  -Encourage or provide ROM exercises   -Turn and reposition patient every  Hours  -Use appropriate equipment to lift or move patient in bed  -Instruct/ Assist with weight shifting every  when out of bed in chair  -Consider limitation of chair time  hour intervals    Skin Care:  -Avoid use of baby powder, tape, friction and shearing, hot water or constrictive clothing  -Relieve pressure over bony prominences using   -Do not massage red bony areas    Next Steps:  -Teach patient strategies to minimize risks such as    -Consider consults to  interdisciplinary teams such as   Outcome: Progressing

## 2023-02-23 NOTE — PROGRESS NOTES
[address] [facility]  Progress Note - Benluis Gene 1/11/1933, 80 y o  female MRN: [id number]  Unit/Bed#: [facility] 87 [id number] Encounter: [id number]  Primary Care Provider: Verito Vaughan MD   Date and time admitted to hospital: 2/17/2023 11:09 AM    * Sepsis [facility])  Assessment & Plan  This is a 25-year-old female with history of diabetes mellitus, hypertension, hypothyroidism, neuropathy, basal cell carcinoma presented with 3-day history of nonbloody diarrhea      · Met sepsis criteria with leukocytosis and tachycardia   · Suspected due to colitis   · Markedly elevated procalcitonin, now with significant improvement, continue to monitor  · Blood cultures with no growth to date   · Continue Rocephin and Flagyl D7/7    Acute CHF (congestive heart failure) ([facility] )  Assessment & Plan  · 2D echo 2/21/2023 reveals ejection fraction 10%, diastolic function abnormal, severe aortic valve stenosis, moderate MR and moderate MS  · Patient had lower extremity edema which she was having for months  · Lower extremity duplex negative for DVT  · BNP elevated  · Chest x-ray revealed vascular congestion with small bilateral pleural effusions  · Continue torsemide 10 mg daily, carvedilol 6 25 mg daily, can start losartan if creatinine stable tomorrow  · Monitor I&O, daily weight, fluid restriction  · Cardiology input will be appreciated    EILEEN (acute kidney injury) ([facility] )  Assessment & Plan  · Likely prerenal secondary to GI losses and poor p o  intake  · Nephrology input appreciated  · Patient received furosemide 20 mg IV for volume overload 2/20  · Torsemide 10 mg daily, can start losartan if creatinine stable tomorrow  · Creatinine at baseline, will monitor  · Zee catheter was placed to monitor urine output and for urinary retention      Pneumonia  Assessment & Plan  · CT abdomen pelvis showing right lower lobe infiltrate with bilateral small effusions  · No clinical picture of pneumonia  · Already on Abx for colitis     Stercoral colitis  Assessment & Plan  · Patient presented with non-bloody diarrhea associated with poor p o  intake  · CT abdomen pelvis showing stool impaction in the rectum with changes of stercoral colitis, stool throughout colon  · Continue Rocephin and Flagyl  · Bowel regiment with senna, Colace and MiraLAX  · Pain control as needed, avoid opioids  · C  Difficile and stool PCR negative   · Patient having bowel movements    Hypothyroid  Assessment & Plan  · Uncontrolled, elevated TSH  · Patient's PCP recently increase levothyroxine from 75 to 88 mcg at the end of January- at that time TSH was 18, now 24 - we increased levothyroxine to 100 mcg daily  · Will need to repeat TSH in 3 to 4 weeks    Hypertension  Assessment & Plan  · Continue carvedilol at 6 25 mg twice daily, nifedipine XL 30 mg twice daily which can be downgraded decreased to daily then off tomorrow  · Torsemide 10 mg daily  · Can start losartan 25 mg daily tomorrow if creatinine stable    Type 2 diabetes mellitus with diabetic neuropathy, without long-term current use of insulin [facility])  Assessment & Plan  Lab Results   Component Value Date    HGBA1C 8 7 (H) 01/26/2023       Recent Labs     02/21/23  0143 02/21/23  1438 02/22/23  2137   POCGLU 86 94 113       Blood Sugar Average: Last 72 hrs:  · (P) 85 [id number]   · Patient maintained on glyburide outpatient   · Bad experience with insulin in the past and refuses any at this time      Extensive discussion with patient and patient's family regarding goals of care  Patient confirms she is a DNR/DNI  She states she wants to be kept comfortable and would be interested in hospice cares  Plan for now would be to continue for discharge at rehab  VTE Pharmacologic Prophylaxis:   Pharmacologic: Heparin    Patient Centered Rounds: I have performed bedside rounds with nursing staff today      Discussions with Specialists or Other Care Team Provider: Nephrology    Education and Discussions with Family / Patient: Patient's daughter    Time Spent for Care: More than 50% of total time spent on counseling and coordination of care as described above  Current Length of Stay: 6 day(s)    Current Patient Status: Inpatient   Certification Statement: The patient will continue to require additional inpatient hospital stay due to Acute kidney injury, pending rehab    Discharge Plan / Estimated Discharge Date: 24h    Code Status: Level 3 - DNAR and DNI      Subjective:   Patient seen and examined at bedside, comfortable, states appetite has improved today  Denies any chest pain, abdominal pain, nausea or vomiting    Objective:     Vitals:   Temp (24hrs), Av 3 °F (36 8 °C), Min:97 2 °F (36 2 °C), Max:99 3 °F (37 4 °C)    Temp:  [97 2 °F (36 2 °C)-99 3 °F (37 4 °C)] 98 1 °F (36 7 °C)  HR:  [] 86  Resp:  [18-20] 20  BP: (125-160)/(77-93) 125/79  SpO2:  [89 %-97 %] 93 %  Body mass index is 29 86 kg/m²  Input and Output Summary (last 24 hours): Intake/Output Summary (Last 24 hours) at 2023 1431  Last data filed at 2023 0540  Gross per 24 hour   Intake 480 ml   Output 3300 ml   Net -2820 ml       Physical Exam:    Constitutional: Patient is oriented to person, place and time, no acute distress  HEENT:  Normocephalic, atraumatic  Cardiovascular: Normal S1S2, RRR, No murmurs/rubs/gallops appreciated  Pulmonary:  Bilateral air entry, No rhonchi/rales/wheezing appreciated  Abdominal: Soft, Bowel sounds present, Non-tender, Non-distended  Extremities:  No cyanosis, clubbing or edema     Neurological: Awake, alert  Skin:  Warm, dry    Additional Data:     Labs:    Results from last 7 days   Lab Units 23  0541   WBC Thousand/uL 10 15   HEMOGLOBIN g/dL 11 7   HEMATOCRIT % 37 6   PLATELETS Thousands/uL 272   NEUTROS PCT % 73   LYMPHS PCT % 17   MONOS PCT % 8   EOS PCT % 1     Results from last 7 days   Lab Units 23  0541 23  0426 23  0506   POTASSIUM mmol/L 3 9   < > 4 4   CHLORIDE mmol/L 107   < > 109*   CO2 mmol/L 23   < > 19*   BUN mg/dL 28*   < > 41*   CREATININE mg/dL 1 45*   < > 1 47*   CALCIUM mg/dL 8 2*   < > 8 1*   ALK PHOS U/L  --   --  54   ALT U/L  --   --  9   AST U/L  --   --  10*    < > = values in this interval not displayed  Results from last 7 days   Lab Units 02/17/23  1444   INR  1 34*        I Have Reviewed All Lab Data Listed Above  Invasive Devices     Peripheral Intravenous Line  Duration           Peripheral IV 02/22/23 Left;Ventral (anterior) Forearm 1 day          Drain  Duration           Urethral Catheter Non-latex 16 Fr  3 days                     Recent Cultures (last 7 days):     Results from last 7 days   Lab Units 02/19/23  1147 02/17/23  1451 02/17/23  1444   BLOOD CULTURE   --  No Growth After 5 Days  No Growth After 5 Days     C DIFF TOXIN B BY PCR  Negative  --   --        Last 24 Hours Medication List:   Current Facility-Administered Medications   Medication Dose Route Frequency Provider Last Rate   • acetaminophen  650 mg Oral Q6H PRN Midge Gell, PA-C     • carvedilol  6 25 mg Oral BID With Meals MICKI Johnson     • cefTRIAXone  1,000 mg Intravenous Q24H Midge Gellen, PA-C 1,000 mg (02/22/23 1407)   • diazepam  2 mg Oral Q8H PRN Midge Gell, PA-C     • docusate sodium  100 mg Oral BID Midge Gellen, PA-C     • heparin (porcine)  5,000 Units Subcutaneous [facility] [address] [facility]     • levothyroxine  100 mcg Oral Early Morning Clarice Figueroa MD     • [START ON 2/24/2023] losartan  25 mg Oral Daily MICKI Johnson     • metroNIDAZOLE  500 mg Oral Q8H [address] Selena Gonzalez MD     • ondansetron  4 mg Intravenous Q6H PRN Midge Gellen, PA-C     • polyethylene glycol  17 g Oral Daily Midge Gell, PA-C     • pravastatin  40 mg Oral Daily With Dinner Esteafny Strickland PA-C     • senna  1 tablet Oral Daily Estefany Strickland PA-C     • torsemide  10 mg Oral Daily Dee Hernandez,           Today, Patient Was Seen By: Evangelina Calloway MD

## 2023-02-23 NOTE — ASSESSMENT & PLAN NOTE
This is a 20-year-old female with history of diabetes mellitus, hypertension, hypothyroidism, neuropathy, basal cell carcinoma presented with 3-day history of nonbloody diarrhea      · Met sepsis criteria with leukocytosis and tachycardia   · Suspected due to colitis   · Markedly elevated procalcitonin, now with significant improvement, continue to monitor  · Blood cultures with no growth to date   · Continue Rocephin and Flagyl D7/7

## 2023-02-23 NOTE — ASSESSMENT & PLAN NOTE
· Patient presented with non-bloody diarrhea associated with poor p o  intake  · CT abdomen pelvis showing stool impaction in the rectum with changes of stercoral colitis, stool throughout colon  · Continue Rocephin and Flagyl  · Bowel regiment with senna, Colace and MiraLAX  · Pain control as needed, avoid opioids  · C   Difficile and stool PCR negative   · Patient having bowel movements

## 2023-02-23 NOTE — PLAN OF CARE
Problem: Potential for Falls  Goal: Patient will remain free of falls  Description: INTERVENTIONS:  - Educate patient/family on patient safety including physical limitations  - Instruct patient to call for assistance with activity   - Consult OT/PT to assist with strengthening/mobility   - Keep Call bell within reach  - Keep bed low and locked with side rails adjusted as appropriate  - Keep care items and personal belongings within reach  - Initiate and maintain comfort rounds  - Make Fall Risk Sign visible to staff  - Offer Toileting every 2 Hours, in advance of need  - Initiate/Maintain bed alarm  - Obtain necessary fall risk management equipment: alarms   - Apply yellow socks and bracelet for high fall risk patients  - Consider moving patient to room near nurses station  Outcome: Progressing     Problem: MOBILITY - ADULT  Goal: Maintain or return to baseline ADL function  Description: INTERVENTIONS:  -  Assess patient's ability to carry out ADLs; assess patient's baseline for ADL function and identify physical deficits which impact ability to perform ADLs (bathing, care of mouth/teeth, toileting, grooming, dressing, etc )  - Assess/evaluate cause of self-care deficits   - Assess range of motion  - Assess patient's mobility; develop plan if impaired  - Assess patient's need for assistive devices and provide as appropriate  - Encourage maximum independence but intervene and supervise when necessary  - Involve family in performance of ADLs  - Assess for home care needs following discharge   - Consider OT consult to assist with ADL evaluation and planning for discharge  - Provide patient education as appropriate  Outcome: Progressing  Goal: Maintains/Returns to pre admission functional level  Description: INTERVENTIONS:  - Perform BMAT or MOVE assessment daily    - Set and communicate daily mobility goal to care team and patient/family/caregiver     - Collaborate with rehabilitation services on mobility goals if consulted  - Perform Range of Motion 4 times a day  - Reposition patient every 2 hours  - Dangle patient 3 times a day  - Stand patient 3 times a day  - Ambulate patient 3 times a day  - Out of bed to chair 3 times a day   - Out of bed for meals 3 times a day  - Out of bed for toileting  - Record patient progress and toleration of activity level   Outcome: Progressing     Problem: GASTROINTESTINAL - ADULT  Goal: Maintains or returns to baseline bowel function  Description: INTERVENTIONS:  - Assess bowel function  - Encourage oral fluids to ensure adequate hydration  - Administer IV fluids if ordered to ensure adequate hydration  - Administer ordered medications as needed  - Encourage mobilization and activity  - Consider nutritional services referral to assist patient with adequate nutrition and appropriate food choices  Outcome: Progressing     Problem: Knowledge Deficit  Goal: Patient/family/caregiver demonstrates understanding of disease process, treatment plan, medications, and discharge instructions  Description: Complete learning assessment and assess knowledge base    Interventions:  - Provide teaching at level of understanding  - Provide teaching via preferred learning methods  Outcome: Progressing     Problem: Prexisting or High Potential for Compromised Skin Integrity  Goal: Skin integrity is maintained or improved  Description: INTERVENTIONS:  - Identify patients at risk for skin breakdown  - Assess and monitor skin integrity  - Assess and monitor nutrition and hydration status  - Monitor labs   - Assess for incontinence   - Turn and reposition patient  - Assist with mobility/ambulation  - Relieve pressure over bony prominences  - Avoid friction and shearing  - Provide appropriate hygiene as needed including keeping skin clean and dry  - Evaluate need for skin moisturizer/barrier cream  - Collaborate with interdisciplinary team   - Patient/family teaching  - Consider wound care consult   Outcome: Progressing     Problem: INFECTION - ADULT  Goal: Absence or prevention of progression during hospitalization  Description: INTERVENTIONS:  - Assess and monitor for signs and symptoms of infection  - Monitor lab/diagnostic results  - Monitor all insertion sites, i e  indwelling lines, tubes, and drains  - Monitor endotracheal if appropriate and nasal secretions for changes in amount and color  - [place] appropriate cooling/warming therapies per order  - Administer medications as ordered  - Instruct and encourage patient and family to use good hand hygiene technique  - Identify and instruct in appropriate isolation precautions for identified infection/condition  Outcome: Progressing     Problem: SAFETY ADULT  Goal: Patient will remain free of falls  Description: INTERVENTIONS:  - Educate patient/family on patient safety including physical limitations  - Instruct patient to call for assistance with activity   - Consult OT/PT to assist with strengthening/mobility   - Keep Call bell within reach  - Keep bed low and locked with side rails adjusted as appropriate  - Keep care items and personal belongings within reach  - Initiate and maintain comfort rounds  - Make Fall Risk Sign visible to staff  - Offer Toileting every 2 Hours, in advance of need  - Initiate/Maintain bed alarm  - Obtain necessary fall risk management equipment: alarms   - Apply yellow socks and bracelet for high fall risk patients  - Consider moving patient to room near nurses station  Outcome: Progressing  Goal: Maintain or return to baseline ADL function  Description: INTERVENTIONS:  -  Assess patient's ability to carry out ADLs; assess patient's baseline for ADL function and identify physical deficits which impact ability to perform ADLs (bathing, care of mouth/teeth, toileting, grooming, dressing, etc )  - Assess/evaluate cause of self-care deficits   - Assess range of motion  - Assess patient's mobility; develop plan if impaired  - Assess patient's need for assistive devices and provide as appropriate  - Encourage maximum independence but intervene and supervise when necessary  - Involve family in performance of ADLs  - Assess for home care needs following discharge   - Consider OT consult to assist with ADL evaluation and planning for discharge  - Provide patient education as appropriate  Outcome: Progressing  Goal: Maintains/Returns to pre admission functional level  Description: INTERVENTIONS:  - Perform BMAT or MOVE assessment daily    - Set and communicate daily mobility goal to care team and patient/family/caregiver  - Collaborate with rehabilitation services on mobility goals if consulted  - Perform Range of Motion 4 times a day  - Reposition patient every 2 hours    - Dangle patient 3 times a day  - Stand patient 3 times a day  - Ambulate patient 3 times a day  - Out of bed to chair 3 times a day   - Out of bed for meals 3 times a day  - Out of bed for toileting  - Record patient progress and toleration of activity level   Outcome: Progressing     Problem: DISCHARGE PLANNING  Goal: Discharge to home or other facility with appropriate resources  Description: INTERVENTIONS:  - Identify barriers to discharge w/patient and caregiver  - Arrange for needed discharge resources and transportation as appropriate  - Identify discharge learning needs (meds, wound care, etc )  - Arrange for interpretive services to assist at discharge as needed  - Refer to Case Management Department for coordinating discharge planning if the patient needs post-hospital services based on physician/advanced practitioner order or complex needs related to functional status, cognitive ability, or social support system  Outcome: Progressing     Problem: METABOLIC, FLUID AND ELECTROLYTES - ADULT  Goal: Fluid balance maintained  Description: INTERVENTIONS:  - Monitor labs   - Monitor I/O and WT  - Instruct patient on fluid and nutrition as appropriate  - Assess for signs & symptoms of volume excess or deficit  Outcome: Progressing     Problem: SKIN/TISSUE INTEGRITY - ADULT  Goal: Skin Integrity remains intact(Skin Breakdown Prevention)  Description: Assess:  -Perform [place] assessment every shift   -Clean and moisturize skin every shift   -Inspect skin when repositioning, toileting, and assisting with ADLS  -Assess under medical devices such as iv every shift   -Assess extremities for adequate circulation and sensation     Bed Management:  -Have minimal linens on bed & keep smooth, unwrinkled  -Change linens as needed when moist or perspiring  -Avoid sitting or lying in one position for more than 2  hours while in bed  -Keep HOB at 30 degrees     Toileting:  -Offer bedside commode  -Assess for incontinence every shift   -Use incontinent care products after each incontinent episode such as foam cleanser     Activity:  -Mobilize patient 3  times a day  -Encourage activity and walks on unit  -Encourage or provide ROM exercises   -Turn and reposition patient every 2 Hours  -Use appropriate equipment to lift or move patient in bed  -Instruct/ Assist with weight shifting every shift when out of bed in chair  -Consider limitation of chair time 2  hour intervals    Skin Care:  -Avoid use of baby powder, tape, friction and shearing, hot water or constrictive clothing  -Relieve pressure over bony prominences using pillows   -Do not massage red bony areas    Next Steps:  -Teach patient strategies to minimize risks such as wounds    -Consider consults to  interdisciplinary teams such as wound care   Outcome: Progressing

## 2023-02-23 NOTE — PROGRESS NOTES
Progress Note - Cardiology   Daniel Kuo 80 y o  female MRN: [id number]  [facility] Encounter: [id number]    Assessment:  Severe cardiomyopathy, new diagnosis, unspecified               -  on 2/18/23               - chest x-ray completed on 2/20/23 revealed vascular congestion with small bilateral pleural effusions               - TTE 2/21/23: LVEF 23%, global longitudinal strain reduced at -5%, hypokinetic: Basal anterior, basal anteroseptal, basal inferoseptal, basal inferior, basal inferolateral, mid anterior, mid anteroseptal, mid inferoseptal, mid inferior, mid inferolateral, apical anterior, apical septal, apical inferior and apical lateral and apex  Moderate left atrial dilatation  Severe aortic valve stenosis, moderate MR with mild to moderate MS, mild to moderate TR, PASP 61 mmHg, mild VT  Severe aortic valve stenosis              - via TTE as detailed above  - leaflets are severely calcified  There is severely reduced mobility  There is no evidence of regurgitation  There is severe stenosis  The aortic valve peak velocity is 2 32 m/s  The aortic valve mean gradient is 12 mmHg  The dimensionless velocity index is 0 32  The aortic valve area is 0 82 cm2  The stroke volume index is 23 70 ml/m2  The aortic valve velocity is decreased due to decreased flow (low EF)  Moderate mitral regurgitation with mild to moderate mitral valve stenosis              - via TTE as detailed above  - severe diffuse calcification  There is mildly reduced mobility of the anterior leaflet and posterior leaflet  There is annular calcification  There is moderate regurgitation  There is mild to moderate stenosis  The mitral valve mean gradient is 3mmHg  The mitral valve area by Doppler continuity equation is 1 9 cm2  Sepsis              - met sepsis criteria with leukocytosis and tachycardia suspected due to colitis                - markedly elevated procalcitonin with significant improvement  - blood cultures with no growth  Acute kidney injury on chronic kidney disease, stage III  Stercoral colitis  Hypertension  Diabetes mellitus type II  Hypothyroid    Plan/ Discussion:  · Torsemide 10 mg daily initiated yesterday, 2/22/23  · Currently on nifedipine 30 mg twice daily, carvedilol 3 125 mg twice daily  · After discussion with nephrology, will decrease nifedipine to 30 mg daily today and discontinue thereafter  · Will increase carvedilol to 6 25 mg beginning this evening  · Will initiate losartan 25 mg daily to begin tomorrow  Hopeful to transition patient to [facility] in the outpatient setting  · Continue statin therapy with pravastatin 40 mg daily  · Monitor volume status closely with strict intake/output, daily weight  · Monitor renal function and electrolytes; maintain potassium > 4, magnesium > 2    · As noted prior, patient is not interested in LifeVest or surgery  We will plan for medical management  Subjective:  Patient seen and evaluated at the bedside  Patient states she required supplemental oxygen overnight however at present is not and is without shortness of breath  She denies chest pain       Vitals:  Vitals:    02/22/23 0600 02/23/23 0553   Weight: 64 6 kg (142 lb 6 7 oz) 62 6 kg (138 lb 0 1 oz)   ,  Vitals:    02/22/23 2057 02/23/23 0533 02/23/23 0553 02/23/23 0722   BP: 155/85 155/93  127/77   BP Location:    Left arm   Pulse: 94 81  85   Resp: 19   20   Temp: 99 3 °F (37 4 °C) 98 2 °F (36 8 °C)  (!) 97 2 °F (36 2 °C)   TempSrc:    Oral   SpO2: 92% 93%  97%   Weight:   62 6 kg (138 lb 0 1 oz)    Height:           Exam:  General: Alert awake and oriented, no acute distress  Heart:  Regular rate with controlled rhythm, murmur   Respiratory effort/ Lungs:  Diminished breath sounds bilaterally   Abdominal: Non-tender to palpation, + bowel sounds, soft, no masses or distension  Lower Limbs:  No overt edema           Medications:    Current Facility-Administered Medications:   •  acetaminophen (TYLENOL) tablet 650 mg, 650 mg, Oral, Q6H PRN, Irene Leung PA-C  •  carvedilol (COREG) tablet 3 125 mg, 3 125 mg, Oral, BID With Meals, MICKI Johnson, 3 125 mg at 02/23/23 0751  •  cefTRIAXone (ROCEPHIN) IVPB (premix in dextrose) 1,000 mg 50 mL, 1,000 mg, Intravenous, Q24H, Estefany Vázquez PA-C, Last Rate: 100 mL/hr at 02/22/23 1407, 1,000 mg at 02/22/23 1407  •  diazepam (VALIUM) tablet 2 mg, 2 mg, Oral, Q8H PRN, Irene Leung PA-C, 2 mg at 02/23/23 0249  •  docusate sodium (COLACE) capsule 100 mg, 100 mg, Oral, BID, Irene Leung PA-C, 100 mg at 02/23/23 8129  •  heparin (porcine) subcutaneous injection 5,000 Units, 5,000 Units, Subcutaneous, Q8H [facility], 5,000 Units at 02/23/23 0534 **AND** Platelet count, , , Once, Irene Leung PA-C  •  levothyroxine tablet 100 mcg, 100 mcg, Oral, Early Morning, Clarice Daniel MD, 100 mcg at 02/23/23 0534  •  metroNIDAZOLE (FLAGYL) tablet 500 mg, 500 mg, Oral, Q8H [facility], Nigel Monsivais MD, 500 mg at 02/23/23 0534  •  NIFEdipine (PROCARDIA XL) 24 hr tablet 30 mg, 30 mg, Oral, BID, Nigel Monsivais MD, 30 mg at 02/22/23 2437  •  [facility] 4 mg, 4 mg, Intravenous, Q6H PRN, Irene Leung PA-C  •  polyethylene glycol (MIRALAX) packet 17 g, 17 g, Oral, Daily, Estefany Vázquez PA-C, 17 g at 02/23/23 1791  •  pravastatin (PRAVACHOL) tablet 40 mg, 40 mg, Oral, Daily With Caleb Actis, PA-C, 40 mg at 02/22/23 1619  •  senna (SENOKOT) tablet 8 6 mg, 1 tablet, Oral, Daily, Irene Leung PA-C, 8 6 mg at 02/23/23 0751  •  torsemide (DEMADEX) tablet 10 mg, 10 mg, Oral, Daily, Tyler Rain DO, 10 mg at 02/23/23 0751      Labs/Data:        Results from last 7 days   Lab Units 02/23/23  0541 02/22/23  0426 02/21/23  0506   WBC Thousand/uL 10 15 9 76 8 15   HEMOGLOBIN g/dL 11 7 11 3* 10 8*   HEMATOCRIT % 37 6 36 5 34 4* PLATELETS Thousands/uL 272 260 219     Results from last 7 days   Lab Units 02/23/23  0541 02/22/23  0426 02/21/23  0506   POTASSIUM mmol/L 3 9 4 1 4 4   CHLORIDE mmol/L 107 108 109*   CO2 mmol/L 23 21 19*   BUN mg/dL 28* 34* 41*   CREATININE mg/dL 1 45* 1 40* 1 47*

## 2023-02-23 NOTE — ASSESSMENT & PLAN NOTE
· 2D echo 2/21/2023 reveals ejection fraction 82%, diastolic function abnormal, severe aortic valve stenosis, moderate MR and moderate MS  · Patient had lower extremity edema which she was having for months  · Lower extremity duplex negative for DVT  · BNP elevated  · Chest x-ray revealed vascular congestion with small bilateral pleural effusions  · Continue torsemide 10 mg daily, carvedilol 6 25 mg daily, can start losartan if creatinine stable tomorrow  · Monitor I&O, daily weight, fluid restriction  · Cardiology input will be appreciated

## 2023-02-23 NOTE — PHYSICAL THERAPY NOTE
Physical Therapy Treatment Note     02/23/23 1454   PT Last Visit   PT Visit Date 02/23/23   Note Type   Note Type Treatment   Pain Assessment   Pain Assessment Tool 0-10   Pain Score No Pain   Restrictions/Precautions   Weight Bearing Precautions Per Order No   Other Precautions Chair Alarm; Fall Risk;Telemetry   General   Chart Reviewed Yes   Family/Caregiver Present Yes   Cognition   Overall Cognitive Status WFL   Subjective   Subjective Pt  agreeable to PT  Pt  in bed upon entry   Bed Mobility   Rolling R 5  Supervision   Additional items Bedrails; Increased time required   Rolling L 5  Supervision   Additional items Bedrails; Increased time required   Supine to Sit 4  Minimal assistance   Additional items Assist x 1; Increased time required; Bedrails;HOB elevated   Transfers   Sit to Stand 4  Minimal assistance   Additional items Assist x 1; Increased time required;Verbal cues   Stand to Sit 4  Minimal assistance   Additional items Assist x 1; Increased time required;Verbal cues;Armrests   Stand pivot 4  Minimal assistance   Additional items Assist x 1; Increased time required;Armrests; Verbal cues   Ambulation/Elevation   Gait pattern Improper Weight shift; Forward Flexion;Decreased foot clearance; Inconsistent lisbet; Foward flexed; Short stride; Excessively slow;Decreased heel strike;Decreased toe off  (Unsteady gait with mild LOBs)   Gait Assistance 4  Minimal assist   Additional items Assist x 1;Verbal cues   Assistive Device Rolling walker   Distance 36ft   Balance   Static Sitting Fair +   Dynamic Sitting Fair   Static Standing Fair -   Dynamic Standing Poor +   Ambulatory Poor +   Endurance Deficit   Endurance Deficit Yes   Endurance Deficit Description Weakness, impaired balance   Activity Tolerance   Activity Tolerance Patient tolerated treatment well   Nurse Made Aware Yes   Assessment   Prognosis Good   Problem List Decreased strength;Decreased endurance;Decreased range of motion; Impaired balance;Decreased mobility; Decreased coordination; Impaired judgement   Assessment pt  given cues for hand placement for STS transfers  increased time and effort noted for transfers  Pt  noted with mild LObs during ambulation and unsteady gait noted  Pt  requries hands on assist for safe ambulation  Pt  noted to be incontinent of bowels and assist provided for pericare in standing  Pt  Sona Abler extending time for completing transfers and patient used bedrails for bed mobility and transfer  Pt  showing good progress with her mobility however continues to be below her PLOF and needs hands on assist for safe mobility  Continue to follow pt  per PT POC  Barriers to Discharge None   Goals   Patient Goals None reported   STG Expiration Date 03/05/23   PT Treatment Day 2   Plan   Treatment/Interventions Functional transfer training;Family;Spoke to nursing;Gait training;Bed mobility; Equipment eval/education;Patient/family training   Progress Progressing toward goals   PT Frequency 3-5x/wk   Recommendation   PT Discharge Recommendation Post acute rehabilitation services   Equipment Recommended Walker   AM-PAC Basic Mobility Inpatient   Turning in Flat Bed Without Bedrails 3   Lying on Back to Sitting on Edge of Flat Bed Without Bedrails 3   Moving Bed to Chair 3   Standing Up From Chair Using Arms 3   Walk in Room 3   Climb 3-5 Stairs With Railing 2   Basic Mobility Inpatient Raw Score 17   Basic Mobility Standardized Score 39 67   Highest Level Of Mobility   JH-HLM Goal 5: Stand one or more mins   JH-HLM Achieved 7: Walk 25 feet or more   End of Consult   Patient Position at End of Consult Bed/Chair alarm activated; All needs within reach; Bedside chair           Mary Grace Anaya PTA    An AM-PAC basic mobility standardized score less than 42 9 suggest the patient may benefit from discharge to post-acute rehab services

## 2023-02-24 VITALS
WEIGHT: 139.77 LBS | TEMPERATURE: 98.3 F | OXYGEN SATURATION: 94 % | BODY MASS INDEX: 30.15 KG/M2 | HEIGHT: 57 IN | HEART RATE: 79 BPM | SYSTOLIC BLOOD PRESSURE: 126 MMHG | RESPIRATION RATE: 18 BRPM | DIASTOLIC BLOOD PRESSURE: 63 MMHG

## 2023-02-24 LAB
ANION GAP SERPL CALCULATED.3IONS-SCNC: 8 MMOL/L (ref 4–13)
BUN SERPL-MCNC: 29 MG/DL (ref 5–25)
CALCIUM SERPL-MCNC: 7.8 MG/DL (ref 8.4–10.2)
CHLORIDE SERPL-SCNC: 104 MMOL/L (ref 96–108)
CO2 SERPL-SCNC: 26 MMOL/L (ref 21–32)
CREAT SERPL-MCNC: 1.5 MG/DL (ref 0.6–1.3)
GFR SERPL CREATININE-BSD FRML MDRD: 30 ML/MIN/1.73SQ M
GLUCOSE SERPL-MCNC: 165 MG/DL (ref 65–140)
POTASSIUM SERPL-SCNC: 3.6 MMOL/L (ref 3.5–5.3)
SODIUM SERPL-SCNC: 138 MMOL/L (ref 135–147)

## 2023-02-24 RX ORDER — CARVEDILOL 6.25 MG/1
6.25 TABLET ORAL 2 TIMES DAILY WITH MEALS
Refills: 0
Start: 2023-02-24

## 2023-02-24 RX ORDER — TORSEMIDE 10 MG/1
10 TABLET ORAL DAILY
Refills: 0
Start: 2023-02-25

## 2023-02-24 RX ORDER — LOSARTAN POTASSIUM 25 MG/1
25 TABLET ORAL DAILY
Refills: 0
Start: 2023-02-25

## 2023-02-24 RX ORDER — SENNOSIDES 8.6 MG
8.6 TABLET ORAL DAILY
Refills: 0
Start: 2023-02-25

## 2023-02-24 RX ADMIN — HEPARIN SODIUM 5000 UNITS: 5000 INJECTION INTRAVENOUS; SUBCUTANEOUS at 05:20

## 2023-02-24 RX ADMIN — LEVOTHYROXINE SODIUM 100 MCG: 100 TABLET ORAL at 05:20

## 2023-02-24 RX ADMIN — SENNOSIDES 8.6 MG: 8.6 TABLET ORAL at 08:59

## 2023-02-24 RX ADMIN — DOCUSATE SODIUM 100 MG: 100 CAPSULE, LIQUID FILLED ORAL at 08:59

## 2023-02-24 RX ADMIN — LOSARTAN POTASSIUM 25 MG: 25 TABLET, FILM COATED ORAL at 08:59

## 2023-02-24 RX ADMIN — TORSEMIDE 10 MG: 10 TABLET ORAL at 08:59

## 2023-02-24 RX ADMIN — CARVEDILOL 6.25 MG: 6.25 TABLET, FILM COATED ORAL at 08:59

## 2023-02-24 NOTE — ASSESSMENT & PLAN NOTE
· Continue carvedilol at 6 25 mg twice daily, nifedipine XL 30 mg twice daily which can be downgraded decreased to daily then off tomorrow  · Torsemide 10 mg daily  · Can start losartan 25 mg daily tomorrow if creatinine stable

## 2023-02-24 NOTE — PROGRESS NOTES
Cardiology Progress Note - Refugio Sales 80 y o  female MRN: [id number]    [facility] Encounter: [id number]      Assessment & Plan:    Acute heart failure with reduced ejection fraction  - New cardiomyopathy  - TTE 2/21/2023 showed EF 23%, moderate LAE, severe AS, moderate MR, mild to moderate MS, mild to moderate TR, estimated PA pressure 61 mmHg, mild NH  -Continue with torsemide 10 mg daily  -Status appears stable    Severe aortic stenosis  - TTE 2/21/2023 showed peak velocity 2 32 m/s, mean gradient 12 mmHg, DVI 0 32, REGINALD 0 82  - Aortic valve velocity and gradient less than expected likely due to low-flow low gradient AS  -Patient does not want any surgical intervention, plan for medical management    [facility]  - Met sepsis criteria on admission with leukocytosis and tachycardia  - Likely due to colitis noted on CT abdomen pelvis on admission  - Procalcitonin also elevated on admission    Type 2 diabetes mellitus with diabetic neuropathy, without long-term current use of insulin ([facility])    Hypertension  -Currently on carvedilol 6 25 mg twice daily, and losartan 25 mg daily  - Also on this to treat her underlying cardiomyopathy  -Can consider changing losartan to Entresto after discharge    Hypothyroid    Stercoral colitis    Pneumonia    EILEEN (acute kidney injury) ([facility] )    Hypocalcemia     Summary:  -Vitals are stable today and she appears euvolemic  - She is stable from a cardiac standpoint for discharge to rehab   -will message office to arrange a follow-up appointment for her after discharge    Subjective:   No significant events overnight  Patient daughter was present at bedside today  She reports feeling well today and has no complaints  Denies chest pain, shortness of breath at rest, orthopnea, abdominal pain, nausea, vomiting, fever, chills, dizziness or palpitations      Objective:     Vitals: Blood pressure 126/63, pulse 79, temperature 98 3 °F (36 8 °C), temperature source Oral, resp  rate 18, height 4' 9" (1 448 m), weight 63 4 kg (139 lb 12 4 oz), SpO2 94 %  , Body mass index is 30 25 kg/m² ,   Orthostatic Blood Pressures    Flowsheet Row Most Recent Value   Blood Pressure 126/63 filed at 02/24/2023 4414   Patient Position - Orthostatic VS Lying filed at 02/24/2023 0749            Intake/Output Summary (Last 24 hours) at 2/24/2023 1101  Last data filed at 2/24/2023 5425  Gross per 24 hour   Intake 240 ml   Output 1000 ml   Net -760 ml           Physical Exam:    GEN: Madina Putnam appears well, alert and oriented x 3, pleasant and cooperative   HEENT: anicteric, mucous membranes moist  NECK: + Trace JVD  HEART: Regular rate and rhythm, 3/6 systolic murmur  LUNGS: clear to auscultation bilaterally; no wheezes, rales, or rhonchi   ABDOMEN: normal bowel sounds, soft, no tenderness, no distention  EXTREMITIES: peripheral pulses normal; no clubbing, cyanosis, or edema  NEURO: no focal findings   SKIN: normal without suspicious lesions on exposed skin      Current Facility-Administered Medications:   •  acetaminophen (TYLENOL) tablet 650 mg, 650 mg, Oral, Q6H PRN, Benito Oviedo PA-C  •  carvedilol (COREG) tablet 6 25 mg, 6 25 mg, Oral, BID With Meals, MICKI Johnson, 6 25 mg at 02/24/23 8119  •  diazepam (VALIUM) tablet 2 mg, 2 mg, Oral, Q8H PRN, Benito Oviedo PA-C, 2 mg at 02/23/23 6353  •  docusate sodium (COLACE) capsule 100 mg, 100 mg, Oral, BID, Benito Oviedo PA-C, 100 mg at 02/24/23 6407  •  heparin (porcine) subcutaneous injection 5,000 Units, 5,000 Units, Subcutaneous, Q8H [address], 5,000 Units at 02/24/23 0520 **AND** Platelet count, , , Once, Estefany Espinoza PA-C  •  levothyroxine tablet 100 mcg, 100 mcg, Oral, Early Morning, Clarice Daniel MD, 100 mcg at 02/24/23 0520  •  losartan (COZAAR) tablet 25 mg, 25 mg, Oral, Daily, MICKI Johnson, 25 mg at 02/24/23 0896  •  ondansetron (ZOFRAN) injection 4 mg, 4 mg, Intravenous, Q6H PRN, Dago Pettit PA-C  •  polyethylene glycol [facility]) packet 17 g, 17 g, Oral, Daily, Estefany MukherjeeJESUS, 17 g at 02/23/23 2105  •  pravastatin (PRAVACHOL) tablet 40 mg, 40 mg, Oral, Daily With Terryl Carrel, PA-C, 40 mg at 02/23/23 1620  •  senna (SENOKOT) tablet 8 6 mg, 1 tablet, Oral, Daily, Dago Pettit PA-C, 8 6 mg at 02/24/23 0018  •  torsemide (DEMADEX) tablet 10 mg, 10 mg, Oral, Daily, Tylershantelle Rain, , 10 mg at 02/24/23 0859    Labs & Results:    Lab Results   Component Value Date    TROPONINI 0 02 11/23/2020       Lab Results   Component Value Date    GLUCOSE 205 (H) 05/24/2018    CALCIUM 7 8 (L) 02/24/2023     05/24/2018    K 3 6 02/24/2023    CO2 26 02/24/2023     02/24/2023    BUN 29 (H) 02/24/2023    CREATININE 1 50 (H) 02/24/2023       Lab Results   Component Value Date    WBC 10 15 02/23/2023    HGB 11 7 02/23/2023    HCT 37 6 02/23/2023    MCV 94 02/23/2023     02/23/2023     Results from last 7 days   Lab Units 02/17/23  1444   INR  1 34*       No results found for: CHOL  No results found for: HDL  No results found for: LDLCALC  No results found for: TRIG    Lab Results   Component Value Date    ALT 9 02/21/2023    AST 10 (L) 02/21/2023         EKG personally reviewed by )Rozina Elam MD  No acute changes

## 2023-02-24 NOTE — ASSESSMENT & PLAN NOTE
Lab Results   Component Value Date    HGBA1C 8 8 (H) 02/22/2023       Recent Labs     02/21/23  1438 02/22/23  2137   POCGLU 94 113       Blood Sugar Average: Last 72 hrs:  · (P) 41 [id number]   · Patient maintained on glyburide outpatient   · Bad experience with insulin in the past and refuses any at this time

## 2023-02-24 NOTE — PLAN OF CARE
Problem: Potential for Falls  Goal: Patient will remain free of falls  Description: INTERVENTIONS:  - Educate patient/family on patient safety including physical limitations  - Instruct patient to call for assistance with activity   - Consult OT/PT to assist with strengthening/mobility   - Keep Call bell within reach  - Keep bed low and locked with side rails adjusted as appropriate  - Keep care items and personal belongings within reach  - Initiate and maintain comfort rounds  - Make Fall Risk Sign visible to staff  - Offer Toileting every 2  Hours, in advance of need  - Initiate/Maintain bed alarm  - Obtain necessary fall risk management equipment: alarms   - Apply yellow socks and bracelet for high fall risk patients  - Consider moving patient to room near nurses station  Outcome: Progressing     Problem: MOBILITY - ADULT  Goal: Maintain or return to baseline ADL function  Description: INTERVENTIONS:  -  Assess patient's ability to carry out ADLs; assess patient's baseline for ADL function and identify physical deficits which impact ability to perform ADLs (bathing, care of mouth/teeth, toileting, grooming, dressing, etc )  - Assess/evaluate cause of self-care deficits   - Assess range of motion  - Assess patient's mobility; develop plan if impaired  - Assess patient's need for assistive devices and provide as appropriate  - Encourage maximum independence but intervene and supervise when necessary  - Involve family in performance of ADLs  - Assess for home care needs following discharge   - Consider OT consult to assist with ADL evaluation and planning for discharge  - Provide patient education as appropriate  Outcome: Progressing  Goal: Maintains/Returns to pre admission functional level  Description: INTERVENTIONS:  - Perform BMAT or MOVE assessment daily    - Set and communicate daily mobility goal to care team and patient/family/caregiver     - Collaborate with rehabilitation services on mobility goals if consulted  - Perform Range of Motion 4 times a day  - Reposition patient every 2 hours  - Dangle patient 3 times a day  - Stand patient 3 times a day  - Ambulate patient 3 times a day  - Out of bed to chair 3 times a day   - Out of bed for meals 3 times a day  - Out of bed for toileting  - Record patient progress and toleration of activity level   Outcome: Progressing     Problem: GASTROINTESTINAL - ADULT  Goal: Maintains or returns to baseline bowel function  Description: INTERVENTIONS:  - Assess bowel function  - Encourage oral fluids to ensure adequate hydration  - Administer IV fluids if ordered to ensure adequate hydration  - Administer ordered medications as needed  - Encourage mobilization and activity  - Consider nutritional services referral to assist patient with adequate nutrition and appropriate food choices  Outcome: Progressing     Problem: Knowledge Deficit  Goal: Patient/family/caregiver demonstrates understanding of disease process, treatment plan, medications, and discharge instructions  Description: Complete learning assessment and assess knowledge base    Interventions:  - Provide teaching at level of understanding  - Provide teaching via preferred learning methods  Outcome: Progressing     Problem: Prexisting or High Potential for Compromised Skin Integrity  Goal: Skin integrity is maintained or improved  Description: INTERVENTIONS:  - Identify patients at risk for skin breakdown  - Assess and monitor skin integrity  - Assess and monitor nutrition and hydration status  - Monitor labs   - Assess for incontinence   - Turn and reposition patient  - Assist with mobility/ambulation  - Relieve pressure over bony prominences  - Avoid friction and shearing  - Provide appropriate hygiene as needed including keeping skin clean and dry  - Evaluate need for skin moisturizer/barrier cream  - Collaborate with interdisciplinary team   - Patient/family teaching  - Consider wound care consult   Outcome: Progressing     Problem: INFECTION - ADULT  Goal: Absence or prevention of progression during hospitalization  Description: INTERVENTIONS:  - Assess and monitor for signs and symptoms of infection  - Monitor lab/diagnostic results  - Monitor all insertion sites, i e  indwelling lines, tubes, and drains  - Monitor endotracheal if appropriate and nasal secretions for changes in amount and color  - [place] appropriate cooling/warming therapies per order  - Administer medications as ordered  - Instruct and encourage patient and family to use good hand hygiene technique  - Identify and instruct in appropriate isolation precautions for identified infection/condition  Outcome: Progressing     Problem: SAFETY ADULT  Goal: Patient will remain free of falls  Description: INTERVENTIONS:  - Educate patient/family on patient safety including physical limitations  - Instruct patient to call for assistance with activity   - Consult OT/PT to assist with strengthening/mobility   - Keep Call bell within reach  - Keep bed low and locked with side rails adjusted as appropriate  - Keep care items and personal belongings within reach  - Initiate and maintain comfort rounds  - Make Fall Risk Sign visible to staff  - Offer Toileting every 2 Hours, in advance of need  - Initiate/Maintain bed alarm  - Obtain necessary fall risk management equipment: alarms   - Apply yellow socks and bracelet for high fall risk patients  - Consider moving patient to room near nurses station  Outcome: Progressing  Goal: Maintain or return to baseline ADL function  Description: INTERVENTIONS:  -  Assess patient's ability to carry out ADLs; assess patient's baseline for ADL function and identify physical deficits which impact ability to perform ADLs (bathing, care of mouth/teeth, toileting, grooming, dressing, etc )  - Assess/evaluate cause of self-care deficits   - Assess range of motion  - Assess patient's mobility; develop plan if impaired  - Assess patient's need for assistive devices and provide as appropriate  - Encourage maximum independence but intervene and supervise when necessary  - Involve family in performance of ADLs  - Assess for home care needs following discharge   - Consider OT consult to assist with ADL evaluation and planning for discharge  - Provide patient education as appropriate  Outcome: Progressing  Goal: Maintains/Returns to pre admission functional level  Description: INTERVENTIONS:  - Perform BMAT or MOVE assessment daily    - Set and communicate daily mobility goal to care team and patient/family/caregiver  - Collaborate with rehabilitation services on mobility goals if consulted  - Perform Range of Motion 4 times a day  - Reposition patient every 2 hours    - Dangle patient 3 times a day  - Stand patient 3 times a day  - Ambulate patient 3 times a day  - Out of bed to chair 3 times a day   - Out of bed for meals 3 times a day  - Out of bed for toileting  - Record patient progress and toleration of activity level   Outcome: Progressing     Problem: DISCHARGE PLANNING  Goal: Discharge to home or other facility with appropriate resources  Description: INTERVENTIONS:  - Identify barriers to discharge w/patient and caregiver  - Arrange for needed discharge resources and transportation as appropriate  - Identify discharge learning needs (meds, wound care, etc )  - Arrange for interpretive services to assist at discharge as needed  - Refer to Case Management Department for coordinating discharge planning if the patient needs post-hospital services based on physician/advanced practitioner order or complex needs related to functional status, cognitive ability, or social support system  Outcome: Progressing     Problem: METABOLIC, FLUID AND ELECTROLYTES - ADULT  Goal: Fluid balance maintained  Description: INTERVENTIONS:  - Monitor labs   - Monitor I/O and WT  - Instruct patient on fluid and nutrition as appropriate  - Assess for signs & symptoms of volume excess or deficit  Outcome: Progressing     Problem: SKIN/TISSUE INTEGRITY - ADULT  Goal: Skin Integrity remains intact(Skin Breakdown Prevention)  Description: Assess:  -Perform [place] assessment every shift   -Clean and moisturize skin every shift   -Inspect skin when repositioning, toileting, and assisting with ADLS  -Assess under medical devices such as iv every shift   -Assess extremities for adequate circulation and sensation     Bed Management:  -Have minimal linens on bed & keep smooth, unwrinkled  -Change linens as needed when moist or perspiring  -Avoid sitting or lying in one position for more than 2  hours while in bed  -Keep HOB at 30 degrees     Toileting:  -Offer bedside commode  -Assess for incontinence every shift   -Use incontinent care products after each incontinent episode such as foam cleanser     Activity:  -Mobilize patient 3 times a day  -Encourage activity and walks on unit  -Encourage or provide ROM exercises   -Turn and reposition patient every 2  Hours  -Use appropriate equipment to lift or move patient in bed  -Instruct/ Assist with weight shifting every 2 hours when out of bed in chair  -Consider limitation of chair time 2 hour intervals    Skin Care:  -Avoid use of baby powder, tape, friction and shearing, hot water or constrictive clothing  -Relieve pressure over bony prominences using pillows   -Do not massage red bony areas    Next Steps:  -Teach patient strategies to minimize risks such as wounds    -Consider consults to  interdisciplinary teams such as wound care   Outcome: Progressing

## 2023-02-24 NOTE — ASSESSMENT & PLAN NOTE
This is a 54-year-old female with history of diabetes mellitus, hypertension, hypothyroidism, neuropathy, basal cell carcinoma presented with 3-day history of nonbloody diarrhea      · Met sepsis criteria with leukocytosis and tachycardia   · Suspected due to colitis   · Markedly elevated procalcitonin, now with significant improvement, continue to monitor  · Blood cultures with no growth to date   · Continue Rocephin and Flagyl D7/7

## 2023-02-24 NOTE — DISCHARGE SUMMARY
[facility]  Discharge- Mallory Gin 1/11/1933, 80 y o  female MRN: [id number]  Unit/Bed#: Sandra 2 [facility] 87 227-01 Encounter: [id number]  Primary Care Provider: Dariana Duarte MD   Date and time admitted to hospital: 2/17/2023 11:09 AM    * Sepsis [facility])  Assessment & Plan  This is a 70-year-old female with history of diabetes mellitus, hypertension, hypothyroidism, neuropathy, basal cell carcinoma presented with 3-day history of nonbloody diarrhea      · Met sepsis criteria with leukocytosis and tachycardia   · Suspected due to colitis   · Markedly elevated procalcitonin, now with significant improvement, continue to monitor  · Blood cultures with no growth to date   · Continue Rocephin and Flagyl D7/7    Hypocalcemia  Assessment & Plan  Likely dilutional component  Monitor CMP  Nephrology following    Acute CHF (congestive heart failure) ([facility] )  Assessment & Plan  · 2D echo 2/21/2023 reveals ejection fraction 16%, diastolic function abnormal, severe aortic valve stenosis, moderate MR and moderate MS  · Patient had lower extremity edema which she was having for months  · Lower extremity duplex negative for DVT  · BNP elevated  · Chest x-ray revealed vascular congestion with small bilateral pleural effusions  · Continue torsemide 10 mg daily, carvedilol 6 25 mg daily, losartan 25 mg daily started today   · Monitor I&O, daily weight, fluid restriction  · Cardiology input will be appreciated    EILEEN ([facility] kidney [facility]) ([facility] )  Assessment & Plan  · Likely prerenal secondary to GI losses and poor p o  intake  · Nephrology input appreciated  · Patient received furosemide 20 mg IV for volume overload 2/20  · Torsemide 10 mg daily, can start losartan if creatinine stable tomorrow  · Creatinine at baseline, will monitor  · Zee catheter was placed to monitor urine output and for urinary retention      Pneumonia  Assessment & Plan  · CT abdomen pelvis showing right lower lobe infiltrate with bilateral small effusions  · No clinical picture of pneumonia    Stercoral colitis  Assessment & Plan  · Patient presented with non-bloody diarrhea associated with poor p o  intake  · CT abdomen pelvis showing stool impaction in the rectum with changes of stercoral colitis, stool throughout colon  · Completed antibiotic course  · Bowel regiment with senna, Colace and MiraLAX  · Pain control as needed, avoid opioids  · C   Difficile and stool PCR negative   · Patient having bowel movements    Hypothyroid  Assessment & Plan  · Uncontrolled, elevated TSH  · Patient's PCP recently increase levothyroxine from 75 to 88 mcg at the end of January- at that time TSH was 18, now 24 - we increased levothyroxine to 100 mcg daily  · Will need to repeat TSH in 3 to 4 weeks    Hypertension  Assessment & Plan  · Continue carvedilol at 6 25 mg twice daily  · Torsemide 10 mg daily  · Also started on losartan 25 mg daily, nifedipine discontinued    Type 2 diabetes mellitus with diabetic neuropathy, without long-term current use of insulin [facility])  Assessment & Plan  Lab Results   Component Value Date    HGBA1C 8 8 (H) 02/22/2023       Recent Labs     02/21/23  1438 02/22/23  2137   POCGLU 94 113       Blood Sugar Average: Last 72 hrs:  · (P) 02 [id number]   Continue oral hypoglycemic    [facility]    Patient Information: Palak Benitez 80 y o  female MRN: [id number]  Unit/Bed#: [address] 87 227-01 Encounter: [id number]    Discharging Physician / Practitioner: Jacqueline Fuentes MD  PCP: Isrrael Cisse MD  Admission Date: 2/17/2023  Discharge Date: 02/24/23    Disposition:      Short Term Rehab or SNF at [facility]      Reason for Admission: Sepsis    Discharge Diagnoses:     Principal Problem:    [facility]eps[facility] ([facility] )  Active Problems:    Type 2 diabetes mellitus with diabetic neuropathy, without long-term current use of insulin ([facility] )    Hypertension    Hypothyroid    Stercoral colitis Pneumonia    EILEEN (acute kidney injury) ([facility] )    Acute CHF (congestive heart failure) ([facility])    Hypocalcemia  Resolved Problems:    * No resolved hospital problems  *      Consultations During Hospital Stay:  · IP CONSULT TO NEPHROLOGY  · IP CONSULT TO CARDIOLOGY      Procedures Performed:     · none    Medication Adjustments and Discharge Medications:  · Medication Dosing Tapers - Please refer to Discharge Medication List for details on any medication dosing tapers (if applicable to patient)  · Discharge Medication List: See after visit summary for reconciled discharge medications  Wound Care Recommendations:  When applicable, please see wound care section of After Visit Summary  Diet Recommendations at Discharge:  Diet -        Diet Orders   (From admission, onward)             Start     Ordered    02/22/23 1020  Diet Cardiovascular; Sodium 2 GM; Fluid Restriction 1500 ML  Diet effective now        References:    Nutrtion Support Algorithm Enteral vs  Parenteral   Question Answer Comment   Diet Type Cardiovascular    Cardiac Sodium 2 GM    Other Restriction(s): Fluid Restriction 1500 ML    RD to adjust diet per protocol? Yes        02/22/23 1020              Fluid Restriction - No Fluid Restriction at Discharge  Significant Findings / Test Results:     CT abdomen pelvis wo contrast    Result Date: 2/17/2023  Impression: 1  Suggestion for right lower lobe infiltrate with bilateral small effusions  2  Stool impaction in the rectum with changes of stercoral colitis  Small amount of presacral fluid  Stool throughout the colon  3  Bladder wall thickening  Correlate for cystitis with urinalysis  Workstation performed: [id number]     XR chest portable    Result Date: 2/21/2023  Impression: Vascular congestion with small bilateral pleural effusions  Workstation performed: [id number]     XR chest 2 views    Result Date: 2/18/2023  · Impression: Subsegmental atelectasis vs  infiltrate right lung base    Elevated right hemidiaphragm  Small bilateral pleural effusions Workstation performed: [id number]     Hospital Course: Refugio Sales is a 80 y o  female patient who originally presented to the hospital on 2/17/2023 due to diarrhea  Patient has history of diabetes mellitus, hypertension, hypothyroidism, neuropathy, basal cell carcinoma  Was admitted and treated for sepsis secondary to colitis completed 1 week of antibiotics  Patient also noted to have acute kidney injury, nephrology closely followed  Patient also had volume overload echocardiogram revealed new onset cardiomyopathy cardiology consulted  Patient was optimized on medical management she would not want further treatments or procedures does not want a LifeVest   She states she is interested in outpatient hospice  We discharged to rehab today with outpatient follow-up  Please see above problem list for further details  Condition at Discharge: good     Discharge Day Visit / Exam:     Subjective: Patient seen and examined at bedside states she feels better overall with a better appetite  Denies any complaints    Vitals: Blood Pressure: 126/63 (02/24/23 0749)  Pulse: 79 (02/24/23 0749)  Temperature: 98 3 °F (36 8 °C) (02/24/23 0749)  Temp Source: Oral (02/24/23 0749)  Respirations: 18 (02/24/23 0749)  Height: 4' 9" (144 8 cm) (02/21/23 1123)  Weight - Scale: 63 4 kg (139 lb 12 4 oz) (02/24/23 0536)  SpO2: 94 % (02/24/23 0749)    Physical Exam:    Constitutional: Patient is oriented to person, place and time, no acute distress  HEENT:  Normocephalic, atraumatic  Cardiovascular: Normal S1S2, RRR, No murmurs/rubs/gallops appreciated  Pulmonary:  Bilateral air entry, No rhonchi/rales/wheezing appreciated  Abdominal: Soft, Bowel sounds present, Non-tender, Non-distended  Extremities:  No cyanosis, clubbing or edema     Neurological: Awake, alert    Discharge instructions/Information to patient and family:   See after visit summary section titled Discharge Instructions for information provided to patient and family  Planned Readmission: no     Discharge Statement:  I spent 35 minutes discharging the patient  This time was spent on the day of discharge  I had direct contact with the patient on the day of discharge  Greater than 50% of the total time was spent examining patient, answering all patient questions, arranging and discussing plan of care with patient as well as directly providing post-discharge instructions  Additional time then spent on discharge activities      ** Please Note: This note has been constructed using a voice recognition system **

## 2023-02-24 NOTE — NURSING NOTE
Called report to rosalia HAINES at [facility] removed  Patient left with all belongings

## 2023-02-24 NOTE — ASSESSMENT & PLAN NOTE
· 2D echo 2/21/2023 reveals ejection fraction 38%, diastolic function abnormal, severe aortic valve stenosis, moderate MR and moderate MS  · Patient had lower extremity edema which she was having for months  · Lower extremity duplex negative for DVT  · BNP elevated  · Chest x-ray revealed vascular congestion with small bilateral pleural effusions  · Continue torsemide 10 mg daily, carvedilol 6 25 mg daily, can start losartan if creatinine stable tomorrow  · Monitor I&O, daily weight, fluid restriction  · Cardiology input will be appreciated

## 2023-02-24 NOTE — PLAN OF CARE
Problem: Potential for Falls  Goal: Patient will remain free of falls  Description: INTERVENTIONS:  - Educate patient/family on patient safety including physical limitations  - Instruct patient to call for assistance with activity   - Consult OT/PT to assist with strengthening/mobility   - Keep Call bell within reach  - Keep bed low and locked with side rails adjusted as appropriate  - Keep care items and personal belongings within reach  - Initiate and maintain comfort rounds  - Make Fall Risk Sign visible to staff  - Offer Toileting every 2 Hours, in advance of need  - Initiate/Maintain bed alarm  - Obtain necessary fall risk management equipment:   - Apply yellow socks and bracelet for high fall risk patients  - Consider moving patient to room near nurses station  Outcome: Progressing     Problem: MOBILITY - ADULT  Goal: Maintain or return to baseline ADL function  Description: INTERVENTIONS:  -  Assess patient's ability to carry out ADLs; assess patient's baseline for ADL function and identify physical deficits which impact ability to perform ADLs (bathing, care of mouth/teeth, toileting, grooming, dressing, etc )  - Assess/evaluate cause of self-care deficits   - Assess range of motion  - Assess patient's mobility; develop plan if impaired  - Assess patient's need for assistive devices and provide as appropriate  - Encourage maximum independence but intervene and supervise when necessary  - Involve family in performance of ADLs  - Assess for home care needs following discharge   - Consider OT consult to assist with ADL evaluation and planning for discharge  - Provide patient education as appropriate  Outcome: Progressing  Goal: Maintains/Returns to pre admission functional level  Description: INTERVENTIONS:  - Perform BMAT or MOVE assessment daily    - Set and communicate daily mobility goal to care team and patient/family/caregiver     - Collaborate with rehabilitation services on mobility goals if consulted  - Perform Range of Motion 4 times a day  - Reposition patient every 2 hours  - Dangle patient 3 times a day  - Stand patient 3 times a day  - Ambulate patient 3 times a day  - Out of bed to chair 3 times a day   - Out of bed for meals 3 times a day  - Out of bed for toileting  - Record patient progress and toleration of activity level   Outcome: Progressing     Problem: GASTROINTESTINAL - ADULT  Goal: Maintains or returns to baseline bowel function  Description: INTERVENTIONS:  - Assess bowel function  - Encourage oral fluids to ensure adequate hydration  - Administer IV fluids if ordered to ensure adequate hydration  - Administer ordered medications as needed  - Encourage mobilization and activity  - Consider nutritional services referral to assist patient with adequate nutrition and appropriate food choices  Outcome: Progressing     Problem: Knowledge Deficit  Goal: Patient/family/caregiver demonstrates understanding of disease process, treatment plan, medications, and discharge instructions  Description: Complete learning assessment and assess knowledge base    Interventions:  - Provide teaching at level of understanding  - Provide teaching via preferred learning methods  Outcome: Progressing     Problem: Prexisting or High Potential for Compromised Skin Integrity  Goal: Skin integrity is maintained or improved  Description: INTERVENTIONS:  - Identify patients at risk for skin breakdown  - Assess and monitor skin integrity  - Assess and monitor nutrition and hydration status  - Monitor labs   - Assess for incontinence   - Turn and reposition patient  - Assist with mobility/ambulation  - Relieve pressure over bony prominences  - Avoid friction and shearing  - Provide appropriate hygiene as needed including keeping skin clean and dry  - Evaluate need for skin moisturizer/barrier cream  - Collaborate with interdisciplinary team   - Patient/family teaching  - Consider wound care consult   Outcome: Progressing     Problem: INFECTION - ADULT  Goal: Absence or prevention of progression during hospitalization  Description: INTERVENTIONS:  - Assess and monitor for signs and symptoms of infection  - Monitor lab/diagnostic results  - Monitor all insertion sites, i e  indwelling lines, tubes, and drains  - Monitor endotracheal if appropriate and nasal secretions for changes in amount and color  - [place] appropriate cooling/warming therapies per order  - Administer medications as ordered  - Instruct and encourage patient and family to use good hand hygiene technique  - Identify and instruct in appropriate isolation precautions for identified infection/condition  Outcome: Progressing     Problem: SAFETY ADULT  Goal: Patient will remain free of falls  Description: INTERVENTIONS:  - Educate patient/family on patient safety including physical limitations  - Instruct patient to call for assistance with activity   - Consult OT/PT to assist with strengthening/mobility   - Keep Call bell within reach  - Keep bed low and locked with side rails adjusted as appropriate  - Keep care items and personal belongings within reach  - Initiate and maintain comfort rounds  - Make Fall Risk Sign visible to staff  - Offer Toileting every 2 Hours, in advance of need  - Initiate/Maintain bed alarm  - Obtain necessary fall risk management equipment:    - Apply yellow socks and bracelet for high fall risk patients  - Consider moving patient to room near nurses station  Outcome: Progressing  Goal: Maintain or return to baseline ADL function  Description: INTERVENTIONS:  -  Assess patient's ability to carry out ADLs; assess patient's baseline for ADL function and identify physical deficits which impact ability to perform ADLs (bathing, care of mouth/teeth, toileting, grooming, dressing, etc )  - Assess/evaluate cause of self-care deficits   - Assess range of motion  - Assess patient's mobility; develop plan if impaired  - Assess patient's need for assistive devices and provide as appropriate  - Encourage maximum independence but intervene and supervise when necessary  - Involve family in performance of ADLs  - Assess for home care needs following discharge   - Consider OT consult to assist with ADL evaluation and planning for discharge  - Provide patient education as appropriate  Outcome: Progressing  Goal: Maintains/Returns to pre admission functional level  Description: INTERVENTIONS:  - Perform BMAT or MOVE assessment daily    - Set and communicate daily mobility goal to care team and patient/family/caregiver  - Collaborate with rehabilitation services on mobility goals if consulted  - Perform Range of Motion 4 times a day  - Reposition patient every 2 hours    - Dangle patient 3 times a day  - Stand patient 3 times a day  - Ambulate patient 3 times a day  - Out of bed to chair 3 times a day   - Out of bed for meals 3 times a day  - Out of bed for toileting  - Record patient progress and toleration of activity level   Outcome: Progressing     Problem: DISCHARGE PLANNING  Goal: Discharge to home or other facility with appropriate resources  Description: INTERVENTIONS:  - Identify barriers to discharge w/patient and caregiver  - Arrange for needed discharge resources and transportation as appropriate  - Identify discharge learning needs (meds, wound care, etc )  - Arrange for interpretive services to assist at discharge as needed  - Refer to Case Management Department for coordinating discharge planning if the patient needs post-hospital services based on physician/advanced practitioner order or complex needs related to functional status, cognitive ability, or social support system  Outcome: Progressing     Problem: METABOLIC, FLUID AND ELECTROLYTES - ADULT  Goal: Fluid balance maintained  Description: INTERVENTIONS:  - Monitor labs   - Monitor I/O and WT  - Instruct patient on fluid and nutrition as appropriate  - Assess for signs & symptoms of volume excess or deficit  Outcome: Progressing     Problem: SKIN/TISSUE INTEGRITY - ADULT  Goal: Skin Integrity remains intact(Skin Breakdown Prevention)  Description: Assess:  -Perform [place] assessment every shift  -Clean and moisturize skin every shift  -Inspect skin when repositioning, toileting, and assisting with ADLS  -Assess under medical devices such as masimo every shift  -Assess extremities for adequate circulation and sensation     Bed Management:  -Have minimal linens on bed & keep smooth, unwrinkled  -Change linens as needed when moist or perspiring  -Avoid sitting or lying in one position for more than 2 hours while in bed  -Keep HOB at 30 degrees     Toileting:  -Offer bedside commode  -Assess for incontinence every 2 hours  -Use incontinent care products after each incontinent episode such as     Activity:  -Mobilize patient 4 times a day  -Encourage activity and walks on unit  -Encourage or provide ROM exercises   -Turn and reposition patient every 2 Hours  -Use appropriate equipment to lift or move patient in bed  -Instruct/ Assist with weight shifting every 2 hours when out of bed in chair  -Consider limitation of chair time 2 hour intervals    Skin Care:  -Avoid use of baby powder, tape, friction and shearing, hot water or constrictive clothing  -Relieve pressure over bony prominences using allevyn  -Do not massage red bony areas    Next Steps:  -Teach patient strategies to minimize risks such as turning   -Consider consults to  interdisciplinary teams such as wound   Outcome: Progressing

## 2023-02-24 NOTE — PROGRESS NOTES
NEPHROLOGY PROGRESS NOTE   Rita Harper 80 y o  female MRN: [id number]  Unit/Bed#: [facility] [id number] Encounter: [id number]        ASSESSMENT & PLAN    35-year-old female with a past medical history of type 2 diabetes mellitus/hypertension, hypothyroidism presented with diarrhea, stool incontinence, sepsis with a right lower lobe pneumonia, complicated by an acute kidney injury     1  Acute kidney injury-present on admission  ? Secondary to prerenal azotemia and autoregulatory failure, urinary retention? ? Peak creatinine was 2 41  ? Creatinine now improved baseline  ? Received Lasix 20 mg IV x2's with significant volume overload yesterday requiring oxygen and this has improved  ? Creatinine relatively unchanged and back at baseline  ? Urinalysis shows trace protein and small bilirubin with a specific gravity of 1 025  ? BP improved  ? Creatinine stable with changes in medications and restarting diuretic     2  CKD G3bA2  ? Should have outpatient follow-up with nephrology  ? Baseline creatinine around 1 45  ? Hold ACE inhibitor  ? Creatinine is at baseline will monitor with above changes     3  Hypertension in the setting of Severe AS/Valvular Cardiomyopathy  ? Blood pressures are now elevated  ? In light of Echo findings  ? On carvedilol 6 25 mg BID  ? Nifedipine discontinued  ? Good urine output with torsemide 10 mg daily continue  ? Losartan 25 mg daily     4  Nonanion gap metabolic acidosis  ? Hyperchloremic  ? Stopped IV fluids  ? Bicarbonate improved monitor     5  Anemia of chronic kidney disease  ? Current hemoglobin stable   ? We will continue to monitor H&H         DISCUSSION:    Discussed with Dr Bhavya Hung and we were in agreement  [id number] [address] for [place] today   Patient will have follow up with palliative care services as outpatient  We can continue to follow and will place a follow up appointment    SUBJECTIVE:    Patient was seen today  No cp, sob, fevers, chills    12 point review of systems was otherwise negative besides what is mentioned above      Medications:    Current Facility-Administered Medications:   •  acetaminophen (TYLENOL) tablet 650 mg, 650 mg, Oral, Q6H PRN, Cece Byrd PA-C  •  carvedilol (COREG) tablet 6 25 mg, 6 25 mg, Oral, BID With Meals, MICKI Johnson, 6 25 mg at 02/24/23 6111  •  diazepam (VALIUM) tablet 2 mg, 2 mg, Oral, Q8H PRN, Cece Byrd PA-C, 2 mg at 02/23/23 4182  •  docusate sodium (COLACE) capsule 100 mg, 100 mg, Oral, BID, Cece Byrd PA-C, 100 mg at 02/24/23 0329  •  heparin (porcine) subcutaneous injection 5,000 Units, 5,000 Units, Subcutaneous, Q8H [address], 5,000 Units at 02/24/23 0520 **AND** Platelet count, , , Once, Cece Byrd PA-C  •  levothyroxine tablet 100 mcg, 100 mcg, Oral, Early Morning, Clarice Daniel MD, 100 mcg at 02/24/23 8396  •  losartan (COZAAR) tablet 25 mg, 25 mg, Oral, Daily, MICKI Johnson, 25 mg at 02/24/23 0859  •  ondansetron (ZOFRAN) injection 4 mg, 4 mg, Intravenous, Q6H PRN, Cece Byrd PA-C  •  polyethylene glycol (MIRALAX) packet 17 g, 17 g, Oral, Daily, Estefany Fox PA-C, 17 g at 02/23/23 3624  •  pravastatin (PRAVACHOL) tablet 40 mg, 40 mg, Oral, Daily With [place] James, JESUS, 40 mg at 02/23/23 1620  •  senna (SENOKOT) tablet 8 6 mg, 1 tablet, Oral, Daily, Cece Byrd PA-C, 8 6 mg at 02/24/23 3743  •  torsemide [facility]) tablet 10 mg, 10 mg, Oral, Daily, Tyler Rain DO, 10 mg at 02/24/23 0859    OBJECTIVE:    Vitals:    02/23/23 1933 02/24/23 0519 02/24/23 0536 02/24/23 0749   BP:  122/61  126/63   BP Location:    Left arm   Pulse: 78 77  79   Resp: 19   18   Temp: 97 9 °F (36 6 °C) 98 4 °F (36 9 °C)  98 3 °F (36 8 °C)   TempSrc:    Oral   SpO2: 94% 95%  94%   Weight:   63 4 kg (139 lb 12 4 oz)    Height:            Temp:  [97 9 °F (36 6 °C)-98 5 °F (36 9 °C)] 98 3 °F (36 8 °C)  HR:  [77-90] 79  Resp:  [18-19] 18  BP: (114-126)/(57-63) 126/63  SpO2:  [91 %-95 %] 94 %     Body mass index is 30 25 kg/m²  Weight (last 2 days)     Date/Time Weight    02/24/23 0536 63 4 (139 77)    02/23/23 0553 62 6 (138 01)    02/22/23 0600 64 6 (142 42)          I/O last 3 completed shifts: In: 720 [P O :720]  Out: 2100 [Urine:2100]    No intake/output data recorded  Physical exam:    General: no acute distress, cooperative  Eyes: conjunctivae pink, anicteric sclerae  ENT: lips and mucous membranes moist, no exudates, normal external ears  Neck: ROM intact, no JVD  Chest: No respiratory distress, no accessory muscle use  CVS: normal rate, non pericardial friction rub  Abdomen: soft, non-tender, non-distended, normoactive bowel sounds  Extremities: trace edema  Skin: no rash  Neuro: awake, alert, oriented, grossly intact  Psych:  Pleasant affect    Invasive Devices:   Urethral Catheter Non-latex 16 Fr   (Active)   Amt returned on insertion(mL) 700 mL 02/20/23 1030   Reasons to continue Urinary Catheter  Acute urinary retention/obstruction failing urinary retention protocol 02/24/23 0900   Goal for Removal Voiding trial when ambulation improves 02/24/23 0900   Site Assessment Clean;Skin intact 02/24/23 0900   Zee Care Done 02/24/23 0900   Collection Container Standard drainage bag 02/24/23 0900   Securement Method Securing device (Describe) 02/24/23 0900   Output (mL) 1000 mL 02/23/23 1735     Lab Results:   Results from last 7 days   Lab Units 02/24/23  0452 02/23/23  0541 02/22/23  0426 02/21/23  0506 02/20/23  0554 02/19/23  0804 02/19/23  0601 02/18/23  1726 02/18/23  0513 02/18/23  0513 02/17/23  1451 02/17/23  1444   WBC Thousand/uL  --  10 15 9 76 8 15 6 30 9 43 6 37  --    < > 14 14*  --   --    HEMOGLOBIN g/dL  --  11 7 11 3* 10 8* 9 3* 10 1* 6 9*  --    < > 9 8*  --   --    HEMATOCRIT %  --  37 6 36 5 34 4* 30 9* 33 4* 22 8*  --    < > 32 3*  --   --    PLATELETS Thousands/uL  --  272 260 219 183 175 123*  --    < > 138*  --   --    POTASSIUM mmol/L 3 6 3 9 4 1 4 4 3 6 4 3 3 7  --    < > 4 6  --   --    CHLORIDE mmol/L 104 107 108 109* 120* 106 114*  --    < > 107  --   --    CO2 mmol/L 26 23 21 19* 16* 22 19*  --    < > 22  --   --    BUN mg/dL 29* 28* 34* 41* 40* 59* 51*  --    < > 56*  --   --    CREATININE mg/dL 1 50* 1 45* 1 40* 1 47* 1 32* 2 15* 1 86*  --    < > 2 41*  --   --    CALCIUM mg/dL 7 8* 8 2* 8 1* 8 1* 5 9* 7 9* 6 3*  --    < > 8 1*  --   --    MAGNESIUM mg/dL  --   --   --   --  1 6*  --   --   --   --  1 7*  --   --    ALK PHOS U/L  --   --   --  54 42 59 46  --   --  57  --   --    ALT U/L  --   --   --  9 8 12 9  --   --  12  --   --    AST U/L  --   --   --  10* 7* 11* 9*  --   --  11*  --   --    BLOOD CULTURE   --   --   --   --   --   --   --   --   --   --  No Growth After 5 Days  No Growth After 5 Days  LEUKOCYTES UA   --   --   --   --   --   --   --  Negative  --   --   --   --    BLOOD UA   --   --   --   --   --   --   --  Negative  --   --   --   --     < > = values in this interval not displayed  Portions of the record may have been created with voice recognition software  Occasional wrong word or "sound a like" substitutions may have occurred due to the inherent limitations of voice recognition software  Read the chart carefully and recognize, using context, where substitutions have occurred  If you have any questions, please contact the dictating provider

## 2023-02-27 ENCOUNTER — TRANSITIONAL CARE MANAGEMENT (OUTPATIENT)
Dept: FAMILY MEDICINE CLINIC | Facility: CLINIC | Age: 88
End: 2023-02-27

## 2023-02-27 ENCOUNTER — TELEPHONE (OUTPATIENT)
Dept: FAMILY MEDICINE CLINIC | Facility: CLINIC | Age: 88
End: 2023-02-27

## 2023-02-28 ENCOUNTER — TELEPHONE (OUTPATIENT)
Dept: FAMILY MEDICINE CLINIC | Facility: CLINIC | Age: 88
End: 2023-02-28

## 2023-02-28 ENCOUNTER — TELEPHONE (OUTPATIENT)
Dept: CARDIOLOGY CLINIC | Facility: CLINIC | Age: 88
End: 2023-02-28

## 2023-02-28 NOTE — TELEPHONE ENCOUNTER
----- Message from Denis Knox MD sent at 2/24/2023  3:45 PM EST -----  Hi this patient was discharged from [facility] today to rehab  Can you please call to arrange a follow-up appointment in the office?  Thank you

## 2023-03-17 ENCOUNTER — PATIENT OUTREACH (OUTPATIENT)
Dept: CASE MANAGEMENT | Facility: OTHER | Age: 88
End: 2023-03-17

## 2023-03-17 NOTE — PROGRESS NOTES
Email sent to facility to confirm the patient is receiving skilled services and if there is a LCD for the patient  This care manager assistant will continue to monitor via chart review

## 2023-03-24 ENCOUNTER — PATIENT OUTREACH (OUTPATIENT)
Dept: CASE MANAGEMENT | Facility: OTHER | Age: 88
End: 2023-03-24

## 2023-03-27 NOTE — PROGRESS NOTES
Update received the patient discharged 3/20/23 to Home  A email was sent to the facility requesting discharge instructions  When CM assistant has received the Discharge paperwork CM assistant will attach to this episode  I have removed myself off of the care team, added the CM to the care team who will follow the patient through the episode, sent the care manager an inbasket notifying them of the [facility]ILE[facility]/SNF Pathway  Ambulatory referral placed for complex care management

## 2023-03-29 NOTE — ASSESSMENT & PLAN NOTE
· Likely prerenal secondary to GI losses and poor p o  intake  · Improved  · Continue to lisinopril and Lasix  · Appreciate Nephrology input  · Continue IVF  · Monitor urine output, PVR warm and dry

## 2024-04-19 NOTE — PROGRESS NOTES
NEPHROLOGY PROGRESS NOTE   Shorty Lam 80 y o  female MRN: [id number]  Unit/Bed#: [place] [address] [address] [id number] Encounter: [id number]        ASSESSMENT & PLAN    24-year-old female with a past medical history of type 2 diabetes mellitus/hypertension, hypothyroidism presented with diarrhea, stool incontinence, sepsis with a right lower lobe pneumonia, complicated by an acute kidney injury     1  Acute kidney injury-present on admission  ? Secondary to prerenal azotemia and autoregulatory failure, urinary retention? ? Peak creatinine was 2 41  ? Creatinine now improved baseline  ? Received Lasix 20 mg IV x2's with significant volume overload yesterday requiring oxygen and this has improved  ? Creatinine relatively unchanged and back at baseline  ? Urinalysis shows trace protein and small bilirubin with a specific gravity of 1 025  ? Continue to monitor     2  CKD G3bA2  ? Should have outpatient follow-up with nephrology  ? Baseline creatinine around 1 45  ? Hold ACE inhibitor  ? Creatinine is at baseline     3  Hypertension  ? Blood pressures are currently acceptable  ? Procardia on hold, lisinopril on hold, oral furosemide on hold  ? Ongoing treatment of hypothyroidism with a recent increase in levothyroxine  ? Now auto diuresing, monitor blood pressure     4  Nonanion gap metabolic acidosis  ? Hyperchloremic  ? Stop IV fluids  ? Start oral sodium bicarbonate tablets     5  Anemia of chronic kidney disease  ? Current hemoglobin stable   ? We will continue to monitor     DISCUSSION:    Off oxygen  Zee catheter in place  Monitor serum creatinine    SUBJECTIVE:    Patient was seen today  Overall feeling better  No chest pain no nausea no vomiting  Breathing has improved    12 point review of systems was otherwise negative besides what is mentioned above      Medications:    Current Facility-Administered Medications:   •  acetaminophen (TYLENOL) tablet 650 mg, 650 mg, Oral, Q6H PRN, Dago Pettit PA-C  • cefTRIAXone (ROCEPHIN) IVPB (premix in dextrose) 1,000 mg 50 mL, 1,000 mg, Intravenous, Q24H, Darío Gilmore PA-C, Stopped at 02/20/23 1711  •  diazepam (VALIUM) tablet 2 mg, 2 mg, Oral, Q8H PRN, Darío Gilmore PA-C  •  docusate sodium (COLACE) capsule 100 mg, 100 mg, Oral, BID, Darío Gilmore PA-C, 100 mg at 02/21/23 6150  •  heparin (porcine) subcutaneous injection 5,000 Units, 5,000 Units, Subcutaneous, Q8H [address], 5,000 Units at 02/21/23 0505 **AND** Platelet count, , , Once, Darío Gilmore PA-C  •  levothyroxine tablet 100 mcg, 100 mcg, Oral, Early Morning, Clarice Daniel MD, 100 mcg at 02/21/23 0505  •  metroNIDAZOLE (FLAGYL) IVPB (premix) 500 mg 100 mL, 500 mg, Intravenous, Q8H, Darío Gilmore PA-C, Last Rate: 200 mL/hr at 02/21/23 0909, 500 mg at 02/21/23 4416  •  ondansetron [facility]) injection 4 mg, 4 mg, Intravenous, Q6H PRN, Darío Gilmore PA-C  •  polyethylene glycol (MIRALAX) packet 17 g, 17 g, Oral, Daily, Estefany Knight PA-C, 17 g at 02/21/23 0801  •  pravastatin (PRAVACHOL) tablet 40 mg, 40 mg, Oral, Daily With Sung Melidaestlamar Knight PA-C, 40 mg at 02/20/23 1642  •  senna (SENOKOT) tablet 8 6 mg, 1 tablet, Oral, Daily, Darío Gilmore PA-C, 8 6 mg at 02/21/23 0801    OBJECTIVE:    Vitals:    02/20/23 2124 02/21/23 0138 02/21/23 0600 02/21/23 0818   BP: 162/90 161/90  157/91   BP Location: Left arm   Right arm   Pulse: 95 97  96   Resp: 18 16  18   Temp: 98 °F (36 7 °C) 98 2 °F (36 8 °C)  98 5 °F (36 9 °C)   TempSrc: Oral   Oral   SpO2: 95% 95% 94% (!) 88%   Weight:   65 kg (143 lb 4 8 oz)    Height:            Temp:  [97 6 °F (36 4 °C)-98 5 °F (36 9 °C)] 98 5 °F (36 9 °C)  HR:  [] 96  Resp:  [16-20] 18  BP: (157-171)/(90-93) 157/91  SpO2:  [87 %-100 %] 88 %     Body mass index is 31 01 kg/m²      Weight (last 2 days)     Date/Time Weight    02/21/23 0600 65 (143 3)    02/20/23 0557 65 (143 3)    02/19/23 0600 64 9 (143 08)          I/O last 3 completed shifts: In: 2421 7 [P O :1080; I V :1191 7; IV Piggyback:150]  Out: 5150 [Urine:5150]    No intake/output data recorded  Physical exam:    General: no acute distress, cooperative  Eyes: conjunctivae pink, anicteric sclerae  ENT: lips and mucous membranes moist, no exudates, normal external ears  Neck: ROM intact, no JVD  Chest: No respiratory distress, no accessory muscle use  CVS: normal rate, non pericardial friction rub  Abdomen: soft, non-tender, non-distended, normoactive bowel sounds  Extremities: no edema of both legs  Skin: no rash  Neuro: awake, alert, oriented, grossly intact  Psych:  Pleasant affect    Invasive Devices:   Urethral Catheter Non-latex 16 Fr   (Active)   Amt returned on insertion(mL) 700 mL 02/20/23 1030   Reasons to continue Urinary Catheter  Acute urinary retention/obstruction failing urinary retention protocol 02/21/23 0732   Goal for Removal Voiding trial when ambulation improves 02/21/23 0732   Site Assessment Clean;Skin intact 02/21/23 0732   Zee Care Done 02/20/23 2100   Collection Container Standard drainage bag 02/21/23 0732   Securement Method Securing device (Describe) 02/21/23 0732   Output (mL) 450 mL 02/21/23 0101     Lab Results:   Results from last 7 days   Lab Units 02/21/23  0506 02/20/23  0554 02/19/23  0804 02/19/23  0601 02/18/23  1726 02/18/23  0513 02/17/23  1451 02/17/23  1444   WBC Thousand/uL 8 15 6 30 9 43 6 37  --  14 14*  --   --    HEMOGLOBIN g/dL 10 8* 9 3* 10 1* 6 9*  --  9 8*  --   --    HEMATOCRIT % 34 4* 30 9* 33 4* 22 8*  --  32 3*  --   --    PLATELETS Thousands/uL 219 183 175 123*  --  138*  --   --    POTASSIUM mmol/L 4 4 3 6 4 3 3 7  --  4 6  --   --    CHLORIDE mmol/L 109* 120* 106 114*  --  107  --   --    CO2 mmol/L 19* 16* 22 19*  --  22  --   --    BUN mg/dL 41* 40* 59* 51*  --  56*  --   --    CREATININE mg/dL 1 47* 1 32* 2 15* 1 86*  --  2 41*  --   --    CALCIUM mg/dL 8 1* 5 9* 7 9* 6 3*  -- 8 1*  --   --    MAGNESIUM mg/dL  --  1 6*  --   --   --  1 7*  --   --    ALK PHOS U/L 54 42 59 46  --  57  --   --    ALT U/L 9 8 12 9  --  12  --   --    AST U/L 10* 7* 11* 9*  --  11*  --   --    BLOOD CULTURE   --   --   --   --   --   --  No Growth at 72 hrs  No Growth at 72 hrs  LEUKOCYTES UA   --   --   --   --  Negative  --   --   --    BLOOD UA   --   --   --   --  Negative  --   --   --          Portions of the record may have been created with voice recognition software  Occasional wrong word or "sound a like" substitutions may have occurred due to the inherent limitations of voice recognition software  Read the chart carefully and recognize, using context, where substitutions have occurred  If you have any questions, please contact the dictating provider  - - -

## 2025-04-02 NOTE — ASSESSMENT & PLAN NOTE
· Continue carvedilol at 6 25 mg twice daily, nifedipine XL 30 mg twice daily which can be downgraded decreased to daily then off tomorrow  · Torsemide 10 mg daily  · Can start losartan 25 mg daily tomorrow if creatinine stable Take over the counter pain medication/Prescriptions electronically submitted to pharmacy from doctor's office